# Patient Record
Sex: FEMALE | Race: WHITE | NOT HISPANIC OR LATINO | Employment: OTHER | URBAN - NONMETROPOLITAN AREA
[De-identification: names, ages, dates, MRNs, and addresses within clinical notes are randomized per-mention and may not be internally consistent; named-entity substitution may affect disease eponyms.]

---

## 2017-12-19 ENCOUNTER — OFFICE VISIT (OUTPATIENT)
Dept: ENDOCRINOLOGY | Facility: CLINIC | Age: 62
End: 2017-12-19

## 2017-12-19 ENCOUNTER — APPOINTMENT (OUTPATIENT)
Dept: LAB | Facility: HOSPITAL | Age: 62
End: 2017-12-19

## 2017-12-19 VITALS
SYSTOLIC BLOOD PRESSURE: 128 MMHG | HEIGHT: 69 IN | HEART RATE: 63 BPM | BODY MASS INDEX: 31.83 KG/M2 | WEIGHT: 214.9 LBS | DIASTOLIC BLOOD PRESSURE: 80 MMHG

## 2017-12-19 DIAGNOSIS — M80.00XS AGE-RELATED OSTEOPOROSIS WITH CURRENT PATHOLOGICAL FRACTURE, SEQUELA: ICD-10-CM

## 2017-12-19 DIAGNOSIS — E04.1 LEFT THYROID NODULE: ICD-10-CM

## 2017-12-19 DIAGNOSIS — E11.9 CONTROLLED TYPE 2 DIABETES MELLITUS WITHOUT COMPLICATION, WITHOUT LONG-TERM CURRENT USE OF INSULIN (HCC): ICD-10-CM

## 2017-12-19 DIAGNOSIS — E21.0 PRIMARY HYPERPARATHYROIDISM (HCC): Primary | ICD-10-CM

## 2017-12-19 PROBLEM — Q78.2 SEVERE OSTEOPETROSIS: Status: ACTIVE | Noted: 2017-12-19

## 2017-12-19 LAB
25(OH)D3 SERPL-MCNC: 49 NG/ML (ref 30–100)
ALBUMIN SERPL-MCNC: 4.2 G/DL (ref 3.4–4.8)
ALBUMIN/GLOB SERPL: 1.2 G/DL (ref 1.1–1.8)
ALP SERPL-CCNC: 65 U/L (ref 38–126)
ALT SERPL W P-5'-P-CCNC: 49 U/L (ref 9–52)
ANION GAP SERPL CALCULATED.3IONS-SCNC: 14 MMOL/L (ref 5–15)
ARTICHOKE IGE QN: 91 MG/DL (ref 1–129)
AST SERPL-CCNC: 36 U/L (ref 14–36)
BASOPHILS # BLD AUTO: 0.02 10*3/MM3 (ref 0–0.2)
BASOPHILS NFR BLD AUTO: 0.3 % (ref 0–2)
BILIRUB SERPL-MCNC: 0.4 MG/DL (ref 0.2–1.3)
BUN BLD-MCNC: 14 MG/DL (ref 7–21)
BUN/CREAT SERPL: 19.2 (ref 7–25)
CALCIUM SPEC-SCNC: 11.1 MG/DL (ref 8.4–10.2)
CHLORIDE SERPL-SCNC: 104 MMOL/L (ref 95–110)
CHOLEST SERPL-MCNC: 158 MG/DL (ref 0–199)
CO2 SERPL-SCNC: 22 MMOL/L (ref 22–31)
CREAT BLD-MCNC: 0.73 MG/DL (ref 0.5–1)
DEPRECATED RDW RBC AUTO: 43.4 FL (ref 36.4–46.3)
EOSINOPHIL # BLD AUTO: 0.26 10*3/MM3 (ref 0–0.7)
EOSINOPHIL NFR BLD AUTO: 4.5 % (ref 0–7)
ERYTHROCYTE [DISTWIDTH] IN BLOOD BY AUTOMATED COUNT: 12.5 % (ref 11.5–14.5)
GFR SERPL CREATININE-BSD FRML MDRD: 80 ML/MIN/1.73 (ref 45–104)
GLOBULIN UR ELPH-MCNC: 3.6 GM/DL (ref 2.3–3.5)
GLUCOSE BLD-MCNC: 154 MG/DL (ref 60–100)
HBA1C MFR BLD: 6.9 % (ref 4–5.6)
HCT VFR BLD AUTO: 37.7 % (ref 35–45)
HDLC SERPL-MCNC: 36 MG/DL (ref 60–200)
HGB BLD-MCNC: 12.4 G/DL (ref 12–15.5)
IMM GRANULOCYTES # BLD: 0.01 10*3/MM3 (ref 0–0.02)
IMM GRANULOCYTES NFR BLD: 0.2 % (ref 0–0.5)
LDLC/HDLC SERPL: 1.8 {RATIO} (ref 0–3.22)
LYMPHOCYTES # BLD AUTO: 1.65 10*3/MM3 (ref 0.6–4.2)
LYMPHOCYTES NFR BLD AUTO: 28.4 % (ref 10–50)
MCH RBC QN AUTO: 31.2 PG (ref 26.5–34)
MCHC RBC AUTO-ENTMCNC: 32.9 G/DL (ref 31.4–36)
MCV RBC AUTO: 95 FL (ref 80–98)
MONOCYTES # BLD AUTO: 0.31 10*3/MM3 (ref 0–0.9)
MONOCYTES NFR BLD AUTO: 5.3 % (ref 0–12)
NEUTROPHILS # BLD AUTO: 3.56 10*3/MM3 (ref 2–8.6)
NEUTROPHILS NFR BLD AUTO: 61.3 % (ref 37–80)
PHOSPHATE SERPL-MCNC: 3.3 MG/DL (ref 2.4–4.4)
PLATELET # BLD AUTO: 221 10*3/MM3 (ref 150–450)
PMV BLD AUTO: 11.6 FL (ref 8–12)
POTASSIUM BLD-SCNC: 4.1 MMOL/L (ref 3.5–5.1)
PROT SERPL-MCNC: 7.8 G/DL (ref 6.3–8.6)
RBC # BLD AUTO: 3.97 10*6/MM3 (ref 3.77–5.16)
SODIUM BLD-SCNC: 140 MMOL/L (ref 137–145)
TRIGL SERPL-MCNC: 286 MG/DL (ref 20–199)
TSH SERPL DL<=0.05 MIU/L-ACNC: 1.22 MIU/ML (ref 0.46–4.68)
WBC NRBC COR # BLD: 5.81 10*3/MM3 (ref 3.2–9.8)

## 2017-12-19 PROCEDURE — 99205 OFFICE O/P NEW HI 60 MIN: CPT | Performed by: INTERNAL MEDICINE

## 2017-12-19 PROCEDURE — 36415 COLL VENOUS BLD VENIPUNCTURE: CPT | Performed by: INTERNAL MEDICINE

## 2017-12-19 PROCEDURE — 84443 ASSAY THYROID STIM HORMONE: CPT | Performed by: INTERNAL MEDICINE

## 2017-12-19 PROCEDURE — 83970 ASSAY OF PARATHORMONE: CPT | Performed by: INTERNAL MEDICINE

## 2017-12-19 PROCEDURE — 82306 VITAMIN D 25 HYDROXY: CPT | Performed by: INTERNAL MEDICINE

## 2017-12-19 PROCEDURE — 84100 ASSAY OF PHOSPHORUS: CPT | Performed by: INTERNAL MEDICINE

## 2017-12-19 PROCEDURE — 80061 LIPID PANEL: CPT | Performed by: INTERNAL MEDICINE

## 2017-12-19 PROCEDURE — 85025 COMPLETE CBC W/AUTO DIFF WBC: CPT | Performed by: INTERNAL MEDICINE

## 2017-12-19 PROCEDURE — 80053 COMPREHEN METABOLIC PANEL: CPT | Performed by: INTERNAL MEDICINE

## 2017-12-19 PROCEDURE — 83036 HEMOGLOBIN GLYCOSYLATED A1C: CPT | Performed by: INTERNAL MEDICINE

## 2017-12-19 PROCEDURE — 82652 VIT D 1 25-DIHYDROXY: CPT | Performed by: INTERNAL MEDICINE

## 2017-12-19 RX ORDER — MELATONIN
1000 DAILY
COMMUNITY
End: 2018-03-30

## 2017-12-19 RX ORDER — OLOPATADINE HYDROCHLORIDE 2 MG/ML
SOLUTION/ DROPS OPHTHALMIC DAILY PRN
COMMUNITY

## 2017-12-19 RX ORDER — DICYCLOMINE HYDROCHLORIDE 10 MG/1
10 CAPSULE ORAL 3 TIMES DAILY PRN
COMMUNITY

## 2017-12-19 RX ORDER — CARVEDILOL 3.12 MG/1
3.12 TABLET ORAL 2 TIMES DAILY WITH MEALS
COMMUNITY

## 2017-12-19 RX ORDER — CETIRIZINE HYDROCHLORIDE 10 MG/1
10 TABLET ORAL NIGHTLY
COMMUNITY
End: 2020-12-11

## 2017-12-19 RX ORDER — IBANDRONATE SODIUM 150 MG/1
150 TABLET, FILM COATED ORAL
COMMUNITY
End: 2018-03-30

## 2017-12-19 RX ORDER — DOXYCYCLINE HYCLATE 50 MG/1
50 CAPSULE ORAL 2 TIMES DAILY
COMMUNITY
End: 2018-03-30

## 2017-12-19 RX ORDER — LOSARTAN POTASSIUM 50 MG/1
50 TABLET ORAL DAILY
COMMUNITY

## 2017-12-19 RX ORDER — OMEPRAZOLE 20 MG/1
20 CAPSULE, DELAYED RELEASE ORAL NIGHTLY
COMMUNITY

## 2017-12-19 RX ORDER — MONTELUKAST SODIUM 10 MG/1
10 TABLET ORAL NIGHTLY
COMMUNITY

## 2017-12-19 RX ORDER — BUDESONIDE AND FORMOTEROL FUMARATE DIHYDRATE 160; 4.5 UG/1; UG/1
2 AEROSOL RESPIRATORY (INHALATION)
COMMUNITY

## 2017-12-19 RX ORDER — METRONIDAZOLE 7.5 MG/G
GEL VAGINAL 2 TIMES DAILY
COMMUNITY
End: 2018-03-30

## 2017-12-19 RX ORDER — SODIUM PHOSPHATE,MONO-DIBASIC 19G-7G/118
ENEMA (ML) RECTAL 2 TIMES DAILY
COMMUNITY

## 2017-12-19 NOTE — PROGRESS NOTES
Rekha Dorado is a 62 y.o. female who presents for  evaluation of   Chief Complaint   Patient presents with   • Abnormal Lab       Referring provider    Mariam Morgan MD  [address] [place] [address]    Primary Care Provider    Mariam Morgan MD    Very pleasant 62-year-old female is referred for hypercalcemia.    Duration, she is aware of hypercalcemia since 2014    Timing, this has been constant.    Severity moderate to severe given associated complications.    Complications, she has severe osteoporosis complicated by  wrist fracture.    She also has history of nephrolithiasis.    Quantity, calcium has been as high as 11.9 in February 2017.    Lab Results   Component Value Date    PTH 47.3 12/19/2017    CALCIUM 11.1 (H) 12/19/2017    CALCIUM 11.5 (H) 12/19/2017    PHOS 3.3 12/19/2017         Alleviating factors she is taking Boniva for osteoporosis as well as vitamin D 2000 units daily.    ----------    She also has type 2 diabetes presently on metformin monotherapy with elevated fasting glucose    Past Medical History:   Diagnosis Date   • Controlled type 2 diabetes mellitus without complication, without long-term current use of insulin 12/19/2017   • Left thyroid nodule 12/19/2017   • Primary hyperparathyroidism 12/19/2017   • Severe osteopetrosis 12/19/2017     Family History   Problem Relation Age of Onset   • Diabetes Mother      Social History   Substance Use Topics   • Smoking status: Never Smoker   • Smokeless tobacco: Never Used   • Alcohol use None         Current Outpatient Prescriptions:   •  b complex-C-folic acid 1 MG capsule, Take 1 capsule by mouth Daily., Disp: , Rfl:   •  budesonide-formoterol (SYMBICORT) 160-4.5 MCG/ACT inhaler, Inhale 2 puffs 2 (Two) Times a Day., Disp: , Rfl:   •  carvedilol (COREG) 3.125 MG tablet, Take 3.125 mg by mouth 2 (Two) Times a Day With Meals., Disp: , Rfl:   •  cetirizine (zyrTEC) 10 MG tablet, Take 10 mg by mouth Daily., Disp: , Rfl:   •   cholecalciferol (VITAMIN D3) 1000 units tablet, Take 1,000 Units by mouth Daily., Disp: , Rfl:   •  choline fenofibrate (TRILIPIX) 135 MG capsule, Take 135 mg by mouth Daily., Disp: , Rfl:   •  dicyclomine (BENTYL) 10 MG capsule, Take 10 mg by mouth., Disp: , Rfl:   •  doxycycline (VIBRAMYCIN) 50 MG capsule, Take 50 mg by mouth 2 (Two) Times a Day., Disp: , Rfl:   •  glucosamine sulfate 500 MG capsule capsule, Take  by mouth 3 (Three) Times a Day With Meals., Disp: , Rfl:   •  ibandronate (BONIVA) 150 MG tablet, Take 150 mg by mouth Every 30 (Thirty) Days., Disp: , Rfl:   •  losartan (COZAAR) 100 MG tablet, Take 100 mg by mouth Daily., Disp: , Rfl:   •  metroNIDAZOLE (METROGEL) 0.75 % vaginal gel, Insert  into the vagina 2 (Two) Times a Day., Disp: , Rfl:   •  montelukast (SINGULAIR) 10 MG tablet, Take 10 mg by mouth Every Night., Disp: , Rfl:   •  olopatadine (PATADAY) 0.2 % solution ophthalmic solution, Daily., Disp: , Rfl:   •  omeprazole (priLOSEC) 20 MG capsule, Take 20 mg by mouth Daily., Disp: , Rfl:   •  SITagliptin-MetFORMIN HCl -1000 MG tablet sustained-release 24 hour, One tab po daily with bkfast, Disp: 30 tablet, Rfl: 11    Review of Systems    Review of Systems   Constitutional: Negative for activity change, appetite change, chills, diaphoresis, fatigue, fever and unexpected weight change.   HENT: Negative for congestion, dental problem, drooling, ear discharge, ear pain, facial swelling, mouth sores, postnasal drip, rhinorrhea, sinus pressure, sore throat, tinnitus, trouble swallowing and voice change.    Eyes: Negative for photophobia, pain, discharge, redness, itching and visual disturbance.   Respiratory: Negative for apnea, cough, choking, chest tightness, shortness of breath, wheezing and stridor.    Cardiovascular: Negative for chest pain, palpitations and leg swelling.   Gastrointestinal: Negative for abdominal distention, abdominal pain, constipation, diarrhea, nausea and vomiting.  "  Endocrine: Negative for cold intolerance, heat intolerance, polydipsia, polyphagia and polyuria.   Genitourinary: Negative for decreased urine volume, difficulty urinating, dysuria, flank pain, frequency, hematuria and urgency.   Musculoskeletal: Negative for arthralgias, back pain, gait problem, joint swelling, myalgias, neck pain and neck stiffness.   Skin: Negative for color change, pallor, rash and wound.   Allergic/Immunologic: Negative for immunocompromised state.   Neurological: Negative for dizziness, tremors, seizures, syncope, facial asymmetry, speech difficulty, weakness, light-headedness, numbness and headaches.   Hematological: Negative for adenopathy.   Psychiatric/Behavioral: Negative for agitation, behavioral problems, confusion, decreased concentration, dysphoric mood, hallucinations, self-injury, sleep disturbance and suicidal ideas. The patient is not nervous/anxious and is not hyperactive.         Objective:   /80 (BP Location: Left arm, Patient Position: Sitting, Cuff Size: Large Adult)  Pulse 63  Ht 175.3 cm (69\")  Wt 97.5 kg (214 lb 14.4 oz)  BMI 31.74 kg/m2    Physical Exam   Constitutional: She is oriented to person, place, and time. She appears well-developed.   HENT:   Head: Normocephalic.   Right Ear: External ear normal.   Left Ear: External ear normal.   Nose: Nose normal.   Eyes: Conjunctivae and EOM are normal. No scleral icterus.   Neck: Normal range of motion. Neck supple. No tracheal deviation present. Thyromegaly present.   Palpable left thyroid nodule   Cardiovascular: Normal rate, regular rhythm, normal heart sounds and intact distal pulses.  Exam reveals no gallop and no friction rub.    No murmur heard.  Pulmonary/Chest: Effort normal and breath sounds normal. No stridor. No respiratory distress. She has no wheezes. She has no rales. She exhibits no tenderness.   Abdominal: Soft. Bowel sounds are normal. She exhibits no distension and no mass. There is no " tenderness. There is no rebound and no guarding.   Musculoskeletal: Normal range of motion. She exhibits no tenderness or deformity.   Lymphadenopathy:     She has no cervical adenopathy.   Neurological: She is alert and oriented to person, place, and time. She displays normal reflexes. She exhibits normal muscle tone. Coordination normal.   Skin: No rash noted. No erythema. No pallor.   Psychiatric: She has a normal mood and affect. Her behavior is normal. Judgment and thought content normal.       Lab Review    No results found for this or any previous visit.        Assessment/Plan       ICD-10-CM ICD-9-CM   1. Primary hyperparathyroidism E21.0 252.01   2. Controlled type 2 diabetes mellitus without complication, without long-term current use of insulin E11.9 250.00   3. Left thyroid nodule E04.1 241.0   4. Age-related osteoporosis with current pathological fracture, sequela M80.00XS 905.5     733.01     In regards to diabetes she is presently taking metformin 500 mg with breakfast and 1000 mg with supper.    Change to Janumet 100/1000 mg extended release one tablet with breakfast.    ===========    Primary hyperparathyroidism.    Diagnosis has been proven, PTH is high normal, calcium is 11.2, urine calcium exceeds 500 mg.    Parathyroid scan and neck ultrasound did not reveal parathyroid adenoma.    I will still referred her for surgery given that she has many indications: Nephrolithiasis, osteoporosis, calcium level that exceeds 11, urine calcium that exceeds 300.      Continue taking 2000 units of vitamin D and despite having high calcium she should be taking 400-600 mg daily to prevent worsening of osteoporosis and in the event that parathyroidectomy is done to prevent hungry bone syndrome.    Stop Boniva and start Prolia          I reviewed and summarized records from Mariam Morgan MD from 2017 and I reviewed / ordered labs.     Orders Placed This Encounter   Procedures   • NM Parathyroid Scan w SPECT      Order Specific Question:   Reason for Exam:     Answer:   primary hyperparathyroidism   • US Thyroid     Order Specific Question:   Reason for Exam:     Answer:   left thryoid nodule   • DEXA Bone Density Appendicular     Order Specific Question:   Reason for Exam:     Answer:   primary hyperparathyroidism   • DEXA Bone Density Axial     Order Specific Question:   Reason for Exam:     Answer:   osteoporosis   • CBC Auto Differential   • Comprehensive Metabolic Panel   • Hemoglobin A1c   • TSH   • Lipid Panel   • Calcium, Urine, 24 Hour - Urine, Clean Catch   • Creatinine, Urine, 24 Hour - Urine, Clean Catch   • Vitamin D 25 Hydroxy   • Vitamin D 1,25 Dihydroxy   • Phosphorus   • PTH, Intact & Calcium         A copy of my note was sent to Mariam Morgan MD    Please see my above opinion and suggestions.

## 2017-12-20 LAB
CALCIUM SPEC-SCNC: 11.5 MG/DL (ref 8.4–10.2)
PTH-INTACT SERPL-MCNC: 47.3 PG/ML (ref 10–65)

## 2017-12-21 LAB — 1,25(OH)2D3 SERPL-MCNC: 77.5 PG/ML (ref 19.9–79.3)

## 2017-12-21 PROCEDURE — 82340 ASSAY OF CALCIUM IN URINE: CPT | Performed by: INTERNAL MEDICINE

## 2017-12-21 PROCEDURE — 82570 ASSAY OF URINE CREATININE: CPT | Performed by: INTERNAL MEDICINE

## 2017-12-21 PROCEDURE — 81050 URINALYSIS VOLUME MEASURE: CPT | Performed by: INTERNAL MEDICINE

## 2017-12-22 LAB
CALCIUM 24H UR-MCNC: 21.4 MG/DL
CALCIUM 24H UR-MRATE: 535 MG/24 HR (ref 100–300)
COLLECT DURATION TIME UR: 24 HRS
COLLECT DURATION TIME UR: 24 HRS
CREAT UR-MCNC: 54.9 MG/DL
CREATINE 24H UR-MRATE: 1.37 G/24 HR (ref 0.8–2.8)
SPECIMEN VOL 24H UR: 2500 ML
SPECIMEN VOL 24H UR: 2500 ML

## 2018-01-02 ENCOUNTER — HOSPITAL ENCOUNTER (OUTPATIENT)
Dept: ULTRASOUND IMAGING | Facility: HOSPITAL | Age: 63
Discharge: HOME | End: 2018-01-02
Admitting: INTERNAL MEDICINE

## 2018-01-02 ENCOUNTER — HOSPITAL ENCOUNTER (OUTPATIENT)
Dept: NUCLEAR MEDICINE | Facility: HOSPITAL | Age: 63
Discharge: HOME | End: 2018-01-02

## 2018-01-02 PROCEDURE — 76536 US EXAM OF HEAD AND NECK: CPT

## 2018-01-02 PROCEDURE — 78071 PARATHYRD PLANAR W/WO SUBTRJ: CPT

## 2018-01-02 PROCEDURE — A9500 TC99M SESTAMIBI: HCPCS | Performed by: INTERNAL MEDICINE

## 2018-01-02 PROCEDURE — 0 TECHNETIUM SESTAMIBI: Performed by: INTERNAL MEDICINE

## 2018-01-02 RX ADMIN — TECHNETIUM TC-99M SESTAMIBI 1 DOSE: 1 INJECTION INTRAVENOUS at 13:11

## 2018-01-08 DIAGNOSIS — E21.0 PRIMARY HYPERPARATHYROIDISM (HCC): Primary | ICD-10-CM

## 2018-01-23 LAB
ALBUMIN SERPL-MCNC: 4.2 G/DL (ref 3.4–4.8)
ANION GAP SERPL CALCULATED.3IONS-SCNC: 11 MMOL/L (ref 5–15)
BUN BLD-MCNC: 18 MG/DL (ref 7–21)
BUN/CREAT SERPL: 21.2 (ref 7–25)
CALCIUM SPEC-SCNC: 12.8 MG/DL (ref 8.4–10.2)
CHLORIDE SERPL-SCNC: 101 MMOL/L (ref 95–110)
CO2 SERPL-SCNC: 27 MMOL/L (ref 22–31)
CREAT BLD-MCNC: 0.85 MG/DL (ref 0.5–1)
GFR SERPL CREATININE-BSD FRML MDRD: 67 ML/MIN/1.73 (ref 45–104)
GLUCOSE BLD-MCNC: 98 MG/DL (ref 60–100)
PHOSPHATE SERPL-MCNC: 2.5 MG/DL (ref 2.4–4.4)
POTASSIUM BLD-SCNC: 3.6 MMOL/L (ref 3.5–5.1)
SODIUM BLD-SCNC: 139 MMOL/L (ref 137–145)

## 2018-01-23 PROCEDURE — 36415 COLL VENOUS BLD VENIPUNCTURE: CPT | Performed by: FAMILY MEDICINE

## 2018-01-23 PROCEDURE — 82652 VIT D 1 25-DIHYDROXY: CPT | Performed by: INTERNAL MEDICINE

## 2018-01-23 PROCEDURE — 80069 RENAL FUNCTION PANEL: CPT | Performed by: INTERNAL MEDICINE

## 2018-01-23 PROCEDURE — 82310 ASSAY OF CALCIUM: CPT | Performed by: INTERNAL MEDICINE

## 2018-01-23 PROCEDURE — 83970 ASSAY OF PARATHORMONE: CPT | Performed by: INTERNAL MEDICINE

## 2018-01-24 LAB
CALCIUM SPEC-SCNC: 12.9 MG/DL (ref 8.4–10.2)
PTH-INTACT SERPL-MCNC: 74.8 PG/ML (ref 10–65)

## 2018-01-25 LAB — 1,25(OH)2D3 SERPL-MCNC: 86.5 PG/ML (ref 19.9–79.3)

## 2018-01-28 DIAGNOSIS — E21.0 PRIMARY HYPERPARATHYROIDISM (HCC): Primary | ICD-10-CM

## 2018-02-14 ENCOUNTER — CONSULT (OUTPATIENT)
Dept: SURGERY | Facility: CLINIC | Age: 63
End: 2018-02-14

## 2018-02-14 VITALS
WEIGHT: 206.8 LBS | HEIGHT: 69 IN | SYSTOLIC BLOOD PRESSURE: 126 MMHG | BODY MASS INDEX: 30.63 KG/M2 | DIASTOLIC BLOOD PRESSURE: 70 MMHG

## 2018-02-14 DIAGNOSIS — E21.0 PRIMARY HYPERPARATHYROIDISM (HCC): Primary | ICD-10-CM

## 2018-02-14 PROCEDURE — 99203 OFFICE O/P NEW LOW 30 MIN: CPT | Performed by: SURGERY

## 2018-02-14 NOTE — PROGRESS NOTES
Chief Complaint   Patient presents with   • Difficulty Swallowing   • Parathyroid        HPI  Primary hyperparathyroidism.     Diagnosis has been proven, PTH is high normal, calcium is 11.2, urine calcium exceeds 500 mg.     Parathyroid scan and neck ultrasound did not reveal parathyroid adenoma.     Referred her for surgery given that she has many indications: Nephrolithiasis, osteoporosis, calcium level that exceeds 11, urine calcium that exceeds 300.        She continues taking 2000 units of vitamin D and despite having high calcium she should be taking 400-600 mg daily to prevent worsening of osteoporosis and in the event that parathyroidectomy is done to prevent hungry bone syndrome.     She stopped Boniva and started Prolia     3wk ago  (1/23/18) 3wk ago  (1/23/18) 1mo ago  (12/19/17) 1mo ago  (12/19/17)       PTH, Intact 10.0 - 65.0 pg/mL 74.8 (H)  47.3     Calcium 8.4 - 10.2 mg/dL 12.9 (H) 12.8 (H) 11.5 (H) 11.1 (H)   [facility]      Specimen Collected: 01/23/18  8:58 AM Last Resulted: 01/24/18  7:49 AM          Past Medical History:   Diagnosis Date   • Controlled type 2 diabetes mellitus without complication, without long-term current use of insulin 12/19/2017   • Left thyroid nodule 12/19/2017   • Primary hyperparathyroidism 12/19/2017   • Severe osteopetrosis 12/19/2017       No past surgical history on file.      Current Outpatient Prescriptions:   •  budesonide-formoterol (SYMBICORT) 160-4.5 MCG/ACT inhaler, Inhale 2 puffs 2 (Two) Times a Day., Disp: , Rfl:   •  carvedilol (COREG) 3.125 MG tablet, Take 3.125 mg by mouth 2 (Two) Times a Day With Meals., Disp: , Rfl:   •  cetirizine (zyrTEC) 10 MG tablet, Take 10 mg by mouth Daily., Disp: , Rfl:   •  cholecalciferol (VITAMIN D3) 1000 units tablet, Take 1,000 Units by mouth Daily., Disp: , Rfl:   •  choline fenofibrate (TRILIPIX) 135 MG capsule, Take 135 mg by mouth Daily., Disp: , Rfl:   •  CRANBERRY  EXTRACT PO, Take  by mouth Daily., Disp: , Rfl:   •  doxycycline (VIBRAMYCIN) 50 MG capsule, Take 50 mg by mouth 2 (Two) Times a Day., Disp: , Rfl:   •  glucosamine sulfate 500 MG capsule capsule, Take  by mouth 3 (Three) Times a Day With Meals., Disp: , Rfl:   •  ibandronate (BONIVA) 150 MG tablet, Take 150 mg by mouth Every 30 (Thirty) Days., Disp: , Rfl:   •  KRILL OIL PO, Take  by mouth Daily., Disp: , Rfl:   •  losartan (COZAAR) 100 MG tablet, Take 100 mg by mouth Daily., Disp: , Rfl:   •  metroNIDAZOLE (METROGEL) 0.75 % vaginal gel, Insert  into the vagina 2 (Two) Times a Day., Disp: , Rfl:   •  montelukast (SINGULAIR) 10 MG tablet, Take 10 mg by mouth Every Night., Disp: , Rfl:   •  omeprazole (priLOSEC) 20 MG capsule, Take 20 mg by mouth Daily., Disp: , Rfl:   •  SITagliptin 100 MG tablet 1 tablet, metFORMIN  MG tablet sustained-release 24 hour 2 tablet, Take 1 dose by mouth Daily With Breakfast., Disp: , Rfl:   •  Sulfacetamide Sodium-Sulfur (PLEXION CLEANSER EX), Apply  topically., Disp: , Rfl:   •  b complex-C-folic acid 1 MG capsule, Take 1 capsule by mouth Daily., Disp: , Rfl:   •  dicyclomine (BENTYL) 10 MG capsule, Take 10 mg by mouth., Disp: , Rfl:   •  olopatadine (PATADAY) 0.2 % solution ophthalmic solution, Daily., Disp: , Rfl:   •  SITagliptin-MetFORMIN HCl -1000 MG tablet sustained-release 24 hour, One tab po daily with bkfast, Disp: 30 tablet, Rfl: 11    No Known Allergies    Family History   Problem Relation Age of Onset   • Diabetes Mother        Social History     Social History   • Marital status:      Spouse name: N/A   • Number of children: N/A   • Years of education: N/A     Occupational History   • Not on file.     Social History Main Topics   • Smoking status: Never Smoker   • Smokeless tobacco: Never Used   • Alcohol use No   • Drug use: Not on file   • Sexual activity: Not on file     Other Topics Concern   • Not on file     Social History Narrative       Review of  Systems   Constitutional: Negative for appetite change, chills, fever and unexpected weight change.   HENT: Negative for hearing loss, nosebleeds and trouble swallowing.    Eyes: Negative for visual disturbance.   Respiratory: Negative for apnea, cough, choking, chest tightness, shortness of breath, wheezing and stridor.    Cardiovascular: Negative for chest pain, palpitations and leg swelling.   Gastrointestinal: Negative for abdominal distention, abdominal pain, blood in stool, constipation, diarrhea, nausea and vomiting.   Endocrine: Negative for cold intolerance, heat intolerance, polydipsia, polyphagia and polyuria.   Genitourinary: Negative for difficulty urinating, dysuria, frequency, hematuria and urgency.   Musculoskeletal: Negative for arthralgias, back pain, myalgias and neck pain.   Skin: Negative for color change, pallor and rash.   Allergic/Immunologic: Negative for immunocompromised state.   Neurological: Negative for dizziness, seizures, syncope, light-headedness, numbness and headaches.   Hematological: Negative for adenopathy.   Psychiatric/Behavioral: Negative for suicidal ideas. The patient is not nervous/anxious.        Physical Exam   Constitutional: She is oriented to person, place, and time. She appears well-developed and well-nourished.   HENT:   Head: Normocephalic and atraumatic.   Eyes: EOM are normal. Pupils are equal, round, and reactive to light.   Neck: Normal range of motion. Neck supple. No JVD present. No tracheal deviation present. No thyromegaly present.   Cardiovascular: Normal rate.    Pulmonary/Chest: Effort normal and breath sounds normal. No stridor. She has no wheezes. She has no rales. She exhibits no tenderness.   Musculoskeletal: Normal range of motion. She exhibits no edema, tenderness or deformity.   Lymphadenopathy:     She has no cervical adenopathy.   Neurological: She is alert and oriented to person, place, and time.   Skin: Skin is warm and dry. No rash noted. No  erythema. No pallor.   Psychiatric: She has a normal mood and affect. Her behavior is normal. Judgment and thought content normal.   Vitals reviewed.        ASSESSMENT    Rekha was seen today for difficulty swallowing and parathyroid.    Diagnoses and all orders for this visit:    Primary hyperparathyroidism      PLAN    1. Recheck in 1 month- desires surgery in April          This document has been electronically signed by Stanley Rodriguez MD on February 18, 2018 11:50 PM

## 2018-03-30 ENCOUNTER — OFFICE VISIT (OUTPATIENT)
Dept: SURGERY | Facility: CLINIC | Age: 63
End: 2018-03-30

## 2018-03-30 ENCOUNTER — ANESTHESIA EVENT (OUTPATIENT)
Dept: PERIOP | Facility: HOSPITAL | Age: 63
End: 2018-03-30

## 2018-03-30 ENCOUNTER — APPOINTMENT (OUTPATIENT)
Dept: PREADMISSION TESTING | Facility: HOSPITAL | Age: 63
End: 2018-03-30

## 2018-03-30 VITALS
DIASTOLIC BLOOD PRESSURE: 70 MMHG | BODY MASS INDEX: 31.37 KG/M2 | SYSTOLIC BLOOD PRESSURE: 140 MMHG | HEIGHT: 69 IN | WEIGHT: 211.8 LBS

## 2018-03-30 VITALS
BODY MASS INDEX: 30.96 KG/M2 | DIASTOLIC BLOOD PRESSURE: 68 MMHG | HEART RATE: 68 BPM | HEIGHT: 69 IN | RESPIRATION RATE: 18 BRPM | SYSTOLIC BLOOD PRESSURE: 110 MMHG | OXYGEN SATURATION: 97 % | WEIGHT: 209 LBS

## 2018-03-30 DIAGNOSIS — E21.3 HYPERPARATHYROIDISM (HCC): Primary | ICD-10-CM

## 2018-03-30 LAB
ANION GAP SERPL CALCULATED.3IONS-SCNC: 15 MMOL/L (ref 5–15)
BUN BLD-MCNC: 19 MG/DL (ref 7–21)
BUN/CREAT SERPL: 25.3 (ref 7–25)
CALCIUM SPEC-SCNC: 11.5 MG/DL (ref 8.4–10.2)
CHLORIDE SERPL-SCNC: 103 MMOL/L (ref 95–110)
CO2 SERPL-SCNC: 24 MMOL/L (ref 22–31)
CREAT BLD-MCNC: 0.75 MG/DL (ref 0.5–1)
GFR SERPL CREATININE-BSD FRML MDRD: 78 ML/MIN/1.73 (ref 45–104)
GLUCOSE BLD-MCNC: 135 MG/DL (ref 60–100)
POTASSIUM BLD-SCNC: 4.2 MMOL/L (ref 3.5–5.1)
SODIUM BLD-SCNC: 142 MMOL/L (ref 137–145)

## 2018-03-30 PROCEDURE — 93005 ELECTROCARDIOGRAM TRACING: CPT

## 2018-03-30 PROCEDURE — 36415 COLL VENOUS BLD VENIPUNCTURE: CPT

## 2018-03-30 PROCEDURE — 99213 OFFICE O/P EST LOW 20 MIN: CPT | Performed by: SURGERY

## 2018-03-30 PROCEDURE — 93010 ELECTROCARDIOGRAM REPORT: CPT | Performed by: INTERNAL MEDICINE

## 2018-03-30 PROCEDURE — 80048 BASIC METABOLIC PNL TOTAL CA: CPT | Performed by: ANESTHESIOLOGY

## 2018-03-30 RX ORDER — SODIUM CHLORIDE 9 MG/ML
1000 INJECTION, SOLUTION INTRAVENOUS CONTINUOUS PRN
Status: CANCELLED | OUTPATIENT
Start: 2018-04-10

## 2018-03-30 RX ORDER — CHOLECALCIFEROL (VITAMIN D3) 50 MCG
2000 TABLET ORAL DAILY
COMMUNITY

## 2018-03-30 RX ORDER — ASPIRIN 81 MG/1
81 TABLET ORAL NIGHTLY
COMMUNITY

## 2018-04-03 ENCOUNTER — TREATMENT (OUTPATIENT)
Dept: ENDOCRINOLOGY | Facility: CLINIC | Age: 63
End: 2018-04-03

## 2018-04-03 DIAGNOSIS — M81.6 LOCALIZED OSTEOPOROSIS WITHOUT CURRENT PATHOLOGICAL FRACTURE: Primary | ICD-10-CM

## 2018-04-04 DIAGNOSIS — E21.0 PRIMARY HYPERPARATHYROIDISM (HCC): Primary | ICD-10-CM

## 2018-04-10 ENCOUNTER — HOSPITAL ENCOUNTER (OUTPATIENT)
Facility: HOSPITAL | Age: 63
Discharge: HOME | End: 2018-04-11
Attending: SURGERY | Admitting: SURGERY

## 2018-04-10 ENCOUNTER — ANESTHESIA (OUTPATIENT)
Dept: PERIOP | Facility: HOSPITAL | Age: 63
End: 2018-04-10

## 2018-04-10 DIAGNOSIS — E21.3 HYPERPARATHYROIDISM (HCC): ICD-10-CM

## 2018-04-10 LAB
GLUCOSE BLDC GLUCOMTR-MCNC: 123 MG/DL (ref 70–130)
GLUCOSE BLDC GLUCOMTR-MCNC: 131 MG/DL (ref 70–130)
GLUCOSE BLDC GLUCOMTR-MCNC: 185 MG/DL (ref 70–130)
PTH-INTACT SERPL-SCNC: 67.3 PG/ML (ref 10–65)
PTH-INTACT SERPL-SCNC: 9.4 PG/ML (ref 10–65)

## 2018-04-10 PROCEDURE — 25810000003 POTASSIUM CHLORIDE PER 2 MEQ: Performed by: SURGERY

## 2018-04-10 PROCEDURE — 25010000002 SUCCINYLCHOLINE PER 20 MG: Performed by: NURSE ANESTHETIST, CERTIFIED REGISTERED

## 2018-04-10 PROCEDURE — 25010000002 HYDROMORPHONE PER 4 MG: Performed by: SURGERY

## 2018-04-10 PROCEDURE — 88331 PATH CONSLTJ SURG 1 BLK 1SPC: CPT | Performed by: PATHOLOGY

## 2018-04-10 PROCEDURE — 25010000002 PROPOFOL 10 MG/ML EMULSION: Performed by: NURSE ANESTHETIST, CERTIFIED REGISTERED

## 2018-04-10 PROCEDURE — 25010000002 ONDANSETRON PER 1 MG: Performed by: NURSE ANESTHETIST, CERTIFIED REGISTERED

## 2018-04-10 PROCEDURE — 88305 TISSUE EXAM BY PATHOLOGIST: CPT | Performed by: PATHOLOGY

## 2018-04-10 PROCEDURE — 25010000002 FENTANYL CITRATE (PF) 100 MCG/2ML SOLUTION: Performed by: NURSE ANESTHETIST, CERTIFIED REGISTERED

## 2018-04-10 PROCEDURE — 82962 GLUCOSE BLOOD TEST: CPT

## 2018-04-10 PROCEDURE — 25010000003 CEFAZOLIN PER 500 MG: Performed by: NURSE ANESTHETIST, CERTIFIED REGISTERED

## 2018-04-10 PROCEDURE — 25010000002 NEOSTIGMINE PER 0.5 MG: Performed by: NURSE ANESTHETIST, CERTIFIED REGISTERED

## 2018-04-10 PROCEDURE — 25010000002 DEXAMETHASONE PER 1 MG: Performed by: NURSE ANESTHETIST, CERTIFIED REGISTERED

## 2018-04-10 PROCEDURE — 25010000002 MIDAZOLAM PER 1 MG: Performed by: NURSE ANESTHETIST, CERTIFIED REGISTERED

## 2018-04-10 PROCEDURE — 25010000002 HYDRALAZINE PER 20 MG: Performed by: NURSE ANESTHETIST, CERTIFIED REGISTERED

## 2018-04-10 PROCEDURE — 88305 TISSUE EXAM BY PATHOLOGIST: CPT | Performed by: SURGERY

## 2018-04-10 PROCEDURE — 83970 ASSAY OF PARATHORMONE: CPT | Performed by: SURGERY

## 2018-04-10 PROCEDURE — 60500 EXPLORE PARATHYROID GLANDS: CPT | Performed by: SURGERY

## 2018-04-10 PROCEDURE — 88331 PATH CONSLTJ SURG 1 BLK 1SPC: CPT | Performed by: SURGERY

## 2018-04-10 PROCEDURE — 25010000002 HYDROMORPHONE PER 4 MG: Performed by: NURSE ANESTHETIST, CERTIFIED REGISTERED

## 2018-04-10 RX ORDER — LOSARTAN POTASSIUM 50 MG/1
100 TABLET ORAL DAILY
Status: DISCONTINUED | OUTPATIENT
Start: 2018-04-10 | End: 2018-04-11 | Stop reason: HOSPADM

## 2018-04-10 RX ORDER — PANTOPRAZOLE SODIUM 40 MG/1
40 TABLET, DELAYED RELEASE ORAL EVERY MORNING
Status: DISCONTINUED | OUTPATIENT
Start: 2018-04-11 | End: 2018-04-11 | Stop reason: HOSPADM

## 2018-04-10 RX ORDER — PROPOFOL 10 MG/ML
VIAL (ML) INTRAVENOUS AS NEEDED
Status: DISCONTINUED | OUTPATIENT
Start: 2018-04-10 | End: 2018-04-10 | Stop reason: SURG

## 2018-04-10 RX ORDER — ONDANSETRON 4 MG/1
4 TABLET, FILM COATED ORAL EVERY 6 HOURS PRN
Status: DISCONTINUED | OUTPATIENT
Start: 2018-04-10 | End: 2018-04-11 | Stop reason: HOSPADM

## 2018-04-10 RX ORDER — CARVEDILOL 3.12 MG/1
3.12 TABLET ORAL 2 TIMES DAILY WITH MEALS
Status: DISCONTINUED | OUTPATIENT
Start: 2018-04-10 | End: 2018-04-11 | Stop reason: HOSPADM

## 2018-04-10 RX ORDER — EPHEDRINE SULFATE 50 MG/ML
5 INJECTION, SOLUTION INTRAVENOUS ONCE AS NEEDED
Status: DISCONTINUED | OUTPATIENT
Start: 2018-04-10 | End: 2018-04-10 | Stop reason: HOSPADM

## 2018-04-10 RX ORDER — NALOXONE HCL 0.4 MG/ML
0.2 VIAL (ML) INJECTION AS NEEDED
Status: DISCONTINUED | OUTPATIENT
Start: 2018-04-10 | End: 2018-04-10 | Stop reason: HOSPADM

## 2018-04-10 RX ORDER — ROCURONIUM BROMIDE 10 MG/ML
INJECTION, SOLUTION INTRAVENOUS AS NEEDED
Status: DISCONTINUED | OUTPATIENT
Start: 2018-04-10 | End: 2018-04-10 | Stop reason: SURG

## 2018-04-10 RX ORDER — MONTELUKAST SODIUM 10 MG/1
10 TABLET ORAL NIGHTLY
Status: DISCONTINUED | OUTPATIENT
Start: 2018-04-10 | End: 2018-04-11 | Stop reason: HOSPADM

## 2018-04-10 RX ORDER — SODIUM CHLORIDE AND POTASSIUM CHLORIDE 150; 450 MG/100ML; MG/100ML
75 INJECTION, SOLUTION INTRAVENOUS CONTINUOUS
Status: DISCONTINUED | OUTPATIENT
Start: 2018-04-10 | End: 2018-04-11 | Stop reason: HOSPADM

## 2018-04-10 RX ORDER — LIDOCAINE HYDROCHLORIDE 20 MG/ML
INJECTION, SOLUTION INFILTRATION; PERINEURAL AS NEEDED
Status: DISCONTINUED | OUTPATIENT
Start: 2018-04-10 | End: 2018-04-10 | Stop reason: SURG

## 2018-04-10 RX ORDER — FENTANYL CITRATE 50 UG/ML
INJECTION, SOLUTION INTRAMUSCULAR; INTRAVENOUS AS NEEDED
Status: DISCONTINUED | OUTPATIENT
Start: 2018-04-10 | End: 2018-04-10 | Stop reason: SURG

## 2018-04-10 RX ORDER — FLUMAZENIL 0.1 MG/ML
0.2 INJECTION INTRAVENOUS AS NEEDED
Status: DISCONTINUED | OUTPATIENT
Start: 2018-04-10 | End: 2018-04-10 | Stop reason: HOSPADM

## 2018-04-10 RX ORDER — CEFAZOLIN SODIUM 1 G/3ML
INJECTION, POWDER, FOR SOLUTION INTRAMUSCULAR; INTRAVENOUS AS NEEDED
Status: DISCONTINUED | OUTPATIENT
Start: 2018-04-10 | End: 2018-04-10 | Stop reason: SURG

## 2018-04-10 RX ORDER — HYDROCODONE BITARTRATE AND ACETAMINOPHEN 7.5; 325 MG/1; MG/1
1 TABLET ORAL EVERY 4 HOURS PRN
Status: DISCONTINUED | OUTPATIENT
Start: 2018-04-10 | End: 2018-04-11 | Stop reason: HOSPADM

## 2018-04-10 RX ORDER — MIDAZOLAM HYDROCHLORIDE 1 MG/ML
INJECTION INTRAMUSCULAR; INTRAVENOUS AS NEEDED
Status: DISCONTINUED | OUTPATIENT
Start: 2018-04-10 | End: 2018-04-10 | Stop reason: SURG

## 2018-04-10 RX ORDER — ONDANSETRON 2 MG/ML
4 INJECTION INTRAMUSCULAR; INTRAVENOUS EVERY 6 HOURS PRN
Status: DISCONTINUED | OUTPATIENT
Start: 2018-04-10 | End: 2018-04-11 | Stop reason: HOSPADM

## 2018-04-10 RX ORDER — ACETAMINOPHEN 650 MG/1
650 SUPPOSITORY RECTAL ONCE AS NEEDED
Status: DISCONTINUED | OUTPATIENT
Start: 2018-04-10 | End: 2018-04-10 | Stop reason: HOSPADM

## 2018-04-10 RX ORDER — LABETALOL HYDROCHLORIDE 5 MG/ML
INJECTION, SOLUTION INTRAVENOUS AS NEEDED
Status: DISCONTINUED | OUTPATIENT
Start: 2018-04-10 | End: 2018-04-10 | Stop reason: SURG

## 2018-04-10 RX ORDER — VECURONIUM BROMIDE 20 MG/20ML
INJECTION, POWDER, LYOPHILIZED, FOR SOLUTION INTRAVENOUS AS NEEDED
Status: DISCONTINUED | OUTPATIENT
Start: 2018-04-10 | End: 2018-04-10 | Stop reason: SURG

## 2018-04-10 RX ORDER — CETIRIZINE HYDROCHLORIDE 10 MG/1
10 TABLET ORAL NIGHTLY
Status: DISCONTINUED | OUTPATIENT
Start: 2018-04-10 | End: 2018-04-11 | Stop reason: HOSPADM

## 2018-04-10 RX ORDER — SUCCINYLCHOLINE CHLORIDE 20 MG/ML
INJECTION INTRAMUSCULAR; INTRAVENOUS AS NEEDED
Status: DISCONTINUED | OUTPATIENT
Start: 2018-04-10 | End: 2018-04-10 | Stop reason: SURG

## 2018-04-10 RX ORDER — DIPHENHYDRAMINE HYDROCHLORIDE 50 MG/ML
12.5 INJECTION INTRAMUSCULAR; INTRAVENOUS
Status: DISCONTINUED | OUTPATIENT
Start: 2018-04-10 | End: 2018-04-10 | Stop reason: HOSPADM

## 2018-04-10 RX ORDER — HYDRALAZINE HYDROCHLORIDE 20 MG/ML
INJECTION INTRAMUSCULAR; INTRAVENOUS AS NEEDED
Status: DISCONTINUED | OUTPATIENT
Start: 2018-04-10 | End: 2018-04-10 | Stop reason: SURG

## 2018-04-10 RX ORDER — SODIUM CHLORIDE 9 MG/ML
1000 INJECTION, SOLUTION INTRAVENOUS CONTINUOUS PRN
Status: DISCONTINUED | OUTPATIENT
Start: 2018-04-10 | End: 2018-04-10 | Stop reason: HOSPADM

## 2018-04-10 RX ORDER — SODIUM CHLORIDE, SODIUM GLUCONATE, SODIUM ACETATE, POTASSIUM CHLORIDE, AND MAGNESIUM CHLORIDE 526; 502; 368; 37; 30 MG/100ML; MG/100ML; MG/100ML; MG/100ML; MG/100ML
INJECTION, SOLUTION INTRAVENOUS CONTINUOUS PRN
Status: DISCONTINUED | OUTPATIENT
Start: 2018-04-10 | End: 2018-04-10 | Stop reason: SURG

## 2018-04-10 RX ORDER — DEXAMETHASONE SODIUM PHOSPHATE 4 MG/ML
INJECTION, SOLUTION INTRA-ARTICULAR; INTRALESIONAL; INTRAMUSCULAR; INTRAVENOUS; SOFT TISSUE AS NEEDED
Status: DISCONTINUED | OUTPATIENT
Start: 2018-04-10 | End: 2018-04-10 | Stop reason: SURG

## 2018-04-10 RX ORDER — ACETAMINOPHEN 325 MG/1
650 TABLET ORAL ONCE AS NEEDED
Status: DISCONTINUED | OUTPATIENT
Start: 2018-04-10 | End: 2018-04-10 | Stop reason: HOSPADM

## 2018-04-10 RX ORDER — MEPERIDINE HYDROCHLORIDE 50 MG/ML
12.5 INJECTION INTRAMUSCULAR; INTRAVENOUS; SUBCUTANEOUS
Status: DISCONTINUED | OUTPATIENT
Start: 2018-04-10 | End: 2018-04-10 | Stop reason: HOSPADM

## 2018-04-10 RX ORDER — NALOXONE HCL 0.4 MG/ML
0.1 VIAL (ML) INJECTION
Status: DISCONTINUED | OUTPATIENT
Start: 2018-04-10 | End: 2018-04-11 | Stop reason: HOSPADM

## 2018-04-10 RX ORDER — ONDANSETRON 2 MG/ML
4 INJECTION INTRAMUSCULAR; INTRAVENOUS ONCE AS NEEDED
Status: DISCONTINUED | OUTPATIENT
Start: 2018-04-10 | End: 2018-04-10 | Stop reason: HOSPADM

## 2018-04-10 RX ORDER — ONDANSETRON 4 MG/1
4 TABLET, ORALLY DISINTEGRATING ORAL EVERY 6 HOURS PRN
Status: DISCONTINUED | OUTPATIENT
Start: 2018-04-10 | End: 2018-04-11 | Stop reason: HOSPADM

## 2018-04-10 RX ORDER — ONDANSETRON 2 MG/ML
INJECTION INTRAMUSCULAR; INTRAVENOUS AS NEEDED
Status: DISCONTINUED | OUTPATIENT
Start: 2018-04-10 | End: 2018-04-10 | Stop reason: SURG

## 2018-04-10 RX ORDER — LABETALOL HYDROCHLORIDE 5 MG/ML
5 INJECTION, SOLUTION INTRAVENOUS
Status: DISCONTINUED | OUTPATIENT
Start: 2018-04-10 | End: 2018-04-10 | Stop reason: HOSPADM

## 2018-04-10 RX ORDER — GLYCOPYRROLATE 0.2 MG/ML
INJECTION INTRAMUSCULAR; INTRAVENOUS AS NEEDED
Status: DISCONTINUED | OUTPATIENT
Start: 2018-04-10 | End: 2018-04-10 | Stop reason: SURG

## 2018-04-10 RX ADMIN — GLYCOPYRROLATE 0.6 MG: 0.2 INJECTION, SOLUTION INTRAMUSCULAR; INTRAVENOUS at 15:19

## 2018-04-10 RX ADMIN — ROCURONIUM BROMIDE 45 MG: 10 INJECTION INTRAVENOUS at 13:00

## 2018-04-10 RX ADMIN — SODIUM CHLORIDE: 900 INJECTION, SOLUTION INTRAVENOUS at 12:20

## 2018-04-10 RX ADMIN — VECURONIUM BROMIDE 4 MG: 1 INJECTION, POWDER, LYOPHILIZED, FOR SOLUTION INTRAVENOUS at 13:22

## 2018-04-10 RX ADMIN — HYDROCODONE BITARTRATE AND ACETAMINOPHEN 1 TABLET: 7.5; 325 TABLET ORAL at 18:10

## 2018-04-10 RX ADMIN — FENTANYL CITRATE 50 MCG: 50 INJECTION, SOLUTION INTRAMUSCULAR; INTRAVENOUS at 13:05

## 2018-04-10 RX ADMIN — MIDAZOLAM 2 MG: 1 INJECTION INTRAMUSCULAR; INTRAVENOUS at 12:27

## 2018-04-10 RX ADMIN — CARVEDILOL 3.12 MG: 3.12 TABLET, FILM COATED ORAL at 18:10

## 2018-04-10 RX ADMIN — VECURONIUM BROMIDE 2 MG: 1 INJECTION, POWDER, LYOPHILIZED, FOR SOLUTION INTRAVENOUS at 14:41

## 2018-04-10 RX ADMIN — SODIUM CHLORIDE 1000 ML: 900 INJECTION, SOLUTION INTRAVENOUS at 09:43

## 2018-04-10 RX ADMIN — FENTANYL CITRATE 100 MCG: 50 INJECTION, SOLUTION INTRAMUSCULAR; INTRAVENOUS at 14:04

## 2018-04-10 RX ADMIN — MONTELUKAST SODIUM 10 MG: 10 TABLET, FILM COATED ORAL at 21:19

## 2018-04-10 RX ADMIN — SODIUM CHLORIDE, SODIUM GLUCONATE, SODIUM ACETATE, POTASSIUM CHLORIDE, AND MAGNESIUM CHLORIDE: 526; 502; 368; 37; 30 INJECTION, SOLUTION INTRAVENOUS at 13:08

## 2018-04-10 RX ADMIN — HYDROMORPHONE HYDROCHLORIDE 1 MG: 1 INJECTION, SOLUTION INTRAMUSCULAR; INTRAVENOUS; SUBCUTANEOUS at 19:00

## 2018-04-10 RX ADMIN — LABETALOL HYDROCHLORIDE 5 MG: 5 INJECTION, SOLUTION INTRAVENOUS at 14:38

## 2018-04-10 RX ADMIN — HYDROMORPHONE HYDROCHLORIDE 1 MG: 1 INJECTION, SOLUTION INTRAMUSCULAR; INTRAVENOUS; SUBCUTANEOUS at 22:47

## 2018-04-10 RX ADMIN — FENTANYL CITRATE 100 MCG: 50 INJECTION, SOLUTION INTRAMUSCULAR; INTRAVENOUS at 13:00

## 2018-04-10 RX ADMIN — FENTANYL CITRATE 50 MCG: 50 INJECTION, SOLUTION INTRAMUSCULAR; INTRAVENOUS at 13:40

## 2018-04-10 RX ADMIN — FENTANYL CITRATE 50 MCG: 50 INJECTION, SOLUTION INTRAMUSCULAR; INTRAVENOUS at 13:48

## 2018-04-10 RX ADMIN — Medication 3 MG: at 15:19

## 2018-04-10 RX ADMIN — SUCCINYLCHOLINE CHLORIDE 170 MG: 20 INJECTION, SOLUTION INTRAMUSCULAR; INTRAVENOUS at 12:36

## 2018-04-10 RX ADMIN — HYDRALAZINE HYDROCHLORIDE 2.5 MG: 20 INJECTION INTRAMUSCULAR; INTRAVENOUS at 14:07

## 2018-04-10 RX ADMIN — HYDROCODONE BITARTRATE AND ACETAMINOPHEN 1 TABLET: 7.5; 325 TABLET ORAL at 23:07

## 2018-04-10 RX ADMIN — CEFAZOLIN SODIUM 2 G: 1 INJECTION, POWDER, FOR SOLUTION INTRAMUSCULAR; INTRAVENOUS at 13:16

## 2018-04-10 RX ADMIN — ONDANSETRON 4 MG: 2 INJECTION INTRAMUSCULAR; INTRAVENOUS at 15:06

## 2018-04-10 RX ADMIN — PROPOFOL 160 MG: 10 INJECTION, EMULSION INTRAVENOUS at 12:36

## 2018-04-10 RX ADMIN — HYDROMORPHONE HYDROCHLORIDE 0.5 MG: 1 INJECTION, SOLUTION INTRAMUSCULAR; INTRAVENOUS; SUBCUTANEOUS at 16:27

## 2018-04-10 RX ADMIN — LABETALOL HYDROCHLORIDE 5 MG: 5 INJECTION, SOLUTION INTRAVENOUS at 13:56

## 2018-04-10 RX ADMIN — POTASSIUM CHLORIDE AND SODIUM CHLORIDE 75 ML/HR: 450; 150 INJECTION, SOLUTION INTRAVENOUS at 18:10

## 2018-04-10 RX ADMIN — DEXAMETHASONE SODIUM PHOSPHATE 4 MG: 4 INJECTION, SOLUTION INTRAMUSCULAR; INTRAVENOUS at 15:13

## 2018-04-10 RX ADMIN — LIDOCAINE HYDROCHLORIDE 100 MG: 20 INJECTION, SOLUTION INFILTRATION; PERINEURAL at 12:36

## 2018-04-10 RX ADMIN — ROCURONIUM BROMIDE 5 MG: 10 INJECTION INTRAVENOUS at 12:36

## 2018-04-10 RX ADMIN — FENTANYL CITRATE 50 MCG: 50 INJECTION, SOLUTION INTRAMUSCULAR; INTRAVENOUS at 13:15

## 2018-04-10 RX ADMIN — LOSARTAN POTASSIUM 100 MG: 50 TABLET, FILM COATED ORAL at 18:10

## 2018-04-10 RX ADMIN — FENTANYL CITRATE 50 MCG: 50 INJECTION, SOLUTION INTRAMUSCULAR; INTRAVENOUS at 12:36

## 2018-04-10 RX ADMIN — CETIRIZINE HYDROCHLORIDE 10 MG: 10 TABLET, FILM COATED ORAL at 21:19

## 2018-04-10 RX ADMIN — HYDROMORPHONE HYDROCHLORIDE 1 MG: 1 INJECTION, SOLUTION INTRAMUSCULAR; INTRAVENOUS; SUBCUTANEOUS at 20:47

## 2018-04-10 NOTE — ANESTHESIA PROCEDURE NOTES
Arterial Line    Patient location during procedure: OR   Line placed for hemodynamic monitoring.  Performed By   CRNA: ANN-MARIE FERRELL  Arterial Line Prep   Sterile Tech: gloves  Prep: ChloraPrep  Patient monitoring: blood pressure monitoring, continuous pulse oximetry and EKG  Arterial Line Procedure   Laterality:right  Location:  radial artery   Guidance: palpation technique  Number of attempts: 2  Successful placement: yes          Post Assessment   Dressing Type: secured with tape and occlusive dressing applied.   Complications no  Circ/Move/Sens Assessment: normal.   Patient Tolerance: patient tolerated the procedure well with no apparent complications  Additional Notes  Attempted A-line placement on left radial unsuccessful. A-line placed on right radial.

## 2018-04-10 NOTE — BRIEF OP NOTE
PARATHYROIDECTOMY  Progress Note    Rekha Dorado  4/10/2018    Pre-op Diagnosis:   Hyperparathyroidism [E21.3]       Post-Op Diagnosis Codes:     * Hyperparathyroidism [E21.3]    Procedure(s):  PARATHYROIDECTOMY    Surgeon(s):  Stanley Rodriguez MD    Anesthesia: General    Staff:   Circulator: Avelina Brower RN; Jacqui Porter RN  Scrub Person: Brittany Beatty; Venita Warren V  Assistant: Riya Carreon CSA    Estimated Blood Loss: none    Urine Voided: 250 mL    Specimens:                ID Type Source Tests Collected by Time   A : left lower pole. is this parathyroid? Tissue Parathyroid Gland TISSUE PATHOLOGY EXAM Stanley Rodriguez MD 4/10/2018 1423   B : left upper pole. is this parathyoid? Tissue Parathyroid Gland TISSUE PATHOLOGY EXAM Stanley Rodriguez MD 4/10/2018 1429   C : further upper left pole. is this parathyroid? Tissue Parathyroid Gland TISSUE PATHOLOGY EXAM Stanley Rodriguez MD 4/10/2018 1435   D : right upper pole. parathyroid? Tissue Parathyroid Gland TISSUE PATHOLOGY EXAM Stanley Rodriguez MD 4/10/2018 1441         Drains:  None    Findings: RUP adenoma, normal LUP and LLP parathyroid glands    Complications: None      Stanley Rodriguez MD     Date: 4/10/2018  Time: 3:18 PM

## 2018-04-10 NOTE — ANESTHESIA PROCEDURE NOTES
Airway  Urgency: elective    Airway not difficult    General Information and Staff    Patient location during procedure: OB    Indications and Patient Condition  Indications for airway management: airway protection    Preoxygenated: yes  MILS maintained throughout  Mask difficulty assessment: 2 - vent by mask + OA or adjuvant +/- NMBA    Final Airway Details  Final airway type: endotracheal airway      Successful airway: ETT  Cuffed: yes   Successful intubation technique: direct laryngoscopy  Endotracheal tube insertion site: oral  Blade: Alexis  Blade size: #3  ETT size: 7.0 mm  Cormack-Lehane Classification: grade IIa - partial view of glottis  Placement verified by: chest auscultation and capnometry   Measured from: teeth  ETT to teeth (cm): 21  Number of attempts at approach: 1

## 2018-04-10 NOTE — ANESTHESIA POSTPROCEDURE EVALUATION
Patient: Rekha Dorado    Procedure Summary     Date:  04/10/18 Room / Location:  [facility] OR 03 / [facility] OR    Anesthesia Start:  1232 Anesthesia Stop:  1541    Procedure:  PARATHYROIDECTOMY (N/A Neck) Diagnosis:       Hyperparathyroidism      (Hyperparathyroidism [E21.3])    Surgeon:  Stanley Rodriguez MD Provider:  Francisco Castañeda MD    Anesthesia Type:  general ASA Status:  3          Anesthesia Type: general  Last vitals  BP   170/72 (04/10/18 0922)   Temp   98.2 °F (36.8 °C) (04/10/18 0922)   Pulse   72 (04/10/18 0922)   Resp   18 (04/10/18 0922)     SpO2   94 % (04/10/18 0922)     Post Anesthesia Care and Evaluation    Patient location during evaluation: PACU  Patient participation: complete - patient participated  Level of consciousness: sleepy but conscious  Pain management: adequate  Airway patency: patent  Anesthetic complications: No anesthetic complications  PONV Status: none  Cardiovascular status: acceptable  Respiratory status: acceptable  Hydration status: acceptable

## 2018-04-10 NOTE — H&P (VIEW-ONLY)
Chief Complaint   Patient presents with   • Follow-up     Recheck hyperparathyroidism        HPI  Primary hyperparathyroidism.     Diagnosis has been proven, PTH is high normal, calcium is 11.2, urine calcium exceeds 500 mg.     Parathyroid scan and neck ultrasound did not reveal parathyroid adenoma.     Referred her for surgery given that she has many indications: Nephrolithiasis, osteoporosis, calcium level that exceeds 11, urine calcium that exceeds 300.        She continues taking 2000 units of vitamin D and despite having high calcium she should be taking 400-600 mg daily to prevent worsening of osteoporosis and in the event that parathyroidectomy is done to prevent hungry bone syndrome.     She stopped Boniva and started Prolia     3wk ago  (1/23/18) 3wk ago  (1/23/18) 1mo ago  (12/19/17) 1mo ago  (12/19/17)          PTH, Intact 10.0 - 65.0 pg/mL 74.8 (H)   47.3      Calcium 8.4 - 10.2 mg/dL 12.9 (H) 12.8 (H) 11.5 (H) 11.1 (H)   Resulting Agency   [facility]       Specimen Collected: 01/23/18  8:58 AM Last Resulted: 01/24/18  7:49 AM        Previously seen in February and desires operation in April       Past Medical History:   Diagnosis Date   • Controlled type 2 diabetes mellitus without complication, without long-term current use of insulin 12/19/2017   • Left thyroid nodule 12/19/2017   • Primary hyperparathyroidism 12/19/2017   • Severe osteopetrosis 12/19/2017       No past surgical history on file.      Current Outpatient Prescriptions:   •  b complex-C-folic acid 1 MG capsule, Take 1 capsule by mouth Daily., Disp: , Rfl:   •  budesonide-formoterol (SYMBICORT) 160-4.5 MCG/ACT inhaler, Inhale 2 puffs 2 (Two) Times a Day., Disp: , Rfl:   •  carvedilol (COREG) 3.125 MG tablet, Take 3.125 mg by mouth 2 (Two) Times a Day With Meals., Disp: , Rfl:   •  cetirizine (zyrTEC) 10 MG tablet, Take 10 mg by mouth Daily., Disp: , Rfl:   •  cholecalciferol (VITAMIN D3) 1000 units  tablet, Take 1,000 Units by mouth Daily., Disp: , Rfl:   •  choline fenofibrate (TRILIPIX) 135 MG capsule, Take 135 mg by mouth Daily., Disp: , Rfl:   •  CRANBERRY EXTRACT PO, Take  by mouth Daily., Disp: , Rfl:   •  dicyclomine (BENTYL) 10 MG capsule, Take 10 mg by mouth., Disp: , Rfl:   •  doxycycline (VIBRAMYCIN) 50 MG capsule, Take 50 mg by mouth 2 (Two) Times a Day., Disp: , Rfl:   •  glucosamine sulfate 500 MG capsule capsule, Take  by mouth 3 (Three) Times a Day With Meals., Disp: , Rfl:   •  ibandronate (BONIVA) 150 MG tablet, Take 150 mg by mouth Every 30 (Thirty) Days., Disp: , Rfl:   •  KRILL OIL PO, Take  by mouth Daily., Disp: , Rfl:   •  losartan (COZAAR) 100 MG tablet, Take 100 mg by mouth Daily., Disp: , Rfl:   •  metroNIDAZOLE (METROGEL) 0.75 % vaginal gel, Insert  into the vagina 2 (Two) Times a Day., Disp: , Rfl:   •  montelukast (SINGULAIR) 10 MG tablet, Take 10 mg by mouth Every Night., Disp: , Rfl:   •  olopatadine (PATADAY) 0.2 % solution ophthalmic solution, Daily., Disp: , Rfl:   •  omeprazole (priLOSEC) 20 MG capsule, Take 20 mg by mouth Daily., Disp: , Rfl:   •  SITagliptin 100 MG tablet 1 tablet, metFORMIN  MG tablet sustained-release 24 hour 2 tablet, Take 1 dose by mouth Daily With Breakfast., Disp: , Rfl:   •  SITagliptin-MetFORMIN HCl -1000 MG tablet sustained-release 24 hour, One tab po daily with bkfast, Disp: 30 tablet, Rfl: 11  •  Sulfacetamide Sodium-Sulfur (PLEXION CLEANSER EX), Apply  topically., Disp: , Rfl:     No Known Allergies    Family History   Problem Relation Age of Onset   • Diabetes Mother        Social History     Social History   • Marital status:      Spouse name: N/A   • Number of children: N/A   • Years of education: N/A     Occupational History   • Not on file.     Social History Main Topics   • Smoking status: Never Smoker   • Smokeless tobacco: Never Used   • Alcohol use No   • Drug use: Unknown   • Sexual activity: Not on file     Other  Topics Concern   • Not on file     Social History Narrative   • No narrative on file       Review of Systems   Constitutional: Negative for appetite change, chills, fever and unexpected weight change.   HENT: Negative for hearing loss, nosebleeds and trouble swallowing.    Eyes: Negative for visual disturbance.   Respiratory: Negative for apnea, cough, choking, chest tightness, shortness of breath, wheezing and stridor.    Cardiovascular: Negative for chest pain, palpitations and leg swelling.   Gastrointestinal: Negative for abdominal distention, abdominal pain, blood in stool, constipation, diarrhea, nausea and vomiting.   Endocrine: Negative for cold intolerance, heat intolerance, polydipsia, polyphagia and polyuria.   Genitourinary: Negative for difficulty urinating, dysuria, frequency, hematuria and urgency.   Musculoskeletal: Negative for arthralgias, back pain, myalgias and neck pain.   Skin: Negative for color change, pallor and rash.   Allergic/Immunologic: Negative for immunocompromised state.   Neurological: Negative for dizziness, seizures, syncope, light-headedness, numbness and headaches.   Hematological: Negative for adenopathy.   Psychiatric/Behavioral: Negative for suicidal ideas. The patient is not nervous/anxious.        Physical Exam   Constitutional: She appears well-developed and well-nourished.   HENT:   Head: Normocephalic and atraumatic.   Eyes: EOM are normal. Pupils are equal, round, and reactive to light. No scleral icterus.   Neck: Normal range of motion. Neck supple. No JVD present. No tracheal deviation present. No thyromegaly present.   Cardiovascular: Normal rate and regular rhythm.    Pulmonary/Chest: Effort normal and breath sounds normal. No stridor.   Lymphadenopathy:     She has no cervical adenopathy.   Psychiatric: She has a normal mood and affect. Her behavior is normal. Judgment and thought content normal.   Vitals reviewed.        ASSESSMENT    Rekha was seen today for  follow-up.    Diagnoses and all orders for this visit:    Hyperparathyroidism  -     Case Request; Standing  -     Case Request    Other orders  -     Follow Anesthesia Guidelines / Standing Orders; Future  -     Follow Anesthesia Guidelines / Standing Orders; Standing  -     Provide instructions to patient on NPO status  -     Obtain informed consent; Standing  -     Place sequential compression device- to be placed on patient in Pre-op; Standing        PLAN    1. Parathyroidectomy    What are the indications that have led your doctor to the opinion that an operation is necessary?      It is explained that there are four parathyroid glands in the neck located behind the thyroid gland. They are very small (each the size of a small pea) and they control how much calcium is in the body. Most of the time, the parathyroid glands work perfectly. In this case, one or more of the parathyroid glands have become overactive. This causes the body to have too much calcium in the bloodstream, a condition called primary hyperparathyroidism. This has been detected by a blood test to determine the level of your body’s calcium and parathyroid hormone (also referred to as PTH). While calcium is important to one's health, too much calcium can produce several unwanted symptoms. These can include muscle and bone pain, osteoporosis, fatigue, weakness, kidney stones, and depression.   Usually, the cause of primary hyperparathyroidism is a benign (non-cancerous) tumor in one of the parathyroid glands. This tumor is called a parathyroid adenoma. There are other less common causes of excessive calcium as well. Sometimes, all four glands become overactive, a condition called parathyroid hyperplasia.     What, if any, alternative treatments are available for your condition?    While there is medical treatment available for hyperparathyroidism, is is usually reserved for patients with renal failure or those who either cannot undergo or fail  surgical management.    What will be the likely result if you don't have the operation?    Symptoms, including muscle and bone pain, osteoporosis, fatigue, weakness, kidney stones, and depression may occur. While may patients remain asymptomatic, the risk of developing symptoms is unpredictable and, therefore, surgery is suggested.     What are the basic procedures involved in the operation?    Surgery is done through an incision in the neck. After removing the abnormal parathyroid gland(s), your calcium and PTH levels will return to normal and symptoms will soon resolve. Blood tests in surgery are used to confirm successful treatment of the disease.    What are the risks?    Risks of surgery include but are not limited to:    BLEEDING: Minor bleeding from the incision is typically not a problem; however, heavy bleeding deeper in the neck can be very serious and can potentially cause difficulty with breathing. If not addressed in a timely fashion it can possibly cause suffocation.    HYPO?PARATHYROIDISM OR LOW BLOOD CALCIUM: This is a problem that can occur if the entire thyroid gland was removed or if you have had surgery for hyperparathyroidism. You may or may not have been prescribed calcium and vitamin D after surgery. If you develop tingling of your lips, fingers or toes or if your muscles feel spastic or cramping, notify us immediately so this can be also managed in a timely fashion. If this occurs, it is usually temporary, but in rare instances, low blood calcium may be permanent.     HOARSENESS: Parathyroid surgery involves dissection around the nerves that control the vocal cords. The vocal cord muscles rotate out to an open position while taking a breath of air and are rotated in and squeezed together in the midline in order to create voice. Each vocal cord has two nerves that control its movement. The larger nerve travels up from below the vocal cords and is named the recurrent laryngeal nerve. It is  responsible for the majority of the movement of the vocal cord. A smaller nerve travels down from above the voice box to control a muscle on the outside of the voice box (larynx). It is named the superior laryngeal nerve, and it helps in fine-tuning the voice allowing for control of your pitch and your ability to project the voice. Surgery to the parathyroid glands places all these nerves at risk for injury. One-sided surgery places the upper and lower nerves on that side at risk for injury. Surgery to both sides places all 4 nerves at risk for injury. Permanent damage to these nerves is rare. Temporary injury to these nerves occurs on occasion. The symptoms of laryngeal nerve injury depend on which nerve(s) is affected and can be as minor as mild hoarseness or can be more serious as severe hoarseness. Extremely rarely, if both recurrent laryngeal nerves were to be injured, this could lead to difficulty in breathing in air, necessitating emergency airway control by tracheotomy    INFECTION: Infection after parathyroidectomy is very rare.     POSSIBLE NEED FOR FURTHER TREATMENT AND/OR SURGERY: Depending on the problem for which the surgery was performed, as well as the result of the specimens that were sent to the laboratory for pathologic study, sometimes further surgical therapy or further medical therapy is needed.     SCARRING: While we strive and normally achieve excellent scar camouflage, occasionally there is a poor cosmetic result.. The scar is typically placed in a previous existing neck crease. Scars are typically raised, somewhat red and bumpy for three months, but over the next year or so, the scar softens, matures and becomes much less visible. Very rarely poor scarring can occur. In these rare instances, scar revision can be performed.     NON?RESOLUTION OF THE PROBLEM: Depending on the condition that necessitated the surgery, the outcomes may vary. With a single tumor, the cure rate approaches 100%. On  occasion, a second tumor might become evident over time, and additional surgeries might be needed. With enlargement of all of the glands, there is up to a 15% chance that the first surgery is not curative, and further surgeries might be necessary.     As with any type of surgery, the risks of anesthesia such as drug reaction, breathing difficulties and even death are possible.       How is the operation expected to improve your health or quality of life?    Is hospitalization necessary and, if so, how long can you expect to be hospitalized?    Patients who undergo parathyroid surgery are often discharged home from the hospital the same day. Narcotic and non-narcotic pain medicine is usually prescribed to be taken on an as needed basis.     What can you expect during your recovery period?    In most cases, primary hyperparathyroidism is cured with surgery. This decreases the risks associated with calcium levels that have become too high.    When can you expect to resume normal activities?    Normal activity can be resumed in 2-3 weeks.    Are there likely to be residual effects from the operation?    There are usually no long term residual effects of parathyroid surgery.    Parathyroidectomy is explained with the risks and benefits of surgery.      All questions are answered. She understands and agrees to operation.          This document has been electronically signed by Stanley Rodriguez MD on March 30, 2018 11:08 AM

## 2018-04-11 VITALS
HEIGHT: 69 IN | OXYGEN SATURATION: 94 % | TEMPERATURE: 96.4 F | RESPIRATION RATE: 18 BRPM | SYSTOLIC BLOOD PRESSURE: 131 MMHG | HEART RATE: 66 BPM | WEIGHT: 213.85 LBS | BODY MASS INDEX: 31.67 KG/M2 | DIASTOLIC BLOOD PRESSURE: 63 MMHG

## 2018-04-11 PROBLEM — E21.3 HYPERPARATHYROIDISM (HCC): Status: RESOLVED | Noted: 2018-03-30 | Resolved: 2018-04-11

## 2018-04-11 LAB
CALCIUM SPEC-SCNC: 10.6 MG/DL (ref 8.4–10.2)
PHOSPHATE SERPL-MCNC: 2.6 MG/DL (ref 2.4–4.4)

## 2018-04-11 PROCEDURE — 25010000002 HYDROMORPHONE PER 4 MG: Performed by: SURGERY

## 2018-04-11 PROCEDURE — 84100 ASSAY OF PHOSPHORUS: CPT | Performed by: SURGERY

## 2018-04-11 PROCEDURE — 82310 ASSAY OF CALCIUM: CPT | Performed by: SURGERY

## 2018-04-11 PROCEDURE — 25810000003 POTASSIUM CHLORIDE PER 2 MEQ: Performed by: SURGERY

## 2018-04-11 RX ADMIN — PANTOPRAZOLE SODIUM 40 MG: 40 TABLET, DELAYED RELEASE ORAL at 05:25

## 2018-04-11 RX ADMIN — POTASSIUM CHLORIDE AND SODIUM CHLORIDE 75 ML/HR: 450; 150 INJECTION, SOLUTION INTRAVENOUS at 08:27

## 2018-04-11 RX ADMIN — CARVEDILOL 3.12 MG: 3.12 TABLET, FILM COATED ORAL at 08:25

## 2018-04-11 RX ADMIN — LOSARTAN POTASSIUM 100 MG: 50 TABLET, FILM COATED ORAL at 08:25

## 2018-04-11 RX ADMIN — HYDROMORPHONE HYDROCHLORIDE 1 MG: 1 INJECTION, SOLUTION INTRAMUSCULAR; INTRAVENOUS; SUBCUTANEOUS at 01:30

## 2018-04-11 RX ADMIN — HYDROCODONE BITARTRATE AND ACETAMINOPHEN 1 TABLET: 7.5; 325 TABLET ORAL at 05:24

## 2018-04-11 NOTE — NURSING NOTE
Also instructed patient that if pain changed or increased to call her physician or Dr. Lucia office.

## 2018-04-11 NOTE — NURSING NOTE
"Spoke with patient about no PO pain medicine ordered on discharge and if she wanted me to contact Dr. Rodriguez about this. She said \"Thats not necessary, I'll just take extra strength tylenol.\" I told her it was not a problem to get something ordered if she needed it but she said that was fine.   Went over AVS with patient.   "

## 2018-04-11 NOTE — DISCHARGE SUMMARY
Discharge Summary  Date: 04/11/18  Service: General Surgery  Attending: Stanley Rodriguez MD    Procedures: Resection of parathyroid adenoma  Consults: None  Discharge Diagnoses: Hyperparathyroidism   Secondary Diagnosis:Non-Hospital Problem List        Noted       Controlled type 2 diabetes mellitus without complication, without long-term current use of insulin 12/19/2017   Goiter 12/19/2017   [place] bone 12/19/2017   Localized osteoporosis without current pathological fracture        Reason for hospitalization: Postop for wound care and pain control   Significant Findings: Right upper pole parathyroid adenoma   Patient Condition on Discharge: Improved   Hospital Course: No postoperative problems.  Parathormone level was 9 at the time of discharge.  Calcium was 10.6.  To be rechecked in the office soon.  The following discharge instructions were provided to the patient:    ACTIVITIES:  1. Keep your head elevated at 30 degrees or more as much as possible for the first 24 hours after surgery.  This will help reduce your postoperative swelling and thus decrease your pain.  2. Expect to rest most of the first two days after surgery, but do get up several times daily to reduce the risk of getting a clot in your legs.  3. No strenuous activity or lifting over 10 lbs. for one week.  Add 5 lbs. a week to that restriction until 6 weeks out from surgery.  Try to avoid squatting and deep bends for about a week.  4. Do not drive while on pain medicine.  You must be off pain meds 24 hours before driving.  5. You can climb stairs, but minimize this and initially do one step at a time (both feet on one step rather than going up with each step.)  SYMPTOMS:  1. Some minor discomfort with swallowing can be expected.  Report any extensive difficulty to the office immediately.  Most symptoms resolve within a few days.   2. You may have numbness around the mouth or cramping after surgery. This may be a sign of a low calcium level or a rapid  drop in calcium level. You may take calcitriol if prescribed as directed for your symptoms to help with calcium absorption.  3. Some neck swelling is expected and will resolve over the next few weeks.  Rapid neck swelling should prompt a call to the office or 911.  4. Constipation is common when taking pain medication for surgery.  Over the counter laxatives, such as Miralax, can be used temporarily.   5. Fatigue and decreased stamina is not unusual for about a week or so after surgery due to anesthesia.  Try to take walks and some mild activity between resting.  WOUND SITE:  1. Dressings for this surgery are minimal and consist of a steristrip over the incision with sutures internally.  This will begin to peel off after the first couple of weeks.  2. Showering may occur while the “film” is in place the day after surgery.   MEALS:  1. A soft diet is recommended the first 1-2 days after surgery and this can be expanded to a regular diet as tolerated.  2. Do NOT take pain pills on an empty stomach.  WORK:  1. In general if you have a sedentary job, you can return to work in 7-10 days.  If lifting or exertion is required it may be 2-3 weeks.    2. Use discretion and remember that your stamina will be decreased post operatively.  3. Return to work notes can be provided at the time of your post-operative appointment.  FOLLOW UP:  1. If no appointment is given, please call and make a post-operative appointment for approximately 7-10 days after the procedure.        Discharge Medications:     Your medication list      CONTINUE taking these medications      Instructions Last Dose Given Next Dose Due   aspirin 81 MG EC tablet      Take 81 mg by mouth Every Night.       budesonide-formoterol 160-4.5 MCG/ACT inhaler  Commonly known as:  SYMBICORT      Inhale 2 puffs 2 (Two) Times a Day.       carvedilol 3.125 MG tablet  Commonly known as:  COREG      Take 3.125 mg by mouth 2 (Two) Times a Day With Meals.       cetirizine 10 MG  tablet  Commonly known as:  zyrTEC      Take 10 mg by mouth Every Night.       CRANBERRY EXTRACT PO      Take  by mouth 2 (Two) Times a Day. 2 tabs       dicyclomine 10 MG capsule  Commonly known as:  BENTYL      Take 10 mg by mouth 3 (Three) Times a Day As Needed.       glucosamine sulfate 500 MG capsule capsule      Take  by mouth 2 (Two) Times a Day. 1 tab in the morning and 2 tabs at night       KRILL OIL PO      Take  by mouth Every Night.       losartan 100 MG tablet  Commonly known as:  COZAAR      Take 100 mg by mouth Daily.       montelukast 10 MG tablet  Commonly known as:  SINGULAIR      Take 10 mg by mouth Every Night.       omeprazole 20 MG capsule  Commonly known as:  priLOSEC      Take 20 mg by mouth Every Night.       PATADAY 0.2 % solution ophthalmic solution  Generic drug:  olopatadine      Daily As Needed.       SITagliptin-MetFORMIN HCl -1000 MG tablet  Commonly known as:  JANUMET XR      Take 1 tablet by mouth Daily.       TRILIPIX 135 MG capsule  Generic drug:  choline fenofibrate      Take 135 mg by mouth Every Night.       Vitamin D 2000 units tablet      Take 2,000 Units by mouth Daily.              Your Scheduled Appointments    Apr 19, 2018  9:30 AM CDT  INJECTION with SHAWNA CONTRERAS OP INFU PROCEDURE CHAIR  [facility] OP INFUSION ([address] [address] [phone]                This document has been electronically signed by Stanley Rodriguez MD on April 11, 2018 9:13 AM

## 2018-04-11 NOTE — DISCHARGE INSTRUCTIONS
PARATHYROID ADENOMA EXCISION  POST OP INSTRUCTIONS  Dr. Stanley Rodriguez  [address] [place] [address]  Phone: [phone] (office)  [phone] (hospital)    ACTIVITIES:  1. Keep your head elevated at 30 degrees or more as much as possible for the first 24 hours after surgery.  This will help reduce your postoperative swelling and thus decrease your pain.  2. Expect to rest most of the first two days after surgery, but do get up several times daily to reduce the risk of getting a clot in your legs.  3. No strenuous activity or lifting over 10 lbs. for one week.  Add 5 lbs. a week to that restriction until 6 weeks out from surgery.  Try to avoid squatting and deep bends for about a week.  4. Do not drive while on pain medicine.  You must be off pain meds 24 hours before driving.  5. You can climb stairs, but minimize this and initially do one step at a time (both feet on one step rather than going up with each step.)  SYMPTOMS:  1. Some minor discomfort with swallowing can be expected.  Report any extensive difficulty to the office immediately.  Most symptoms resolve within a few days.   2. You may have numbness around the mouth or cramping after surgery. This may be a sign of a low calcium level or a rapid drop in calcium level. You may take calcitriol if prescribed as directed for your symptoms to help with calcium absorption.  3. Some neck swelling is expected and will resolve over the next few weeks.  Rapid neck swelling should prompt a call to the office or 911.  4. Constipation is common when taking pain medication for surgery.  Over the counter laxatives, such as Miralax, can be used temporarily.   5. Fatigue and decreased stamina is not unusual for about a week or so after surgery due to anesthesia.  Try to take walks and some mild activity between resting.  WOUND SITE:  1. Dressings for this surgery are minimal and consist of a steristrip over the incision with sutures internally.  This will  begin to peel off after the first couple of weeks.  2. Showering may occur while the “film” is in place the day after surgery.   MEALS:  1. A soft diet is recommended the first 1-2 days after surgery and this can be expanded to a regular diet as tolerated.  2. Do NOT take pain pills on an empty stomach.  WORK:  1. In general if you have a sedentary job, you can return to work in 7-10 days.  If lifting or exertion is required it may be 2-3 weeks.    2. Use discretion and remember that your stamina will be decreased post operatively.  3. Return to work notes can be provided at the time of your post-operative appointment.  FOLLOW UP:  1. If no appointment is given, please call and make a post-operative appointment for approximately 7-10 days after the procedure.

## 2018-04-12 NOTE — OP NOTE
PARATHYROIDECTOMY  Procedure Note    Rekha Dorado  4/10/2018    Pre-op Diagnosis:   Hyperparathyroidism [E21.3]    Post-op Diagnosis:     Post-Op Diagnosis Codes:     * Hyperparathyroidism [E21.3]    Procedure:  PARATHYROIDECTOMY    Surgeon(s):  Stanley Rodriguez MD    Anesthesia: General    Staff:   Circulator: Avelina Brower RN; Jacqui Porter RN  Scrub Person: Brittany Beatty; Venita Warren V  Assistant: Riya Carreon CSA    Estimated Blood Loss: minimal    Specimens:                ID Type Source Tests Collected by Time   A : left lower pole. is this parathyroid? Tissue Parathyroid Gland TISSUE PATHOLOGY EXAM Stanley Rodriguez MD 4/10/2018 1423   B : left upper pole. is this parathyoid? Tissue Parathyroid Gland TISSUE PATHOLOGY EXAM Stanley Rodriguez MD 4/10/2018 1429   C : further upper left pole. is this parathyroid? Tissue Parathyroid Gland TISSUE PATHOLOGY EXAM Stanley Rodriguez MD 4/10/2018 1435   D : right upper pole. parathyroid? Tissue Parathyroid Gland TISSUE PATHOLOGY EXAM Stanley Rodriguez MD 4/10/2018 1441         Drains:      Findings: RUP parathyroid adenoma. Normal LUP and LLP glands. PTH drop from 68 to 9    Complications: None    Indications: This patient is a 62 -year-old woman with chemical evidence of hyperparathyroidism.  Sestamibi scanning and ultrasound were negative.  She was taken to the operating room today for parathyroidectomy.    Description of procedure: The patient was brought to the operating room and placed supine on the operating table.  After adequate general endotracheal anesthesia, the neck was prepped and draped in a sterile manner.  Briefing and timeout were performed and all parties were in agreement.    A transverse low collar skin incision was made and carried through the platysma.  Subplatysmal flaps were created superiorly and inferiorly.  Strap muscles were  and the anterior surface of the thyroid came into view.      The LEFT thyroid was rotated out of its bed by  dissecting away the loose areolar tissue between the thyroid and the underside of the straps.  The recurrent nerve was clearly visualized.  The middle thyroid vessels were encircled with a small blue loop and protected.    On the superior and inferior surfaces of the thyroid gland, 2 small parathyroid glands were discovered and confirmed by frozen section biopsy.      The RIGHT thyroid was rotated out of its bed by dissecting away the loose areolar tissue between the thyroid and the underside of the straps.  The recurrent nerve was clearly visualized.  The abnormal parathyroid gland was located on the superior aspect of the gland and carefully dissected free of its surrounding structures, isolating the blood vessels to that parathyroid.  The vessels were doubly clamped at their base and tied with 3-0 silk ligature.  Parathyroid was then removed from the operative field.  Frozen section of the parathyroid gland demonstrated probable adenoma.    Parathormone levels were drawn pre-postoperatively, and 15 minutes following removal of the abnormal gland.  They demonstrated a drop from 68 to 9.    Hemostasis was assured patient was given several Valsalva breaths.  No bleeding was noted.  Straps were re-approximated with interrupted 3-0 Vicryl suture; the platysma was re-approximated with interrupted 3-0 Vicryl suture.  In was brought together with continuous 4-0 subcuticular Vicryl suture.    The procedure was terminated.  She tolerated it well sponge and needle counts were correct.  She was returned to recovery in satisfactory condition.                This document has been electronically signed by Stanley Rodriguez MD on April 11, 2018 11:50 PM      Date: 4/11/2018  Time: 11:50 PM

## 2018-04-13 LAB
LAB AP CASE REPORT: NORMAL
Lab: NORMAL
Lab: NORMAL
PATH REPORT.FINAL DX SPEC: NORMAL
PATH REPORT.GROSS SPEC: NORMAL

## 2018-04-18 ENCOUNTER — OFFICE VISIT (OUTPATIENT)
Dept: SURGERY | Facility: CLINIC | Age: 63
End: 2018-04-18

## 2018-04-18 VITALS
HEIGHT: 69 IN | SYSTOLIC BLOOD PRESSURE: 120 MMHG | WEIGHT: 209.8 LBS | DIASTOLIC BLOOD PRESSURE: 60 MMHG | BODY MASS INDEX: 31.07 KG/M2

## 2018-04-18 DIAGNOSIS — E21.3 HYPERPARATHYROIDISM (HCC): Primary | ICD-10-CM

## 2018-04-18 PROCEDURE — 99024 POSTOP FOLLOW-UP VISIT: CPT | Performed by: SURGERY

## 2018-04-18 NOTE — PATIENT INSTRUCTIONS

## 2018-04-19 ENCOUNTER — INFUSION (OUTPATIENT)
Dept: ONCOLOGY | Facility: HOSPITAL | Age: 63
End: 2018-04-19

## 2018-04-19 DIAGNOSIS — E21.0 PRIMARY HYPERPARATHYROIDISM (HCC): ICD-10-CM

## 2018-04-19 DIAGNOSIS — M81.6 LOCALIZED OSTEOPOROSIS WITHOUT CURRENT PATHOLOGICAL FRACTURE: Primary | ICD-10-CM

## 2018-04-19 PROCEDURE — 25010000002 DENOSUMAB 60 MG/ML SOLUTION: Performed by: INTERNAL MEDICINE

## 2018-04-19 PROCEDURE — 96372 THER/PROPH/DIAG INJ SC/IM: CPT | Performed by: NURSE PRACTITIONER

## 2018-04-19 RX ADMIN — DENOSUMAB 60 MG: 60 INJECTION SUBCUTANEOUS at 09:31

## 2018-04-27 ENCOUNTER — TELEPHONE (OUTPATIENT)
Dept: FAMILY MEDICINE CLINIC | Facility: CLINIC | Age: 63
End: 2018-04-27

## 2018-04-27 ENCOUNTER — TELEPHONE (OUTPATIENT)
Dept: ENDOCRINOLOGY | Facility: CLINIC | Age: 63
End: 2018-04-27

## 2018-04-27 DIAGNOSIS — E11.9 CONTROLLED TYPE 2 DIABETES MELLITUS WITHOUT COMPLICATION, WITHOUT LONG-TERM CURRENT USE OF INSULIN (HCC): Primary | ICD-10-CM

## 2018-04-27 NOTE — TELEPHONE ENCOUNTER
Status   Sent to [facility]   DrugJanumet -1000MG er tablets   FormOptumRx Electronic Prior Authorization Form   Original Claim [id number]

## 2018-04-29 NOTE — PROGRESS NOTES
Chief Complaint   Patient presents with   • Post-op Follow-up     Post operative parathyroidectomy 4-10-18.        [facility]  Tissue Pathology Exam   Order: [id number]   Status:  Final result   Visible to patient:  Yes (MyChart)   Dx:  Hyperparathyroidism    2wk ago   Final Diagnosis   Sp #1 Tissue sample, Left lower pole:     Normal parathyroid     Sp # 2 and 3, Left upper pole:     Normal parathyroid     Sp # 4 Right Upper Pole, entire gland:     Parathyroid Adenoma   Electronically signed by Alverto Escobedo MD on 4/13/2018 at 1601   Intraoperative Consultation See result below    FROZEN SECTION DIAGNOSIS:   1.  Left lower pole, normal parathyroid tissue.      2.  Left upper pole, normal parathyroid tissue.      3.  Further left upper pole,  parathyroid tissue with fat and slightly enlarged (0.3 grams).      4.  Right upper pole, parathyroid adenoma.    Gross Description See result below    1.  The first specimen consists of a pinkish tan tissue from the left lower pole measuring 0.3 cm in greatest dimension.  All embedded as 1A.  Specimen subjected to frozen section.        2.  The second specimen consists of tissue sample from the left upper pole measuring 0.3 cm. All embedded as 2. Material subjected to frozen section.     3.  The third specimen consists of a pinkish tan tissue from the left upper pole measuring 0.8 cm in greatest dimension and it weighs 0.3 grams.  All embedded as 3A.   Specimen subjected to frozen section.      4.  The fourth specimen consists of a lobulated pinkish tan gland from the right upper pole measuring 4.0 x 1.0 cm and it weighs 1.5 grams.  All embedded as 4A. Specimen subjected to frozen section.      Resulting Agency [facility]      Specimen Collected: 04/10/18 14:23   Last Resulted: 04/13/18 16:01                 Scans on Order [id number]     Document on 4/13/2018  5:02 PM by Alverto Escobedo MD            PTH Intraoperative   Order: [id number]   Status:  Final result   Visible to  patient:  Yes (Radhat)    Ref Range & Units 2wk ago   PTH, Intraoperative 10.0 - 65.0 pg/mL 9.4                 Past Medical History:   Diagnosis Date   • Acid reflux    • Arthritis    • Asthma    • Controlled type 2 diabetes mellitus without complication, without long-term current use of insulin 12/19/2017   • High blood triglycerides    • Hypertension    • Left thyroid nodule 12/19/2017   • Primary hyperparathyroidism 12/19/2017   • Severe osteopetrosis 12/19/2017   • Skin cancer    • Sleep apnea        Past Surgical History:   Procedure Laterality Date   • CARPAL TUNNEL RELEASE Right    • CATARACT EXTRACTION Left    • CATARACT EXTRACTION Right    • CHOLECYSTECTOMY      lap   • DILATION AND CURETTAGE, DIAGNOSTIC / THERAPEUTIC     • HYSTERECTOMY     • KNEE ARTHROSCOPY Right    • BELLE'S NEUROMA EXCISION Right     foot   • ORIF WRIST FRACTURE Right    • PARATHYROIDECTOMY N/A 4/10/2018    Procedure: PARATHYROIDECTOMY;  Surgeon: Stanley Rodriguez MD;  Location: [facility];  Service: General   • ROTATOR CUFF REPAIR Right          Current Outpatient Prescriptions:   •  aspirin 81 MG EC tablet, Take 81 mg by mouth Every Night., Disp: , Rfl:   •  budesonide-formoterol (SYMBICORT) 160-4.5 MCG/ACT inhaler, Inhale 2 puffs 2 (Two) Times a Day., Disp: , Rfl:   •  carvedilol (COREG) 3.125 MG tablet, Take 3.125 mg by mouth 2 (Two) Times a Day With Meals., Disp: , Rfl:   •  cetirizine (zyrTEC) 10 MG tablet, Take 10 mg by mouth Every Night., Disp: , Rfl:   •  Cholecalciferol (VITAMIN D) 2000 units tablet, Take 2,000 Units by mouth Daily., Disp: , Rfl:   •  choline fenofibrate (TRILIPIX) 135 MG capsule, Take 135 mg by mouth Every Night., Disp: , Rfl:   •  CRANBERRY EXTRACT PO, Take  by mouth 2 (Two) Times a Day. 2 tabs, Disp: , Rfl:   •  dicyclomine (BENTYL) 10 MG capsule, Take 10 mg by mouth 3 (Three) Times a Day As Needed., Disp: , Rfl:   •  glucosamine sulfate 500 MG capsule capsule, Take  by mouth 2 (Two) Times a Day. 1 tab in the  morning and 2 tabs at night, Disp: , Rfl:   •  KRILL OIL PO, Take  by mouth Every Night., Disp: , Rfl:   •  losartan (COZAAR) 100 MG tablet, Take 100 mg by mouth Daily., Disp: , Rfl:   •  montelukast (SINGULAIR) 10 MG tablet, Take 10 mg by mouth Every Night., Disp: , Rfl:   •  olopatadine (PATADAY) 0.2 % solution ophthalmic solution, Daily As Needed., Disp: , Rfl:   •  omeprazole (priLOSEC) 20 MG capsule, Take 20 mg by mouth Every Night., Disp: , Rfl:   •  SITagliptin-MetFORMIN HCl ER (JANUMET XR) 100-1000 MG tablet, Take 1 tablet by mouth Daily., Disp: 30 tablet, Rfl: 11    No Known Allergies    Family History   Problem Relation Age of Onset   • Diabetes Mother        Social History     Social History   • Marital status:      Spouse name: N/A   • Number of children: N/A   • Years of education: N/A     Occupational History   • Not on file.     Social History Main Topics   • Smoking status: Never Smoker   • Smokeless tobacco: Never Used   • Alcohol use No   • Drug use: No   • Sexual activity: Defer     Other Topics Concern   • Not on file     Social History Narrative   • No narrative on file       Review of Systems  Nothing to add  Physical Exam   Neck: Normal range of motion. Neck supple. No JVD present. No tracheal deviation present. No thyromegaly present.   Healing incisions- no cellulitis- minor edema   Pulmonary/Chest: No stridor.   Lymphadenopathy:     She has no cervical adenopathy.         ASSESSMENT    Rekha was seen today for post-op follow-up.    Diagnoses and all orders for this visit:    Hyperparathyroidism        PLAN    1. Recheck in 1 month          This document has been electronically signed by Stanley Rodriguez MD on April 29, 2018 1:24 PM

## 2018-05-03 ENCOUNTER — TELEPHONE (OUTPATIENT)
Dept: FAMILY MEDICINE CLINIC | Facility: CLINIC | Age: 63
End: 2018-05-03

## 2018-05-15 ENCOUNTER — TELEPHONE (OUTPATIENT)
Dept: ENDOCRINOLOGY | Facility: CLINIC | Age: 63
End: 2018-05-15

## 2018-05-15 RX ORDER — METFORMIN HYDROCHLORIDE 500 MG/1
TABLET, EXTENDED RELEASE ORAL
Qty: 60 TABLET | Refills: 11 | Status: SHIPPED | OUTPATIENT
Start: 2018-05-15 | End: 2018-05-15 | Stop reason: CLARIF

## 2018-05-15 NOTE — TELEPHONE ENCOUNTER
Cancelled Trajenta and Metformin per Dr. Cook  ----- Message from Maxi Kraus MD sent at 5/15/2018  1:26 PM CDT -----  Yes , there was a paper from the ones I took that janumet wasn't paid for and I needed to find something else.     Please cancel the tradjenta and metformin and keep the janumet  ----- Message -----  From: Tracy Armstrong MA  Sent: 5/15/2018  11:30 AM  To: Maxi Kraus MD    The Trajenta you just sent is $201.00 copay. We have a $10 copay card but I don't know if it works with Medicare part D.??  Help,   Manuel was asking because she picked up the janumet 30 day RX like a week ago.  I am confused. Did I confuse you lol  Thanks, Tracy

## 2018-05-31 DIAGNOSIS — E21.0 PRIMARY HYPERPARATHYROIDISM (HCC): Primary | ICD-10-CM

## 2018-05-31 DIAGNOSIS — E04.1 LEFT THYROID NODULE: ICD-10-CM

## 2018-06-04 ENCOUNTER — LAB (OUTPATIENT)
Dept: LAB | Facility: CLINIC | Age: 63
End: 2018-06-04

## 2018-06-04 DIAGNOSIS — E21.0 PRIMARY HYPERPARATHYROIDISM (HCC): ICD-10-CM

## 2018-06-04 LAB
25(OH)D3 SERPL-MCNC: 62.2 NG/ML (ref 30–100)
ALBUMIN SERPL-MCNC: 4 G/DL (ref 3.4–4.8)
ALBUMIN/GLOB SERPL: 1.5 G/DL (ref 1.1–1.8)
ALP SERPL-CCNC: 96 U/L (ref 38–126)
ALT SERPL W P-5'-P-CCNC: 38 U/L (ref 9–52)
ANION GAP SERPL CALCULATED.3IONS-SCNC: 12 MMOL/L (ref 5–15)
AST SERPL-CCNC: 22 U/L (ref 14–36)
BASOPHILS # BLD AUTO: 0.02 10*3/MM3 (ref 0–0.2)
BASOPHILS NFR BLD AUTO: 0.3 % (ref 0–2)
BILIRUB SERPL-MCNC: 0.2 MG/DL (ref 0.2–1.3)
BUN BLD-MCNC: 19 MG/DL (ref 7–21)
BUN/CREAT SERPL: 22.1 (ref 7–25)
CALCIUM SPEC-SCNC: 10.2 MG/DL (ref 8.4–10.2)
CHLORIDE SERPL-SCNC: 103 MMOL/L (ref 95–110)
CO2 SERPL-SCNC: 23 MMOL/L (ref 22–31)
CREAT BLD-MCNC: 0.86 MG/DL (ref 0.5–1)
DEPRECATED RDW RBC AUTO: 43.5 FL (ref 36.4–46.3)
EOSINOPHIL # BLD AUTO: 0.27 10*3/MM3 (ref 0–0.7)
EOSINOPHIL NFR BLD AUTO: 4.4 % (ref 0–7)
ERYTHROCYTE [DISTWIDTH] IN BLOOD BY AUTOMATED COUNT: 12.7 % (ref 11.5–14.5)
GFR SERPL CREATININE-BSD FRML MDRD: 66 ML/MIN/1.73 (ref 45–104)
GLOBULIN UR ELPH-MCNC: 2.7 GM/DL (ref 2.3–3.5)
GLUCOSE BLD-MCNC: 149 MG/DL (ref 60–100)
HCT VFR BLD AUTO: 37.7 % (ref 35–45)
HGB BLD-MCNC: 12.3 G/DL (ref 12–15.5)
IMM GRANULOCYTES # BLD: 0.02 10*3/MM3 (ref 0–0.02)
IMM GRANULOCYTES NFR BLD: 0.3 % (ref 0–0.5)
LYMPHOCYTES # BLD AUTO: 1.76 10*3/MM3 (ref 0.6–4.2)
LYMPHOCYTES NFR BLD AUTO: 28.8 % (ref 10–50)
MCH RBC QN AUTO: 30.8 PG (ref 26.5–34)
MCHC RBC AUTO-ENTMCNC: 32.6 G/DL (ref 31.4–36)
MCV RBC AUTO: 94.5 FL (ref 80–98)
MONOCYTES # BLD AUTO: 0.37 10*3/MM3 (ref 0–0.9)
MONOCYTES NFR BLD AUTO: 6.1 % (ref 0–12)
NEUTROPHILS # BLD AUTO: 3.67 10*3/MM3 (ref 2–8.6)
NEUTROPHILS NFR BLD AUTO: 60.1 % (ref 37–80)
PHOSPHATE SERPL-MCNC: 3.9 MG/DL (ref 2.4–4.4)
PLATELET # BLD AUTO: 271 10*3/MM3 (ref 150–450)
PMV BLD AUTO: 11.2 FL (ref 8–12)
POTASSIUM BLD-SCNC: 4.1 MMOL/L (ref 3.5–5.1)
PROT SERPL-MCNC: 6.7 G/DL (ref 6.3–8.6)
RBC # BLD AUTO: 3.99 10*6/MM3 (ref 3.77–5.16)
SODIUM BLD-SCNC: 138 MMOL/L (ref 137–145)
TSH SERPL DL<=0.05 MIU/L-ACNC: 2.04 MIU/ML (ref 0.46–4.68)
WBC NRBC COR # BLD: 6.11 10*3/MM3 (ref 3.2–9.8)

## 2018-06-04 PROCEDURE — 80053 COMPREHEN METABOLIC PANEL: CPT | Performed by: INTERNAL MEDICINE

## 2018-06-04 PROCEDURE — 83970 ASSAY OF PARATHORMONE: CPT | Performed by: INTERNAL MEDICINE

## 2018-06-04 PROCEDURE — 84100 ASSAY OF PHOSPHORUS: CPT | Performed by: INTERNAL MEDICINE

## 2018-06-04 PROCEDURE — 85025 COMPLETE CBC W/AUTO DIFF WBC: CPT | Performed by: INTERNAL MEDICINE

## 2018-06-04 PROCEDURE — 84443 ASSAY THYROID STIM HORMONE: CPT | Performed by: INTERNAL MEDICINE

## 2018-06-04 PROCEDURE — 82306 VITAMIN D 25 HYDROXY: CPT | Performed by: INTERNAL MEDICINE

## 2018-06-04 PROCEDURE — 36415 COLL VENOUS BLD VENIPUNCTURE: CPT | Performed by: FAMILY MEDICINE

## 2018-06-06 LAB
CALCIUM SPEC-SCNC: 10.2 MG/DL (ref 8.4–10.2)
PTH-INTACT SERPL-MCNC: 8.3 PG/ML (ref 10–65)

## 2018-06-08 ENCOUNTER — OFFICE VISIT (OUTPATIENT)
Dept: ENDOCRINOLOGY | Facility: CLINIC | Age: 63
End: 2018-06-08

## 2018-06-08 VITALS
HEART RATE: 91 BPM | OXYGEN SATURATION: 97 % | SYSTOLIC BLOOD PRESSURE: 104 MMHG | HEIGHT: 69 IN | DIASTOLIC BLOOD PRESSURE: 72 MMHG | WEIGHT: 208.2 LBS | BODY MASS INDEX: 30.84 KG/M2

## 2018-06-08 DIAGNOSIS — E55.9 VITAMIN D DEFICIENCY: ICD-10-CM

## 2018-06-08 DIAGNOSIS — E04.0 SIMPLE GOITER: ICD-10-CM

## 2018-06-08 DIAGNOSIS — M80.00XS AGE-RELATED OSTEOPOROSIS WITH CURRENT PATHOLOGICAL FRACTURE, SEQUELA: ICD-10-CM

## 2018-06-08 DIAGNOSIS — E11.9 CONTROLLED TYPE 2 DIABETES MELLITUS WITHOUT COMPLICATION, WITHOUT LONG-TERM CURRENT USE OF INSULIN (HCC): Primary | ICD-10-CM

## 2018-06-08 PROCEDURE — 99214 OFFICE O/P EST MOD 30 MIN: CPT | Performed by: INTERNAL MEDICINE

## 2018-06-08 NOTE — PROGRESS NOTES
Rekha Dorado is a 63 y.o. female who presents for  evaluation of   Chief Complaint   Patient presents with   • Follow-up     post surgery        Referring provider    No referring provider defined for this encounter.    Primary Care Provider    Mariam Morgan MD    Very pleasant 63-year-old female sp right superior parathyroidectomy in April 2018 for primary hyperparathyroidism    Complications, she has severe osteoporosis complicated by  wrist fracture.    She also has history of nephrolithiasis.    Quantity, calcium has been as high as 12.8, now nl     Lab Results   Component Value Date    PTH 8.3 (L) 06/04/2018    CALCIUM 10.2 06/04/2018    CALCIUM 10.2 06/04/2018    PHOS 3.9 06/04/2018     Lab Results   Component Value Date    CALCIUM 10.2 06/04/2018    CALCIUM 10.2 06/04/2018    CALCIUM 10.6 (H) 04/11/2018    CALCIUM 11.5 (H) 03/30/2018    CALCIUM 12.8 (H) 01/23/2018    CALCIUM 12.9 (H) 01/23/2018    CALCIUM 11.1 (H) 12/19/2017    CALCIUM 11.5 (H) 12/19/2017         Alleviating factors she is taking Boniva for osteoporosis as well as vitamin D 2000 units daily.    ----------    She also has type 2 diabetes presently on metformin monotherapy with elevated fasting glucose    Past Medical History:   Diagnosis Date   • Acid reflux    • Arthritis    • Asthma    • Controlled type 2 diabetes mellitus without complication, without long-term current use of insulin 12/19/2017   • High blood triglycerides    • Hypertension    • Left thyroid nodule 12/19/2017   • Primary hyperparathyroidism 12/19/2017   • Severe osteopetrosis 12/19/2017   • Skin cancer    • Sleep apnea      Family History   Problem Relation Age of Onset   • Diabetes Mother      Social History   Substance Use Topics   • Smoking status: Never Smoker   • Smokeless tobacco: Never Used   • Alcohol use No         Current Outpatient Prescriptions:   •  aspirin 81 MG EC tablet, Take 81 mg by mouth Every Night., Disp: , Rfl:   •   budesonide-formoterol (SYMBICORT) 160-4.5 MCG/ACT inhaler, Inhale 2 puffs 2 (Two) Times a Day., Disp: , Rfl:   •  carvedilol (COREG) 3.125 MG tablet, Take 3.125 mg by mouth 2 (Two) Times a Day With Meals., Disp: , Rfl:   •  cetirizine (zyrTEC) 10 MG tablet, Take 10 mg by mouth Every Night., Disp: , Rfl:   •  Cholecalciferol (VITAMIN D) 2000 units tablet, Take 2,000 Units by mouth Daily., Disp: , Rfl:   •  choline fenofibrate (TRILIPIX) 135 MG capsule, Take 135 mg by mouth Every Night., Disp: , Rfl:   •  CRANBERRY EXTRACT PO, Take  by mouth 2 (Two) Times a Day. 2 tabs, Disp: , Rfl:   •  dicyclomine (BENTYL) 10 MG capsule, Take 10 mg by mouth 3 (Three) Times a Day As Needed., Disp: , Rfl:   •  glucosamine sulfate 500 MG capsule capsule, Take  by mouth 2 (Two) Times a Day. 1 tab in the morning and 2 tabs at night, Disp: , Rfl:   •  KRILL OIL PO, Take  by mouth Every Night., Disp: , Rfl:   •  losartan (COZAAR) 100 MG tablet, Take 100 mg by mouth Daily., Disp: , Rfl:   •  montelukast (SINGULAIR) 10 MG tablet, Take 10 mg by mouth Every Night., Disp: , Rfl:   •  olopatadine (PATADAY) 0.2 % solution ophthalmic solution, Daily As Needed., Disp: , Rfl:   •  omeprazole (priLOSEC) 20 MG capsule, Take 20 mg by mouth Every Night., Disp: , Rfl:   •  SITagliptin-MetFORMIN HCl ER (JANUMET XR) 100-1000 MG tablet, Take 1 tablet by mouth Daily., Disp: 30 tablet, Rfl: 11    Review of Systems    Review of Systems   Constitutional: Negative for activity change, appetite change, chills, diaphoresis, fatigue, fever and unexpected weight change.   HENT: Negative for congestion, dental problem, drooling, ear discharge, ear pain, facial swelling, mouth sores, postnasal drip, rhinorrhea, sinus pressure, sore throat, tinnitus, trouble swallowing and voice change.    Eyes: Negative for photophobia, pain, discharge, redness, itching and visual disturbance.   Respiratory: Negative for apnea, cough, choking, chest tightness, shortness of breath,  "wheezing and stridor.    Cardiovascular: Negative for chest pain, palpitations and leg swelling.   Gastrointestinal: Negative for abdominal distention, abdominal pain, constipation, diarrhea, nausea and vomiting.   Endocrine: Negative for cold intolerance, heat intolerance, polydipsia, polyphagia and polyuria.   Genitourinary: Negative for decreased urine volume, difficulty urinating, dysuria, flank pain, frequency, hematuria and urgency.   Musculoskeletal: Negative for arthralgias, back pain, gait problem, joint swelling, myalgias, neck pain and neck stiffness.   Skin: Negative for color change, pallor, rash and wound.   Allergic/Immunologic: Negative for immunocompromised state.   Neurological: Negative for dizziness, tremors, seizures, syncope, facial asymmetry, speech difficulty, weakness, light-headedness, numbness and headaches.   Hematological: Negative for adenopathy.   Psychiatric/Behavioral: Negative for agitation, behavioral problems, confusion, decreased concentration, dysphoric mood, hallucinations, self-injury, sleep disturbance and suicidal ideas. The patient is not nervous/anxious and is not hyperactive.         Objective:   /72 (BP Location: Left arm, Patient Position: Sitting, Cuff Size: Large Adult)   Pulse 91   Ht 175.3 cm (69\")   Wt 94.4 kg (208 lb 3.2 oz)   SpO2 97%   BMI 30.75 kg/m²     Physical Exam   Constitutional: She is oriented to person, place, and time. She appears well-developed.   HENT:   Head: Normocephalic.   Right Ear: External ear normal.   Left Ear: External ear normal.   Nose: Nose normal.   Eyes: Conjunctivae and EOM are normal. No scleral icterus.   Neck: Normal range of motion. Neck supple. No tracheal deviation present. Thyromegaly present.   Cardiovascular: Normal rate, regular rhythm, normal heart sounds and intact distal pulses.  Exam reveals no gallop and no friction rub.    No murmur heard.  Pulmonary/Chest: Effort normal and breath sounds normal. No " stridor. No respiratory distress. She has no wheezes. She has no rales. She exhibits no tenderness.   Abdominal: Soft. Bowel sounds are normal. She exhibits no distension and no mass. There is no tenderness. There is no rebound and no guarding.   Musculoskeletal: Normal range of motion. She exhibits no tenderness or deformity.   Lymphadenopathy:     She has no cervical adenopathy.   Neurological: She is alert and oriented to person, place, and time. She displays normal reflexes. She exhibits normal muscle tone. Coordination normal.   Skin: No rash noted. No erythema. No pallor.   Psychiatric: She has a normal mood and affect. Her behavior is normal. Judgment and thought content normal.       Lab Review    Results for orders placed or performed in visit on 06/04/18   Vitamin D 25 Hydroxy   Result Value Ref Range    25 Hydroxy, Vitamin D 62.2 30.0 - 100.0 ng/ml   Phosphorus   Result Value Ref Range    Phosphorus 3.9 2.4 - 4.4 mg/dL           Assessment/Plan       ICD-10-CM ICD-9-CM   1. Primary hyperparathyroidism E21.0 252.01   2. Controlled type 2 diabetes mellitus without complication, without long-term current use of insulin E11.9 250.00   3. Localized osteoporosis without current pathological fracture M81.6 733.09   4. Age-related osteoporosis with current pathological fracture, sequela M80.00XS 905.5     733.01   5. Left thyroid nodule E04.1 241.0         Diabetes, on Janumet 100/1000 mg extended release one tablet with breakfast.    ===========    Primary hyperparathyroidism - cured by right sup parathyroidectomy in Feb 2018    2000 units of vitamin D and calcium 400 to 600 mg bid, change to qd if eating dietary calcium     Now on prolia     --    Diffuse goiter, nl TSH    Measure tpo ab and TSI next time    No need for repeat US , just follow TSH     A copy of my note was sent to Mariam Morgan MD    Please see my above opinion and suggestions.

## 2018-10-23 ENCOUNTER — INFUSION (OUTPATIENT)
Dept: ONCOLOGY | Facility: HOSPITAL | Age: 63
End: 2018-10-23

## 2018-10-23 DIAGNOSIS — M81.6 LOCALIZED OSTEOPOROSIS WITHOUT CURRENT PATHOLOGICAL FRACTURE: Primary | ICD-10-CM

## 2018-10-23 DIAGNOSIS — E21.0 PRIMARY HYPERPARATHYROIDISM (HCC): ICD-10-CM

## 2018-10-23 LAB
ALBUMIN SERPL-MCNC: 4.1 G/DL (ref 3.4–4.8)
ALBUMIN/GLOB SERPL: 1.3 G/DL (ref 1.1–1.8)
ALP SERPL-CCNC: 75 U/L (ref 38–126)
ALT SERPL W P-5'-P-CCNC: 38 U/L (ref 9–52)
ANION GAP SERPL CALCULATED.3IONS-SCNC: 11 MMOL/L (ref 5–15)
AST SERPL-CCNC: 31 U/L (ref 14–36)
BILIRUB SERPL-MCNC: 0.3 MG/DL (ref 0.2–1.3)
BUN BLD-MCNC: 18 MG/DL (ref 7–21)
BUN/CREAT SERPL: 20.7 (ref 7–25)
CALCIUM SPEC-SCNC: 10 MG/DL (ref 8.4–10.2)
CHLORIDE SERPL-SCNC: 100 MMOL/L (ref 95–110)
CO2 SERPL-SCNC: 25 MMOL/L (ref 22–31)
CREAT BLD-MCNC: 0.87 MG/DL (ref 0.5–1)
GFR SERPL CREATININE-BSD FRML MDRD: 65 ML/MIN/1.73 (ref 45–104)
GLOBULIN UR ELPH-MCNC: 3.2 GM/DL (ref 2.3–3.5)
GLUCOSE BLD-MCNC: 134 MG/DL (ref 60–100)
MAGNESIUM SERPL-MCNC: 1.6 MG/DL (ref 1.6–2.3)
PHOSPHATE SERPL-MCNC: 3.4 MG/DL (ref 2.4–4.4)
POTASSIUM BLD-SCNC: 3.9 MMOL/L (ref 3.5–5.1)
PROT SERPL-MCNC: 7.3 G/DL (ref 6.3–8.6)
SODIUM BLD-SCNC: 136 MMOL/L (ref 137–145)

## 2018-10-23 PROCEDURE — 80053 COMPREHEN METABOLIC PANEL: CPT | Performed by: NURSE PRACTITIONER

## 2018-10-23 PROCEDURE — 83735 ASSAY OF MAGNESIUM: CPT | Performed by: NURSE PRACTITIONER

## 2018-10-23 PROCEDURE — 25010000002 DENOSUMAB 60 MG/ML SOLUTION: Performed by: INTERNAL MEDICINE

## 2018-10-23 PROCEDURE — 84100 ASSAY OF PHOSPHORUS: CPT | Performed by: NURSE PRACTITIONER

## 2018-10-23 PROCEDURE — 96372 THER/PROPH/DIAG INJ SC/IM: CPT | Performed by: NURSE PRACTITIONER

## 2018-10-23 RX ADMIN — DENOSUMAB 60 MG: 60 INJECTION SUBCUTANEOUS at 15:53

## 2018-12-04 ENCOUNTER — LAB (OUTPATIENT)
Dept: LAB | Facility: HOSPITAL | Age: 63
End: 2018-12-04

## 2018-12-04 DIAGNOSIS — E11.9 CONTROLLED TYPE 2 DIABETES MELLITUS WITHOUT COMPLICATION, WITHOUT LONG-TERM CURRENT USE OF INSULIN (HCC): ICD-10-CM

## 2018-12-04 DIAGNOSIS — E55.9 VITAMIN D DEFICIENCY: ICD-10-CM

## 2018-12-04 DIAGNOSIS — E04.0 SIMPLE GOITER: ICD-10-CM

## 2018-12-04 DIAGNOSIS — M80.00XS AGE-RELATED OSTEOPOROSIS WITH CURRENT PATHOLOGICAL FRACTURE, SEQUELA: ICD-10-CM

## 2018-12-04 LAB
25(OH)D3 SERPL-MCNC: 74.4 NG/ML (ref 30–100)
ALBUMIN SERPL-MCNC: 3.8 G/DL (ref 3.4–4.8)
ANION GAP SERPL CALCULATED.3IONS-SCNC: 10 MMOL/L (ref 5–15)
BASOPHILS # BLD AUTO: 0.03 10*3/MM3 (ref 0–0.2)
BASOPHILS NFR BLD AUTO: 0.5 % (ref 0–2)
BUN BLD-MCNC: 22 MG/DL (ref 7–21)
BUN/CREAT SERPL: 22.2 (ref 7–25)
CALCIUM SPEC-SCNC: 9.6 MG/DL (ref 8.4–10.2)
CHLORIDE SERPL-SCNC: 102 MMOL/L (ref 95–110)
CO2 SERPL-SCNC: 25 MMOL/L (ref 22–31)
CREAT BLD-MCNC: 0.99 MG/DL (ref 0.5–1)
DEPRECATED RDW RBC AUTO: 43.2 FL (ref 36.4–46.3)
EOSINOPHIL # BLD AUTO: 0.13 10*3/MM3 (ref 0–0.7)
EOSINOPHIL NFR BLD AUTO: 2.2 % (ref 0–7)
ERYTHROCYTE [DISTWIDTH] IN BLOOD BY AUTOMATED COUNT: 12.7 % (ref 11.5–14.5)
GFR SERPL CREATININE-BSD FRML MDRD: 56 ML/MIN/1.73 (ref 45–104)
GLUCOSE BLD-MCNC: 120 MG/DL (ref 60–100)
HBA1C MFR BLD: 7.2 % (ref 4–5.6)
HCT VFR BLD AUTO: 35.9 % (ref 35–45)
HGB BLD-MCNC: 11.9 G/DL (ref 12–15.5)
IMM GRANULOCYTES # BLD: 0.01 10*3/MM3 (ref 0–0.02)
IMM GRANULOCYTES NFR BLD: 0.2 % (ref 0–0.5)
LYMPHOCYTES # BLD AUTO: 1.6 10*3/MM3 (ref 0.6–4.2)
LYMPHOCYTES NFR BLD AUTO: 26.8 % (ref 10–50)
MCH RBC QN AUTO: 31.2 PG (ref 26.5–34)
MCHC RBC AUTO-ENTMCNC: 33.1 G/DL (ref 31.4–36)
MCV RBC AUTO: 94 FL (ref 80–98)
MONOCYTES # BLD AUTO: 0.4 10*3/MM3 (ref 0–0.9)
MONOCYTES NFR BLD AUTO: 6.7 % (ref 0–12)
NEUTROPHILS # BLD AUTO: 3.8 10*3/MM3 (ref 2–8.6)
NEUTROPHILS NFR BLD AUTO: 63.6 % (ref 37–80)
PHOSPHATE SERPL-MCNC: 4 MG/DL (ref 2.4–4.4)
PLATELET # BLD AUTO: 295 10*3/MM3 (ref 150–450)
PMV BLD AUTO: 11.8 FL (ref 8–12)
POTASSIUM BLD-SCNC: 4.1 MMOL/L (ref 3.5–5.1)
RBC # BLD AUTO: 3.82 10*6/MM3 (ref 3.77–5.16)
SODIUM BLD-SCNC: 137 MMOL/L (ref 137–145)
TSH SERPL DL<=0.05 MIU/L-ACNC: 2.51 MIU/ML (ref 0.46–4.68)
WBC NRBC COR # BLD: 5.97 10*3/MM3 (ref 3.2–9.8)

## 2018-12-04 PROCEDURE — 82306 VITAMIN D 25 HYDROXY: CPT

## 2018-12-04 PROCEDURE — 84443 ASSAY THYROID STIM HORMONE: CPT

## 2018-12-04 PROCEDURE — 83970 ASSAY OF PARATHORMONE: CPT

## 2018-12-04 PROCEDURE — 82310 ASSAY OF CALCIUM: CPT

## 2018-12-04 PROCEDURE — 86376 MICROSOMAL ANTIBODY EACH: CPT

## 2018-12-04 PROCEDURE — 85025 COMPLETE CBC W/AUTO DIFF WBC: CPT

## 2018-12-04 PROCEDURE — 80069 RENAL FUNCTION PANEL: CPT

## 2018-12-04 PROCEDURE — 83036 HEMOGLOBIN GLYCOSYLATED A1C: CPT

## 2018-12-05 LAB
CALCIUM SPEC-SCNC: 9.5 MG/DL (ref 8.4–10.2)
PTH-INTACT SERPL-MCNC: 15.2 PG/ML (ref 10–65)
THYROPEROXIDASE AB SERPL-ACNC: 7 IU/ML (ref 0–34)

## 2018-12-07 ENCOUNTER — OFFICE VISIT (OUTPATIENT)
Dept: ENDOCRINOLOGY | Facility: CLINIC | Age: 63
End: 2018-12-07

## 2018-12-07 VITALS
SYSTOLIC BLOOD PRESSURE: 128 MMHG | HEART RATE: 92 BPM | DIASTOLIC BLOOD PRESSURE: 76 MMHG | OXYGEN SATURATION: 96 % | HEIGHT: 69 IN | WEIGHT: 214.5 LBS | BODY MASS INDEX: 31.77 KG/M2

## 2018-12-07 DIAGNOSIS — E11.9 TYPE 2 DIABETES MELLITUS WITHOUT COMPLICATION, WITHOUT LONG-TERM CURRENT USE OF INSULIN (HCC): Primary | ICD-10-CM

## 2018-12-07 DIAGNOSIS — E55.9 VITAMIN D DEFICIENCY: ICD-10-CM

## 2018-12-07 DIAGNOSIS — M81.6 LOCALIZED OSTEOPOROSIS WITHOUT CURRENT PATHOLOGICAL FRACTURE: ICD-10-CM

## 2018-12-07 DIAGNOSIS — E04.9 GOITER: ICD-10-CM

## 2018-12-07 PROCEDURE — 99214 OFFICE O/P EST MOD 30 MIN: CPT | Performed by: INTERNAL MEDICINE

## 2018-12-07 NOTE — PROGRESS NOTES
Rekha Dorado is a 63 y.o. female who presents for  evaluation of   Chief Complaint   Patient presents with   • Diabetes       Referring provider    No referring provider defined for this encounter.    Primary Care Provider    Mariam Morgan MD    Very pleasant 63-year-old female sp right superior parathyroidectomy in April 2018 for primary hyperparathyroidism    Complications, she has severe osteoporosis complicated by  wrist fracture.    She also has history of nephrolithiasis.    Quantity, calcium has been as high as 12.8, now nl     Lab Results   Component Value Date    PTH 15.2 12/04/2018    CALCIUM 9.6 12/04/2018    CALCIUM 9.5 12/04/2018    PHOS 4.0 12/04/2018     Lab Results   Component Value Date    CALCIUM 9.6 12/04/2018    CALCIUM 9.5 12/04/2018    CALCIUM 10.0 10/23/2018    CALCIUM 10.2 06/04/2018    CALCIUM 10.2 06/04/2018    CALCIUM 10.6 (H) 04/11/2018    CALCIUM 11.5 (H) 03/30/2018    CALCIUM 12.8 (H) 01/23/2018    CALCIUM 12.9 (H) 01/23/2018    CALCIUM 11.1 (H) 12/19/2017    CALCIUM 11.5 (H) 12/19/2017         Alleviating factors she is taking Boniva for osteoporosis as well as vitamin D 2000 units daily.    ----------    She also has type 2 diabetes presently on metformin monotherapy with elevated fasting glucose    Past Medical History:   Diagnosis Date   • Acid reflux    • Arthritis    • Asthma    • Controlled type 2 diabetes mellitus without complication, without long-term current use of insulin (CMS/[facility]) 12/19/2017   • High blood triglycerides    • Hypertension    • Left thyroid nodule 12/19/2017   • Primary hyperparathyroidism (CMS/[facility]) 12/19/2017   • Severe osteopetrosis 12/19/2017   • Skin cancer    • Sleep apnea      Family History   Problem Relation Age of Onset   • Diabetes Mother      Social History     Tobacco Use   • Smoking status: Never Smoker   • Smokeless tobacco: Never Used   Substance Use Topics   • Alcohol use: No   • Drug use: No         Current Outpatient  Medications:   •  aspirin 81 MG EC tablet, Take 81 mg by mouth Every Night., Disp: , Rfl:   •  budesonide-formoterol (SYMBICORT) 160-4.5 MCG/ACT inhaler, Inhale 2 puffs 2 (Two) Times a Day., Disp: , Rfl:   •  carvedilol (COREG) 3.125 MG tablet, Take 3.125 mg by mouth 2 (Two) Times a Day With Meals., Disp: , Rfl:   •  cetirizine (zyrTEC) 10 MG tablet, Take 10 mg by mouth Every Night., Disp: , Rfl:   •  Cholecalciferol (VITAMIN D) 2000 units tablet, Take 2,000 Units by mouth Daily., Disp: , Rfl:   •  choline fenofibrate (TRILIPIX) 135 MG capsule, Take 135 mg by mouth Every Night., Disp: , Rfl:   •  CRANBERRY EXTRACT PO, Take  by mouth 2 (Two) Times a Day. 2 tabs, Disp: , Rfl:   •  dicyclomine (BENTYL) 10 MG capsule, Take 10 mg by mouth 3 (Three) Times a Day As Needed., Disp: , Rfl:   •  glucosamine sulfate 500 MG capsule capsule, Take  by mouth 2 (Two) Times a Day. 1 tab in the morning and 2 tabs at night, Disp: , Rfl:   •  KRILL OIL PO, Take  by mouth Every Night., Disp: , Rfl:   •  losartan (COZAAR) 100 MG tablet, Take 100 mg by mouth Daily., Disp: , Rfl:   •  montelukast (SINGULAIR) 10 MG tablet, Take 10 mg by mouth Every Night., Disp: , Rfl:   •  olopatadine (PATADAY) 0.2 % solution ophthalmic solution, Daily As Needed., Disp: , Rfl:   •  omeprazole (priLOSEC) 20 MG capsule, Take 20 mg by mouth Every Night., Disp: , Rfl:   •  SITagliptin-MetFORMIN HCl ER (JANUMET XR) 100-1000 MG tablet, Take 1 tablet by mouth Daily., Disp: 30 tablet, Rfl: 11    Review of Systems    Review of Systems   Constitutional: Negative for activity change, appetite change, chills, diaphoresis, fatigue, fever and unexpected weight change.   HENT: Negative for congestion, dental problem, drooling, ear discharge, ear pain, facial swelling, mouth sores, postnasal drip, rhinorrhea, sinus pressure, sore throat, tinnitus, trouble swallowing and voice change.    Eyes: Negative for photophobia, pain, discharge, redness, itching and visual  "disturbance.   Respiratory: Negative for apnea, cough, choking, chest tightness, shortness of breath, wheezing and stridor.    Cardiovascular: Negative for chest pain, palpitations and leg swelling.   Gastrointestinal: Negative for abdominal distention, abdominal pain, constipation, diarrhea, nausea and vomiting.   Endocrine: Negative for cold intolerance, heat intolerance, polydipsia, polyphagia and polyuria.   Genitourinary: Negative for decreased urine volume, difficulty urinating, dysuria, flank pain, frequency, hematuria and urgency.   Musculoskeletal: Negative for arthralgias, back pain, gait problem, joint swelling, myalgias, neck pain and neck stiffness.   Skin: Negative for color change, pallor, rash and wound.   Allergic/Immunologic: Negative for immunocompromised state.   Neurological: Negative for dizziness, tremors, seizures, syncope, facial asymmetry, speech difficulty, weakness, light-headedness, numbness and headaches.   Hematological: Negative for adenopathy.   Psychiatric/Behavioral: Negative for agitation, behavioral problems, confusion, decreased concentration, dysphoric mood, hallucinations, self-injury, sleep disturbance and suicidal ideas. The patient is not nervous/anxious and is not hyperactive.         Objective:   /76   Pulse 92   Ht 175.3 cm (69\")   Wt 97.3 kg (214 lb 8 oz)   SpO2 96%   BMI 31.68 kg/m²     Physical Exam   Constitutional: She is oriented to person, place, and time. She appears well-developed.   HENT:   Head: Normocephalic.   Right Ear: External ear normal.   Left Ear: External ear normal.   Nose: Nose normal.   Eyes: Conjunctivae and EOM are normal. No scleral icterus.   Neck: Normal range of motion. Neck supple. No tracheal deviation present. Thyromegaly present.   Cardiovascular: Normal rate, regular rhythm, normal heart sounds and intact distal pulses. Exam reveals no gallop and no friction rub.   No murmur heard.  Pulmonary/Chest: Effort normal and breath " sounds normal. No stridor. No respiratory distress. She has no wheezes. She has no rales. She exhibits no tenderness.   Abdominal: Soft. Bowel sounds are normal. She exhibits no distension and no mass. There is no tenderness. There is no rebound and no guarding.   Musculoskeletal: Normal range of motion. She exhibits no tenderness or deformity.   Lymphadenopathy:     She has no cervical adenopathy.   Neurological: She is alert and oriented to person, place, and time. She displays normal reflexes. She exhibits normal muscle tone. Coordination normal.   Skin: No rash noted. No erythema. No pallor.   Psychiatric: She has a normal mood and affect. Her behavior is normal. Judgment and thought content normal.       Lab Review    Results for orders placed or performed in visit on 12/04/18   Renal Function Panel   Result Value Ref Range    Glucose 120 (H) 60 - 100 mg/dL    BUN 22 (H) 7 - 21 mg/dL    Creatinine 0.99 0.50 - 1.00 mg/dL    Sodium 137 137 - 145 mmol/L    Potassium 4.1 3.5 - 5.1 mmol/L    Chloride 102 95 - 110 mmol/L    CO2 25.0 22.0 - 31.0 mmol/L    Calcium 9.6 8.4 - 10.2 mg/dL    Albumin 3.80 3.40 - 4.80 g/dL    Phosphorus 4.0 2.4 - 4.4 mg/dL    Anion Gap 10.0 5.0 - 15.0 mmol/L    BUN/Creatinine Ratio 22.2 7.0 - 25.0    eGFR Non  Amer 56 45 - 104 mL/min/1.73   Vitamin D 25 Hydroxy   Result Value Ref Range    25 Hydroxy, Vitamin D 74.4 30.0 - 100.0 ng/ml   TSH   Result Value Ref Range    TSH 2.510 0.460 - 4.680 mIU/mL   Thyroid Peroxidase Antibody   Result Value Ref Range    Thyroid Peroxidase Antibody 7 0 - 34 IU/mL   Hemoglobin A1c   Result Value Ref Range    Hemoglobin A1C 7.2 (H) 4 - 5.6 %   PTH, Intact & Calcium   Result Value Ref Range    PTH, Intact 15.2 10.0 - 65.0 pg/mL    Calcium 9.5 8.4 - 10.2 mg/dL   CBC Auto Differential   Result Value Ref Range    WBC 5.97 3.20 - 9.80 10*3/mm3    RBC 3.82 3.77 - 5.16 10*6/mm3    Hemoglobin 11.9 (L) 12.0 - 15.5 g/dL    Hematocrit 35.9 35.0 - 45.0 %    MCV  94.0 80.0 - 98.0 fL    MCH 31.2 26.5 - 34.0 pg    MCHC 33.1 31.4 - 36.0 g/dL    RDW 12.7 11.5 - 14.5 %    RDW-SD 43.2 36.4 - 46.3 fl    MPV 11.8 8.0 - 12.0 fL    Platelets 295 150 - 450 10*3/mm3    Neutrophil % 63.6 37.0 - 80.0 %    Lymphocyte % 26.8 10.0 - 50.0 %    Monocyte % 6.7 0.0 - 12.0 %    Eosinophil % 2.2 0.0 - 7.0 %    Basophil % 0.5 0.0 - 2.0 %    Immature Grans % 0.2 0.0 - 0.5 %    Neutrophils, Absolute 3.80 2.00 - 8.60 10*3/mm3    Lymphocytes, Absolute 1.60 0.60 - 4.20 10*3/mm3    Monocytes, Absolute 0.40 0.00 - 0.90 10*3/mm3    Eosinophils, Absolute 0.13 0.00 - 0.70 10*3/mm3    Basophils, Absolute 0.03 0.00 - 0.20 10*3/mm3    Immature Grans, Absolute 0.01 0.00 - 0.02 10*3/mm3           Assessment/Plan       ICD-10-CM ICD-9-CM   1. Controlled type 2 diabetes mellitus without complication, without long-term current use of insulin (CMS/[facility]) E11.9 250.00   2. Age-related osteoporosis with current pathological fracture, sequela M80.00XS 905.5     733.01   3. Vitamin D deficiency E55.9 268.9   4. Primary hyperparathyroidism (CMS/[facility]) E21.0 252.01   5. Left thyroid nodule E04.1 241.0   6. Localized osteoporosis without current pathological fracture M81.6 733.09         Diabetes, on Janumet 100/1000 mg extended release one tablet with breakfast.    Lab Results   Component Value Date    HGBA1C 7.2 (H) 12/04/2018       ===========    Primary hyperparathyroidism - cured by right sup parathyroidectomy in Feb 2018    2000 units of vitamin D and calcium 400 to 600 mg bid, change to qd if eating dietary calcium     Now on prolia     Lab Results   Component Value Date    CALCIUM 9.6 12/04/2018    CALCIUM 9.5 12/04/2018    CALCIUM 10.0 10/23/2018     Lab Results   Component Value Date    PTH 15.2 12/04/2018    CALCIUM 9.6 12/04/2018    CALCIUM 9.5 12/04/2018    PHOS 4.0 12/04/2018         --    Diffuse goiter, nl TSH    No need for repeat US , just follow TSH     Lab Results   Component Value Date    TSH 2.510  12/04/2018    TSH 2.040 06/04/2018    TSH 1.220 12/19/2017       Thyroid Antibodies  TPO AB  Lab Results   Component Value Date    THYROIDAB 7 12/04/2018          A copy of my note was sent to Mariam Morgan MD    Please see my above opinion and suggestions.

## 2019-01-08 DIAGNOSIS — E11.9 CONTROLLED TYPE 2 DIABETES MELLITUS WITHOUT COMPLICATION, WITHOUT LONG-TERM CURRENT USE OF INSULIN (HCC): ICD-10-CM

## 2019-04-08 ENCOUNTER — TREATMENT (OUTPATIENT)
Dept: ENDOCRINOLOGY | Facility: CLINIC | Age: 64
End: 2019-04-08

## 2019-04-08 DIAGNOSIS — M80.00XS AGE-RELATED OSTEOPOROSIS WITH CURRENT PATHOLOGICAL FRACTURE, SEQUELA: Primary | ICD-10-CM

## 2019-04-23 ENCOUNTER — APPOINTMENT (OUTPATIENT)
Dept: ONCOLOGY | Facility: HOSPITAL | Age: 64
End: 2019-04-23

## 2019-04-25 ENCOUNTER — APPOINTMENT (OUTPATIENT)
Dept: LAB | Facility: HOSPITAL | Age: 64
End: 2019-04-25

## 2019-04-25 LAB
BASOPHILS # BLD AUTO: 0.01 10*3/MM3 (ref 0–0.2)
BASOPHILS NFR BLD AUTO: 0.3 % (ref 0–1.5)
DEPRECATED RDW RBC AUTO: 48 FL (ref 37–54)
EOSINOPHIL # BLD AUTO: 0.27 10*3/MM3 (ref 0–0.4)
EOSINOPHIL NFR BLD AUTO: 8.5 % (ref 0.3–6.2)
ERYTHROCYTE [DISTWIDTH] IN BLOOD BY AUTOMATED COUNT: 13.3 % (ref 12.3–15.4)
HBA1C MFR BLD: 6.1 % (ref 4.8–5.6)
HCT VFR BLD AUTO: 35 % (ref 34–46.6)
HGB BLD-MCNC: 11 G/DL (ref 12–15.9)
IMM GRANULOCYTES # BLD AUTO: 0 10*3/MM3 (ref 0–0.05)
IMM GRANULOCYTES NFR BLD AUTO: 0 % (ref 0–0.5)
LYMPHOCYTES # BLD AUTO: 0.78 10*3/MM3 (ref 0.7–3.1)
LYMPHOCYTES NFR BLD AUTO: 24.5 % (ref 19.6–45.3)
MCH RBC QN AUTO: 30.6 PG (ref 26.6–33)
MCHC RBC AUTO-ENTMCNC: 31.4 G/DL (ref 31.5–35.7)
MCV RBC AUTO: 97.5 FL (ref 79–97)
MONOCYTES # BLD AUTO: 0.29 10*3/MM3 (ref 0.1–0.9)
MONOCYTES NFR BLD AUTO: 9.1 % (ref 5–12)
NEUTROPHILS # BLD AUTO: 1.84 10*3/MM3 (ref 1.7–7)
NEUTROPHILS NFR BLD AUTO: 57.6 % (ref 42.7–76)
NRBC BLD AUTO-RTO: 0 /100 WBC (ref 0–0.2)
PLATELET # BLD AUTO: 213 10*3/MM3 (ref 140–450)
PMV BLD AUTO: 12 FL (ref 6–12)
RBC # BLD AUTO: 3.59 10*6/MM3 (ref 3.77–5.28)
WBC NRBC COR # BLD: 3.19 10*3/MM3 (ref 3.4–10.8)

## 2019-04-25 PROCEDURE — 82306 VITAMIN D 25 HYDROXY: CPT | Performed by: INTERNAL MEDICINE

## 2019-04-25 PROCEDURE — 80061 LIPID PANEL: CPT | Performed by: INTERNAL MEDICINE

## 2019-04-25 PROCEDURE — 82607 VITAMIN B-12: CPT | Performed by: INTERNAL MEDICINE

## 2019-04-25 PROCEDURE — 84443 ASSAY THYROID STIM HORMONE: CPT | Performed by: INTERNAL MEDICINE

## 2019-04-25 PROCEDURE — 80053 COMPREHEN METABOLIC PANEL: CPT | Performed by: INTERNAL MEDICINE

## 2019-04-25 PROCEDURE — 85025 COMPLETE CBC W/AUTO DIFF WBC: CPT | Performed by: INTERNAL MEDICINE

## 2019-04-25 PROCEDURE — 83036 HEMOGLOBIN GLYCOSYLATED A1C: CPT | Performed by: INTERNAL MEDICINE

## 2019-04-26 ENCOUNTER — INFUSION (OUTPATIENT)
Dept: ONCOLOGY | Facility: HOSPITAL | Age: 64
End: 2019-04-26

## 2019-04-26 DIAGNOSIS — M81.6 LOCALIZED OSTEOPOROSIS WITHOUT CURRENT PATHOLOGICAL FRACTURE: ICD-10-CM

## 2019-04-26 DIAGNOSIS — E21.0 PRIMARY HYPERPARATHYROIDISM (HCC): ICD-10-CM

## 2019-04-26 DIAGNOSIS — Q78.2 SEVERE OSTEOPETROSIS: Primary | ICD-10-CM

## 2019-04-26 LAB
CALCIUM SPEC-SCNC: 9.7 MG/DL (ref 8.6–10.5)
MAGNESIUM SERPL-MCNC: 1.5 MG/DL (ref 1.6–2.4)
PHOSPHATE SERPL-MCNC: 3.5 MG/DL (ref 2.5–4.5)

## 2019-04-26 PROCEDURE — 84100 ASSAY OF PHOSPHORUS: CPT | Performed by: NURSE PRACTITIONER

## 2019-04-26 PROCEDURE — 82310 ASSAY OF CALCIUM: CPT | Performed by: NURSE PRACTITIONER

## 2019-04-26 PROCEDURE — 96372 THER/PROPH/DIAG INJ SC/IM: CPT | Performed by: NURSE PRACTITIONER

## 2019-04-26 PROCEDURE — 25010000002 DENOSUMAB 60 MG/ML SOLUTION: Performed by: INTERNAL MEDICINE

## 2019-04-26 PROCEDURE — 83735 ASSAY OF MAGNESIUM: CPT | Performed by: NURSE PRACTITIONER

## 2019-04-26 RX ADMIN — DENOSUMAB 60 MG: 60 INJECTION SUBCUTANEOUS at 09:57

## 2019-04-27 LAB
25(OH)D3 SERPL-MCNC: 48.2 NG/ML (ref 30–100)
ALBUMIN SERPL-MCNC: 3.8 G/DL (ref 3.5–5.2)
ALBUMIN/GLOB SERPL: 1.4 G/DL
ALP SERPL-CCNC: 43 U/L (ref 39–117)
ALT SERPL W P-5'-P-CCNC: 25 U/L (ref 1–33)
ANION GAP SERPL CALCULATED.3IONS-SCNC: 11.2 MMOL/L
AST SERPL-CCNC: 26 U/L (ref 1–32)
BILIRUB SERPL-MCNC: 0.3 MG/DL (ref 0.2–1.2)
BUN BLD-MCNC: 18 MG/DL (ref 8–23)
BUN/CREAT SERPL: 16.7 (ref 7–25)
CALCIUM SPEC-SCNC: 9.6 MG/DL (ref 8.6–10.5)
CHLORIDE SERPL-SCNC: 103 MMOL/L (ref 98–107)
CHOLEST SERPL-MCNC: 122 MG/DL (ref 0–200)
CO2 SERPL-SCNC: 23.8 MMOL/L (ref 22–29)
CREAT BLD-MCNC: 1.08 MG/DL (ref 0.57–1)
GFR SERPL CREATININE-BSD FRML MDRD: 51 ML/MIN/1.73
GLOBULIN UR ELPH-MCNC: 2.8 GM/DL
GLUCOSE BLD-MCNC: 123 MG/DL (ref 65–99)
HDLC SERPL-MCNC: 28 MG/DL (ref 40–60)
LDLC SERPL CALC-MCNC: 55 MG/DL (ref 0–100)
LDLC/HDLC SERPL: 1.97 {RATIO}
POTASSIUM BLD-SCNC: 3.9 MMOL/L (ref 3.5–5.2)
PROT SERPL-MCNC: 6.6 G/DL (ref 6–8.5)
SODIUM BLD-SCNC: 138 MMOL/L (ref 136–145)
TRIGL SERPL-MCNC: 194 MG/DL (ref 0–150)
TSH SERPL DL<=0.05 MIU/L-ACNC: 1.71 MIU/ML (ref 0.27–4.2)
VIT B12 BLD-MCNC: 182 PG/ML (ref 211–946)
VLDLC SERPL-MCNC: 38.8 MG/DL (ref 5–40)

## 2019-06-12 ENCOUNTER — OFFICE VISIT (OUTPATIENT)
Dept: ENDOCRINOLOGY | Facility: CLINIC | Age: 64
End: 2019-06-12

## 2019-06-12 VITALS
HEART RATE: 90 BPM | WEIGHT: 200.5 LBS | BODY MASS INDEX: 29.7 KG/M2 | DIASTOLIC BLOOD PRESSURE: 74 MMHG | OXYGEN SATURATION: 98 % | HEIGHT: 69 IN | SYSTOLIC BLOOD PRESSURE: 124 MMHG

## 2019-06-12 DIAGNOSIS — E55.9 VITAMIN D DEFICIENCY: ICD-10-CM

## 2019-06-12 DIAGNOSIS — E04.1 LEFT THYROID NODULE: ICD-10-CM

## 2019-06-12 DIAGNOSIS — E21.0 PRIMARY HYPERPARATHYROIDISM (HCC): ICD-10-CM

## 2019-06-12 DIAGNOSIS — E11.9 CONTROLLED TYPE 2 DIABETES MELLITUS WITHOUT COMPLICATION, WITHOUT LONG-TERM CURRENT USE OF INSULIN (HCC): Primary | ICD-10-CM

## 2019-06-12 LAB
GLUCOSE BLDC GLUCOMTR-MCNC: 98 MG/DL (ref 70–130)
HBA1C MFR BLD: 5.9 %

## 2019-06-12 PROCEDURE — 99214 OFFICE O/P EST MOD 30 MIN: CPT | Performed by: INTERNAL MEDICINE

## 2019-06-12 PROCEDURE — 82962 GLUCOSE BLOOD TEST: CPT | Performed by: INTERNAL MEDICINE

## 2019-06-12 RX ORDER — CYANOCOBALAMIN 1000 UG/ML
1000 INJECTION, SOLUTION INTRAMUSCULAR; SUBCUTANEOUS
Qty: 1 ML | Refills: 11 | Status: SHIPPED | OUTPATIENT
Start: 2019-06-12 | End: 2019-06-17

## 2019-06-12 RX ORDER — CIPROFLOXACIN 250 MG/1
250 TABLET, FILM COATED ORAL 2 TIMES DAILY
COMMUNITY
End: 2020-12-11

## 2019-06-12 RX ORDER — ADHESIVE TAPE 3"X 2.3 YD
TAPE, NON-MEDICATED TOPICAL
Qty: 30 TABLET | Refills: 11 | Status: SHIPPED | OUTPATIENT
Start: 2019-06-12 | End: 2019-06-17 | Stop reason: SDUPTHER

## 2019-06-12 RX ORDER — GREEN TEA/HOODIA GORDONII 315-12.5MG
CAPSULE ORAL
Qty: 15 TABLET | Refills: 11
Start: 2019-06-12 | End: 2019-06-17

## 2019-06-12 NOTE — PROGRESS NOTES
Rekha Dorado is a 64 y.o. female who presents for  evaluation of   Chief Complaint   Patient presents with   • Follow-up       Referring provider    No referring provider defined for this encounter.    Primary Care Provider    Mariam Morgan MD    Very pleasant 64-year-old female sp right superior parathyroidectomy in April 2018 for primary hyperparathyroidism    Complications, she has severe osteoporosis complicated by  wrist fracture.    She also has history of nephrolithiasis. In April 2019 had lithotripsy but no new stones     Quantity, calcium has been as high as 12.8, now nl     Lab Results   Component Value Date    PTH 15.2 12/04/2018    CALCIUM 9.7 04/26/2019    PHOS 3.5 04/26/2019     Lab Results   Component Value Date    CALCIUM 9.7 04/26/2019    CALCIUM 9.6 04/25/2019    CALCIUM 9.6 12/04/2018    CALCIUM 9.5 12/04/2018    CALCIUM 10.0 10/23/2018    CALCIUM 10.2 06/04/2018    CALCIUM 10.2 06/04/2018    CALCIUM 10.6 (H) 04/11/2018    CALCIUM 11.5 (H) 03/30/2018    CALCIUM 12.8 (H) 01/23/2018    CALCIUM 12.9 (H) 01/23/2018         Alleviating factors she is taking Boniva for osteoporosis as well as vitamin D 2000 units daily.    ----------    She also has type 2 diabetes presently on metformin monotherapy with elevated fasting glucose    Past Medical History:   Diagnosis Date   • Acid reflux    • Arthritis    • Asthma    • Controlled type 2 diabetes mellitus without complication, without long-term current use of insulin (CMS/[facility]) 12/19/2017   • High blood triglycerides    • Hypertension    • Left thyroid nodule 12/19/2017   • Primary hyperparathyroidism (CMS/[facility]) 12/19/2017   • Severe osteopetrosis 12/19/2017   • Skin cancer    • Sleep apnea      Family History   Problem Relation Age of Onset   • Diabetes Mother      Social History     Tobacco Use   • Smoking status: Never Smoker   • Smokeless tobacco: Never Used   Substance Use Topics   • Alcohol use: No   • Drug use: No         Current  Outpatient Medications:   •  aspirin 81 MG EC tablet, Take 81 mg by mouth Every Night., Disp: , Rfl:   •  budesonide-formoterol (SYMBICORT) 160-4.5 MCG/ACT inhaler, Inhale 2 puffs 2 (Two) Times a Day., Disp: , Rfl:   •  carvedilol (COREG) 3.125 MG tablet, Take 3.125 mg by mouth 2 (Two) Times a Day With Meals., Disp: , Rfl:   •  cetirizine (zyrTEC) 10 MG tablet, Take 10 mg by mouth Every Night., Disp: , Rfl:   •  Cholecalciferol (VITAMIN D) 2000 units tablet, Take 2,000 Units by mouth Daily., Disp: , Rfl:   •  choline fenofibrate (TRILIPIX) 135 MG capsule, Take 135 mg by mouth Every Night., Disp: , Rfl:   •  ciprofloxacin (CIPRO) 250 MG tablet, Take 250 mg by mouth 2 (Two) Times a Day., Disp: , Rfl:   •  CRANBERRY EXTRACT PO, Take  by mouth 2 (Two) Times a Day. 2 tabs, Disp: , Rfl:   •  dicyclomine (BENTYL) 10 MG capsule, Take 10 mg by mouth 3 (Three) Times a Day As Needed., Disp: , Rfl:   •  glucosamine sulfate 500 MG capsule capsule, Take  by mouth 2 (Two) Times a Day. 1 tab in the morning and 2 tabs at night, Disp: , Rfl:   •  KRILL OIL PO, Take  by mouth Every Night., Disp: , Rfl:   •  losartan (COZAAR) 100 MG tablet, Take 100 mg by mouth Daily., Disp: , Rfl:   •  metFORMIN (GLUCOPHAGE) 500 MG tablet, Take 500 mg by mouth Every Night., Disp: , Rfl:   •  montelukast (SINGULAIR) 10 MG tablet, Take 10 mg by mouth Every Night., Disp: , Rfl:   •  olopatadine (PATADAY) 0.2 % solution ophthalmic solution, Daily As Needed., Disp: , Rfl:   •  omeprazole (priLOSEC) 20 MG capsule, Take 20 mg by mouth Every Night., Disp: , Rfl:   •  SITagliptin-MetFORMIN HCl ER (JANUMET XR) 100-1000 MG tablet, Take 1 tablet by mouth Daily., Disp: 90 tablet, Rfl: 3    Review of Systems    Review of Systems   Constitutional: Negative for activity change, appetite change, chills, diaphoresis, fatigue, fever and unexpected weight change.   HENT: Negative for congestion, dental problem, drooling, ear discharge, ear pain, facial swelling, mouth  "sores, postnasal drip, rhinorrhea, sinus pressure, sore throat, tinnitus, trouble swallowing and voice change.    Eyes: Negative for photophobia, pain, discharge, redness, itching and visual disturbance.   Respiratory: Negative for apnea, cough, choking, chest tightness, shortness of breath, wheezing and stridor.    Cardiovascular: Negative for chest pain, palpitations and leg swelling.   Gastrointestinal: Negative for abdominal distention, abdominal pain, constipation, diarrhea, nausea and vomiting.   Endocrine: Negative for cold intolerance, heat intolerance, polydipsia, polyphagia and polyuria.   Genitourinary: Negative for decreased urine volume, difficulty urinating, dysuria, flank pain, frequency, hematuria and urgency.   Musculoskeletal: Negative for arthralgias, back pain, gait problem, joint swelling, myalgias, neck pain and neck stiffness.   Skin: Negative for color change, pallor, rash and wound.   Allergic/Immunologic: Negative for immunocompromised state.   Neurological: Negative for dizziness, tremors, seizures, syncope, facial asymmetry, speech difficulty, weakness, light-headedness, numbness and headaches.   Hematological: Negative for adenopathy.   Psychiatric/Behavioral: Negative for agitation, behavioral problems, confusion, decreased concentration, dysphoric mood, hallucinations, self-injury, sleep disturbance and suicidal ideas. The patient is not nervous/anxious and is not hyperactive.         Objective:   /74   Pulse 90   Ht 175.3 cm (69\")   Wt 90.9 kg (200 lb 8 oz)   SpO2 98%   BMI 29.61 kg/m²     Physical Exam   Constitutional: She is oriented to person, place, and time. She appears well-developed.   HENT:   Head: Normocephalic.   Right Ear: External ear normal.   Left Ear: External ear normal.   Nose: Nose normal.   Eyes: Conjunctivae and EOM are normal. No scleral icterus.   Neck: Normal range of motion. Neck supple. No tracheal deviation present. Thyromegaly present. "   Cardiovascular: Normal rate, regular rhythm, normal heart sounds and intact distal pulses. Exam reveals no gallop and no friction rub.   No murmur heard.  Pulmonary/Chest: Effort normal and breath sounds normal. No stridor. No respiratory distress. She has no wheezes. She has no rales. She exhibits no tenderness.   Abdominal: Soft. Bowel sounds are normal. She exhibits no distension and no mass. There is no tenderness. There is no rebound and no guarding.   Musculoskeletal: Normal range of motion. She exhibits no tenderness or deformity.   Lymphadenopathy:     She has no cervical adenopathy.   Neurological: She is alert and oriented to person, place, and time. She displays normal reflexes. She exhibits normal muscle tone. Coordination normal.   Skin: No rash noted. No erythema. No pallor.   Psychiatric: She has a normal mood and affect. Her behavior is normal. Judgment and thought content normal.       Lab Review    Results for orders placed or performed in visit on 06/12/19   POC Glucose   Result Value Ref Range    Glucose 98 70 - 130 mg/dL   POC Glycosylated Hemoglobin (Hb A1C)   Result Value Ref Range    Hemoglobin A1C 5.9 %           Assessment/Plan       ICD-10-CM ICD-9-CM   1. Controlled type 2 diabetes mellitus without complication, without long-term current use of insulin (CMS/[facility]) E11.9 250.00   2. Vitamin D deficiency E55.9 268.9   3. Primary hyperparathyroidism (CMS/[facility]) E21.0 252.01   4. Left thyroid nodule E04.1 241.0         Diabetes, on Janumet 100/1000 mg extended release one tablet with breakfast  Plus 500 mg extra of metformin     Keep Janumet only     Lab Results   Component Value Date    HGBA1C 5.9 06/12/2019       ===========    Primary hyperparathyroidism - cured by right sup parathyroidectomy in Feb 2018    2000 units of vitamin D and calcium 400 to 600 mg bid, change to qd if eating dietary calcium     Now on prolia     Lab Results   Component Value Date    CALCIUM 9.7 04/26/2019    CALCIUM  9.6 04/25/2019    CALCIUM 9.6 12/04/2018    CALCIUM 9.5 12/04/2018     Lab Results   Component Value Date    PTH 15.2 12/04/2018    CALCIUM 9.7 04/26/2019    PHOS 3.5 04/26/2019     12-18 last DXA with increased bone density     --    Diffuse goiter, nl TSH    No need for repeat US , just follow TSH     Lab Results   Component Value Date    TSH 1.710 04/25/2019    TSH 2.510 12/04/2018    TSH 2.040 06/04/2018       Thyroid Antibodies  TPO AB  Lab Results   Component Value Date    THYROIDAB 7 12/04/2018       ==    Anemia    Will have colonoscopy     ==    Low Magnesium     Start Mg Oxide 200 mg daily        A copy of my note was sent to Mariam Morgan MD    Please see my above opinion and suggestions.

## 2019-06-17 RX ORDER — CYANOCOBALAMIN 1000 UG/ML
1000 INJECTION, SOLUTION INTRAMUSCULAR; SUBCUTANEOUS
Qty: 1 ML | Refills: 3 | Status: SHIPPED | OUTPATIENT
Start: 2019-06-17 | End: 2020-03-11 | Stop reason: SDUPTHER

## 2019-06-17 RX ORDER — ADHESIVE TAPE 3"X 2.3 YD
TAPE, NON-MEDICATED TOPICAL
Qty: 30 TABLET | Refills: 11 | Status: SHIPPED | OUTPATIENT
Start: 2019-06-17 | End: 2020-03-11

## 2019-09-16 DIAGNOSIS — E11.9 CONTROLLED TYPE 2 DIABETES MELLITUS WITHOUT COMPLICATION, WITHOUT LONG-TERM CURRENT USE OF INSULIN (HCC): ICD-10-CM

## 2019-10-21 ENCOUNTER — APPOINTMENT (OUTPATIENT)
Dept: ONCOLOGY | Facility: HOSPITAL | Age: 64
End: 2019-10-21

## 2019-10-25 ENCOUNTER — APPOINTMENT (OUTPATIENT)
Dept: ONCOLOGY | Facility: HOSPITAL | Age: 64
End: 2019-10-25

## 2019-10-30 ENCOUNTER — INFUSION (OUTPATIENT)
Dept: ONCOLOGY | Facility: HOSPITAL | Age: 64
End: 2019-10-30

## 2019-10-30 ENCOUNTER — LAB (OUTPATIENT)
Dept: ONCOLOGY | Facility: HOSPITAL | Age: 64
End: 2019-10-30

## 2019-10-30 VITALS
TEMPERATURE: 97.3 F | SYSTOLIC BLOOD PRESSURE: 117 MMHG | RESPIRATION RATE: 16 BRPM | HEART RATE: 67 BPM | DIASTOLIC BLOOD PRESSURE: 56 MMHG

## 2019-10-30 DIAGNOSIS — M81.6 LOCALIZED OSTEOPOROSIS WITHOUT CURRENT PATHOLOGICAL FRACTURE: Primary | ICD-10-CM

## 2019-10-30 DIAGNOSIS — Q78.2 SEVERE OSTEOPETROSIS: Primary | ICD-10-CM

## 2019-10-30 DIAGNOSIS — E21.0 PRIMARY HYPERPARATHYROIDISM (HCC): ICD-10-CM

## 2019-10-30 LAB
CALCIUM SPEC-SCNC: 10 MG/DL (ref 8.6–10.5)
MAGNESIUM SERPL-MCNC: 1.8 MG/DL (ref 1.6–2.4)
PHOSPHATE SERPL-MCNC: 3.1 MG/DL (ref 2.5–4.5)

## 2019-10-30 PROCEDURE — 83735 ASSAY OF MAGNESIUM: CPT | Performed by: NURSE PRACTITIONER

## 2019-10-30 PROCEDURE — 36415 COLL VENOUS BLD VENIPUNCTURE: CPT | Performed by: NURSE PRACTITIONER

## 2019-10-30 PROCEDURE — 96372 THER/PROPH/DIAG INJ SC/IM: CPT | Performed by: NURSE PRACTITIONER

## 2019-10-30 PROCEDURE — 84100 ASSAY OF PHOSPHORUS: CPT | Performed by: NURSE PRACTITIONER

## 2019-10-30 PROCEDURE — 25010000002 DENOSUMAB 60 MG/ML SOLUTION PREFILLED SYRINGE: Performed by: INTERNAL MEDICINE

## 2019-10-30 PROCEDURE — 82310 ASSAY OF CALCIUM: CPT | Performed by: NURSE PRACTITIONER

## 2019-10-30 RX ADMIN — DENOSUMAB 60 MG: 60 INJECTION SUBCUTANEOUS at 15:10

## 2020-01-20 ENCOUNTER — TREATMENT (OUTPATIENT)
Dept: ENDOCRINOLOGY | Facility: CLINIC | Age: 65
End: 2020-01-20

## 2020-03-06 ENCOUNTER — APPOINTMENT (OUTPATIENT)
Dept: LAB | Facility: HOSPITAL | Age: 65
End: 2020-03-06

## 2020-03-06 LAB
25(OH)D3 SERPL-MCNC: 54.7 NG/ML (ref 30–100)
ALBUMIN SERPL-MCNC: 4.4 G/DL (ref 3.5–5.2)
ALBUMIN UR-MCNC: <1.2 MG/DL
ALBUMIN/GLOB SERPL: 1.7 G/DL
ALP SERPL-CCNC: 53 U/L (ref 39–117)
ALT SERPL W P-5'-P-CCNC: 20 U/L (ref 1–33)
ANION GAP SERPL CALCULATED.3IONS-SCNC: 14.4 MMOL/L (ref 5–15)
AST SERPL-CCNC: 19 U/L (ref 1–32)
BASOPHILS # BLD AUTO: 0.04 10*3/MM3 (ref 0–0.2)
BASOPHILS NFR BLD AUTO: 0.6 % (ref 0–1.5)
BILIRUB SERPL-MCNC: 0.3 MG/DL (ref 0.2–1.2)
BUN BLD-MCNC: 15 MG/DL (ref 8–23)
BUN/CREAT SERPL: 15 (ref 7–25)
CALCIUM SPEC-SCNC: 10.3 MG/DL (ref 8.6–10.5)
CHLORIDE SERPL-SCNC: 101 MMOL/L (ref 98–107)
CHOLEST SERPL-MCNC: 160 MG/DL (ref 0–200)
CO2 SERPL-SCNC: 23.6 MMOL/L (ref 22–29)
CREAT BLD-MCNC: 1 MG/DL (ref 0.57–1)
CREAT UR-MCNC: 65.6 MG/DL
DEPRECATED RDW RBC AUTO: 41.2 FL (ref 37–54)
EOSINOPHIL # BLD AUTO: 0.27 10*3/MM3 (ref 0–0.4)
EOSINOPHIL NFR BLD AUTO: 4 % (ref 0.3–6.2)
ERYTHROCYTE [DISTWIDTH] IN BLOOD BY AUTOMATED COUNT: 12.5 % (ref 12.3–15.4)
GFR SERPL CREATININE-BSD FRML MDRD: 55 ML/MIN/1.73
GLOBULIN UR ELPH-MCNC: 2.6 GM/DL
GLUCOSE BLD-MCNC: 114 MG/DL (ref 65–99)
HBA1C MFR BLD: 6.5 % (ref 4.8–5.6)
HCT VFR BLD AUTO: 36 % (ref 34–46.6)
HDLC SERPL-MCNC: 36 MG/DL (ref 40–60)
HGB BLD-MCNC: 12.3 G/DL (ref 12–15.9)
IMM GRANULOCYTES # BLD AUTO: 0.01 10*3/MM3 (ref 0–0.05)
IMM GRANULOCYTES NFR BLD AUTO: 0.1 % (ref 0–0.5)
LDLC SERPL CALC-MCNC: 91 MG/DL (ref 0–100)
LDLC/HDLC SERPL: 2.53 {RATIO}
LYMPHOCYTES # BLD AUTO: 1.62 10*3/MM3 (ref 0.7–3.1)
LYMPHOCYTES NFR BLD AUTO: 24.3 % (ref 19.6–45.3)
MAGNESIUM SERPL-MCNC: 1.8 MG/DL (ref 1.6–2.4)
MCH RBC QN AUTO: 31.3 PG (ref 26.6–33)
MCHC RBC AUTO-ENTMCNC: 34.2 G/DL (ref 31.5–35.7)
MCV RBC AUTO: 91.6 FL (ref 79–97)
MICROALBUMIN/CREAT UR: NORMAL MG/G{CREAT}
MONOCYTES # BLD AUTO: 0.43 10*3/MM3 (ref 0.1–0.9)
MONOCYTES NFR BLD AUTO: 6.4 % (ref 5–12)
NEUTROPHILS # BLD AUTO: 4.3 10*3/MM3 (ref 1.7–7)
NEUTROPHILS NFR BLD AUTO: 64.6 % (ref 42.7–76)
NRBC BLD AUTO-RTO: 0 /100 WBC (ref 0–0.2)
PLATELET # BLD AUTO: 252 10*3/MM3 (ref 140–450)
PMV BLD AUTO: 11.8 FL (ref 6–12)
POTASSIUM BLD-SCNC: 4.1 MMOL/L (ref 3.5–5.2)
PROT SERPL-MCNC: 7 G/DL (ref 6–8.5)
RBC # BLD AUTO: 3.93 10*6/MM3 (ref 3.77–5.28)
SODIUM BLD-SCNC: 139 MMOL/L (ref 136–145)
TRIGL SERPL-MCNC: 165 MG/DL (ref 0–150)
TSH SERPL DL<=0.05 MIU/L-ACNC: 2.4 UIU/ML (ref 0.27–4.2)
VIT B12 BLD-MCNC: 278 PG/ML (ref 211–946)
VLDLC SERPL-MCNC: 33 MG/DL (ref 5–40)
WBC NRBC COR # BLD: 6.67 10*3/MM3 (ref 3.4–10.8)

## 2020-03-06 PROCEDURE — 82306 VITAMIN D 25 HYDROXY: CPT | Performed by: INTERNAL MEDICINE

## 2020-03-06 PROCEDURE — 82607 VITAMIN B-12: CPT | Performed by: INTERNAL MEDICINE

## 2020-03-06 PROCEDURE — 82570 ASSAY OF URINE CREATININE: CPT | Performed by: INTERNAL MEDICINE

## 2020-03-06 PROCEDURE — 83735 ASSAY OF MAGNESIUM: CPT | Performed by: INTERNAL MEDICINE

## 2020-03-06 PROCEDURE — 85025 COMPLETE CBC W/AUTO DIFF WBC: CPT | Performed by: INTERNAL MEDICINE

## 2020-03-06 PROCEDURE — 82043 UR ALBUMIN QUANTITATIVE: CPT | Performed by: INTERNAL MEDICINE

## 2020-03-06 PROCEDURE — 83036 HEMOGLOBIN GLYCOSYLATED A1C: CPT | Performed by: INTERNAL MEDICINE

## 2020-03-06 PROCEDURE — 80061 LIPID PANEL: CPT | Performed by: INTERNAL MEDICINE

## 2020-03-06 PROCEDURE — 84443 ASSAY THYROID STIM HORMONE: CPT | Performed by: INTERNAL MEDICINE

## 2020-03-06 PROCEDURE — 80053 COMPREHEN METABOLIC PANEL: CPT | Performed by: INTERNAL MEDICINE

## 2020-03-11 ENCOUNTER — OFFICE VISIT (OUTPATIENT)
Dept: ENDOCRINOLOGY | Facility: CLINIC | Age: 65
End: 2020-03-11

## 2020-03-11 VITALS
WEIGHT: 210.9 LBS | OXYGEN SATURATION: 97 % | DIASTOLIC BLOOD PRESSURE: 72 MMHG | BODY MASS INDEX: 31.24 KG/M2 | SYSTOLIC BLOOD PRESSURE: 120 MMHG | HEART RATE: 73 BPM | HEIGHT: 69 IN

## 2020-03-11 DIAGNOSIS — M80.00XS AGE-RELATED OSTEOPOROSIS WITH CURRENT PATHOLOGICAL FRACTURE, SEQUELA: ICD-10-CM

## 2020-03-11 DIAGNOSIS — E04.1 LEFT THYROID NODULE: ICD-10-CM

## 2020-03-11 DIAGNOSIS — E21.0 PRIMARY HYPERPARATHYROIDISM (HCC): ICD-10-CM

## 2020-03-11 DIAGNOSIS — E83.42 HYPOMAGNESEMIA: ICD-10-CM

## 2020-03-11 DIAGNOSIS — E11.9 CONTROLLED TYPE 2 DIABETES MELLITUS WITHOUT COMPLICATION, WITHOUT LONG-TERM CURRENT USE OF INSULIN (HCC): Primary | ICD-10-CM

## 2020-03-11 DIAGNOSIS — E55.9 VITAMIN D DEFICIENCY: ICD-10-CM

## 2020-03-11 PROCEDURE — 99214 OFFICE O/P EST MOD 30 MIN: CPT | Performed by: INTERNAL MEDICINE

## 2020-03-11 RX ORDER — CYANOCOBALAMIN 1000 UG/ML
1000 INJECTION, SOLUTION INTRAMUSCULAR; SUBCUTANEOUS
Qty: 1 ML | Refills: 3 | Status: SHIPPED | OUTPATIENT
Start: 2020-03-11 | End: 2020-12-11

## 2020-03-11 NOTE — PROGRESS NOTES
Rekha Dorado is a 64 y.o. female who presents for  evaluation of   Chief Complaint   Patient presents with   • Diabetes       Referring provider    No referring provider defined for this encounter.    Primary Care Provider    Mariam Morgan MD    Very pleasant 64-year-old female sp right superior parathyroidectomy in April 2018 for primary hyperparathyroidism    Complications, she has severe osteoporosis complicated by  wrist fracture.    She also has history of nephrolithiasis. In April 2019 had lithotripsy but no new stones     Quantity, calcium has been as high as 12.8, now nl     Lab Results   Component Value Date    PTH 15.2 12/04/2018    CALCIUM 10.3 03/06/2020    PHOS 3.1 10/30/2019     Lab Results   Component Value Date    CALCIUM 10.3 03/06/2020    CALCIUM 10.0 10/30/2019    CALCIUM 9.7 04/26/2019    CALCIUM 9.6 04/25/2019    CALCIUM 9.6 12/04/2018    CALCIUM 9.5 12/04/2018    CALCIUM 10.0 10/23/2018    CALCIUM 10.2 06/04/2018    CALCIUM 10.2 06/04/2018    CALCIUM 10.6 (H) 04/11/2018    CALCIUM 11.5 (H) 03/30/2018         Alleviating factors she is taking Boniva for osteoporosis as well as vitamin D 2000 units daily.    ----------    She also has type 2 diabetes presently on metformin monotherapy with elevated fasting glucose    Past Medical History:   Diagnosis Date   • Acid reflux    • Arthritis    • Asthma    • Controlled type 2 diabetes mellitus without complication, without long-term current use of insulin (CMS/[facility]) 12/19/2017   • High blood triglycerides    • Hypertension    • Left thyroid nodule 12/19/2017   • Primary hyperparathyroidism (CMS/[facility]) 12/19/2017   • Severe osteopetrosis 12/19/2017   • Skin cancer    • Sleep apnea      Family History   Problem Relation Age of Onset   • Diabetes Mother      Social History     Tobacco Use   • Smoking status: Never Smoker   • Smokeless tobacco: Never Used   Substance Use Topics   • Alcohol use: No   • Drug use: No         Current Outpatient  Medications:   •  aspirin 81 MG EC tablet, Take 81 mg by mouth Every Night., Disp: , Rfl:   •  budesonide-formoterol (SYMBICORT) 160-4.5 MCG/ACT inhaler, Inhale 2 puffs 2 (Two) Times a Day., Disp: , Rfl:   •  carvedilol (COREG) 3.125 MG tablet, Take 3.125 mg by mouth 2 (Two) Times a Day With Meals., Disp: , Rfl:   •  cetirizine (zyrTEC) 10 MG tablet, Take 10 mg by mouth Every Night., Disp: , Rfl:   •  Cholecalciferol (VITAMIN D) 2000 units tablet, Take 2,000 Units by mouth Daily., Disp: , Rfl:   •  choline fenofibrate (TRILIPIX) 135 MG capsule, Take 135 mg by mouth Every Night., Disp: , Rfl:   •  ciprofloxacin (CIPRO) 250 MG tablet, Take 250 mg by mouth 2 (Two) Times a Day., Disp: , Rfl:   •  CRANBERRY EXTRACT PO, Take  by mouth 2 (Two) Times a Day. 2 tabs, Disp: , Rfl:   •  cyanocobalamin 1000 MCG/ML injection, Inject 1 mL into the appropriate muscle as directed by prescriber Every 28 (Twenty-Eight) Days., Disp: 1 mL, Rfl: 3  •  dicyclomine (BENTYL) 10 MG capsule, Take 10 mg by mouth 3 (Three) Times a Day As Needed., Disp: , Rfl:   •  glucosamine sulfate 500 MG capsule capsule, Take  by mouth 2 (Two) Times a Day. 1 tab in the morning and 2 tabs at night, Disp: , Rfl:   •  KRILL OIL PO, Take  by mouth Every Night., Disp: , Rfl:   •  losartan (COZAAR) 100 MG tablet, Take 100 mg by mouth Daily., Disp: , Rfl:   •  Magnesium Oxide 200 MG tablet, 200 mg po daily, Disp: 30 tablet, Rfl: 11  •  montelukast (SINGULAIR) 10 MG tablet, Take 10 mg by mouth Every Night., Disp: , Rfl:   •  olopatadine (PATADAY) 0.2 % solution ophthalmic solution, Daily As Needed., Disp: , Rfl:   •  omeprazole (priLOSEC) 20 MG capsule, Take 20 mg by mouth Every Night., Disp: , Rfl:   •  SITagliptin-metFORMIN HCl ER (JANUMET XR) 100-1000 MG tablet, Take 1 tablet by mouth Daily., Disp: 90 tablet, Rfl: 3  •  metFORMIN (GLUCOPHAGE) 500 MG tablet, Take 500 mg by mouth Every Night., Disp: , Rfl:     Review of Systems    Review of Systems  "  Constitutional: Negative for activity change, appetite change, chills, diaphoresis, fatigue, fever and unexpected weight change.   HENT: Negative for congestion, dental problem, drooling, ear discharge, ear pain, facial swelling, mouth sores, postnasal drip, rhinorrhea, sinus pressure, sore throat, tinnitus, trouble swallowing and voice change.    Eyes: Negative for photophobia, pain, discharge, redness, itching and visual disturbance.   Respiratory: Negative for apnea, cough, choking, chest tightness, shortness of breath, wheezing and stridor.    Cardiovascular: Negative for chest pain, palpitations and leg swelling.   Gastrointestinal: Negative for abdominal distention, abdominal pain, constipation, diarrhea, nausea and vomiting.   Endocrine: Negative for cold intolerance, heat intolerance, polydipsia, polyphagia and polyuria.   Genitourinary: Negative for decreased urine volume, difficulty urinating, dysuria, flank pain, frequency, hematuria and urgency.   Musculoskeletal: Negative for arthralgias, back pain, gait problem, joint swelling, myalgias, neck pain and neck stiffness.   Skin: Negative for color change, pallor, rash and wound.   Allergic/Immunologic: Negative for immunocompromised state.   Neurological: Negative for dizziness, tremors, seizures, syncope, facial asymmetry, speech difficulty, weakness, light-headedness, numbness and headaches.   Hematological: Negative for adenopathy.   Psychiatric/Behavioral: Negative for agitation, behavioral problems, confusion, decreased concentration, dysphoric mood, hallucinations, self-injury, sleep disturbance and suicidal ideas. The patient is not nervous/anxious and is not hyperactive.         Objective:   /72   Pulse 73   Ht 175.3 cm (69\")   Wt 95.7 kg (210 lb 14.4 oz)   SpO2 97%   BMI 31.14 kg/m²     Physical Exam   Constitutional: She is oriented to person, place, and time. She appears well-developed.   HENT:   Head: Normocephalic.   Right Ear: " External ear normal.   Left Ear: External ear normal.   Nose: Nose normal.   Eyes: Conjunctivae and EOM are normal. No scleral icterus.   Neck: Normal range of motion. Neck supple. No tracheal deviation present. Thyromegaly present.   Cardiovascular: Normal rate, regular rhythm, normal heart sounds and intact distal pulses. Exam reveals no gallop and no friction rub.   No murmur heard.  Pulmonary/Chest: Effort normal and breath sounds normal. No stridor. No respiratory distress. She has no wheezes. She has no rales. She exhibits no tenderness.   Abdominal: Soft. Bowel sounds are normal. She exhibits no distension and no mass. There is no tenderness. There is no rebound and no guarding.   Musculoskeletal: Normal range of motion. She exhibits no tenderness or deformity.   Lymphadenopathy:     She has no cervical adenopathy.   Neurological: She is alert and oriented to person, place, and time. She displays normal reflexes. She exhibits normal muscle tone. Coordination normal.   Skin: No rash noted. No erythema. No pallor.   Psychiatric: She has a normal mood and affect. Her behavior is normal. Judgment and thought content normal.       Lab Review    Results for orders placed or performed in visit on 10/30/19   Calcium   Result Value Ref Range    Calcium 10.0 8.6 - 10.5 mg/dL   Phosphorus   Result Value Ref Range    Phosphorus 3.1 2.5 - 4.5 mg/dL   Magnesium   Result Value Ref Range    Magnesium 1.8 1.6 - 2.4 mg/dL           Assessment/Plan       ICD-10-CM ICD-9-CM   1. Controlled type 2 diabetes mellitus without complication, without long-term current use of insulin (CMS/[facility]) E11.9 250.00   2. Vitamin D deficiency E55.9 268.9   3. Primary hyperparathyroidism (CMS/[facility]) E21.0 252.01   4. Left thyroid nodule E04.1 241.0   5. Age-related osteoporosis with current pathological fracture, sequela M80.00XS 905.5     733.01         Diabetes, on Janumet 100/1000 mg extended release one tablet with breakfast  Plus 500 mg extra of  metformin     Keep Janumet only - express scripts     Lab Results   Component Value Date    HGBA1C 6.50 (H) 03/06/2020       ===========    Primary hyperparathyroidism - cured by right sup parathyroidectomy in Feb 2018    2000 units of vitamin D and calcium 400 to 600 mg bid, change to qd if eating dietary calcium     Now on prolia     Lab Results   Component Value Date    CALCIUM 10.3 03/06/2020    CALCIUM 10.0 10/30/2019    CALCIUM 9.7 04/26/2019     Lab Results   Component Value Date    PTH 15.2 12/04/2018    CALCIUM 10.3 03/06/2020    PHOS 3.1 10/30/2019     12-18 last DXA with increased bone density     --    Diffuse goiter, nl TSH    No need for repeat US , just follow TSH     Lab Results   Component Value Date    TSH 2.400 03/06/2020    TSH 1.710 04/25/2019    TSH 2.510 12/04/2018       Thyroid Antibodies  TPO AB  Lab Results   Component Value Date    THYROIDAB 7 12/04/2018       ==    Anemia    Will have colonoscopy     ==    Low Magnesium     Start Mg Oxide 200 mg daily - doing OTC    Now nl magnesium     ==    Low b12 on IM injection - sergo        A copy of my note was sent to Mariam Morgan MD    Please see my above opinion and suggestions.

## 2020-04-29 ENCOUNTER — TELEPHONE (OUTPATIENT)
Dept: ONCOLOGY | Facility: CLINIC | Age: 65
End: 2020-04-29

## 2020-04-29 NOTE — TELEPHONE ENCOUNTER
Pt called and wants to see if her time for her appt on 05/01 at 2:00pm can be changed to a early morning appt?    Please call pt asap       Thanks

## 2020-05-01 ENCOUNTER — INFUSION (OUTPATIENT)
Dept: ONCOLOGY | Facility: HOSPITAL | Age: 65
End: 2020-05-01

## 2020-05-01 ENCOUNTER — LAB (OUTPATIENT)
Dept: ONCOLOGY | Facility: HOSPITAL | Age: 65
End: 2020-05-01

## 2020-05-01 VITALS — DIASTOLIC BLOOD PRESSURE: 60 MMHG | HEART RATE: 64 BPM | SYSTOLIC BLOOD PRESSURE: 119 MMHG | TEMPERATURE: 97 F

## 2020-05-01 DIAGNOSIS — M81.6 LOCALIZED OSTEOPOROSIS WITHOUT CURRENT PATHOLOGICAL FRACTURE: Primary | ICD-10-CM

## 2020-05-01 DIAGNOSIS — E21.0 PRIMARY HYPERPARATHYROIDISM (HCC): ICD-10-CM

## 2020-05-01 LAB
CALCIUM SPEC-SCNC: 10.8 MG/DL (ref 8.6–10.5)
HOLD SPECIMEN: NORMAL
MAGNESIUM SERPL-MCNC: 1.5 MG/DL (ref 1.6–2.4)
PHOSPHATE SERPL-MCNC: 3.2 MG/DL (ref 2.5–4.5)

## 2020-05-01 PROCEDURE — 96372 THER/PROPH/DIAG INJ SC/IM: CPT | Performed by: NURSE PRACTITIONER

## 2020-05-01 PROCEDURE — 82310 ASSAY OF CALCIUM: CPT | Performed by: NURSE PRACTITIONER

## 2020-05-01 PROCEDURE — 84100 ASSAY OF PHOSPHORUS: CPT | Performed by: NURSE PRACTITIONER

## 2020-05-01 PROCEDURE — 83735 ASSAY OF MAGNESIUM: CPT | Performed by: NURSE PRACTITIONER

## 2020-05-01 PROCEDURE — 36415 COLL VENOUS BLD VENIPUNCTURE: CPT | Performed by: NURSE PRACTITIONER

## 2020-05-01 PROCEDURE — 25010000002 DENOSUMAB 60 MG/ML SOLUTION PREFILLED SYRINGE: Performed by: INTERNAL MEDICINE

## 2020-05-01 RX ADMIN — DENOSUMAB 60 MG: 60 INJECTION SUBCUTANEOUS at 10:29

## 2020-05-01 NOTE — CODE DOCUMENTATION
Pt states she does take an oral magnesium supplement from [facility]. Unsure of dosage at this time.

## 2020-10-30 ENCOUNTER — TELEPHONE (OUTPATIENT)
Dept: ONCOLOGY | Facility: CLINIC | Age: 65
End: 2020-10-30

## 2020-10-30 NOTE — TELEPHONE ENCOUNTER
Caller: Rekha    Relationship to patient: Self    Best call back number: [phone]    Type of visit: Lab and injection     Requested date: 11/16, 1pm or later     If rescheduling, when is the original appointment: 11/02    Additional notes: Possible exposure to COVID. Tested today and will get results back likely Monday 11/02

## 2020-11-02 ENCOUNTER — APPOINTMENT (OUTPATIENT)
Dept: ONCOLOGY | Facility: HOSPITAL | Age: 65
End: 2020-11-02

## 2020-11-10 DIAGNOSIS — M80.00XS AGE-RELATED OSTEOPOROSIS WITH CURRENT PATHOLOGICAL FRACTURE, SEQUELA: Primary | ICD-10-CM

## 2020-11-10 DIAGNOSIS — M80.08XA AGE-RELATED OSTEOPOROSIS WITH CURRENT PATHOLOGICAL FRACTURE, VERTEBRA(E), INITIAL ENCOUNTER FOR FRACTURE (HCC): ICD-10-CM

## 2020-11-16 ENCOUNTER — INFUSION (OUTPATIENT)
Dept: ONCOLOGY | Facility: HOSPITAL | Age: 65
End: 2020-11-16

## 2020-11-16 ENCOUNTER — LAB (OUTPATIENT)
Dept: ONCOLOGY | Facility: HOSPITAL | Age: 65
End: 2020-11-16

## 2020-11-16 VITALS
HEART RATE: 85 BPM | DIASTOLIC BLOOD PRESSURE: 56 MMHG | RESPIRATION RATE: 18 BRPM | TEMPERATURE: 98 F | SYSTOLIC BLOOD PRESSURE: 155 MMHG

## 2020-11-16 DIAGNOSIS — M81.6 LOCALIZED OSTEOPOROSIS WITHOUT CURRENT PATHOLOGICAL FRACTURE: ICD-10-CM

## 2020-11-16 DIAGNOSIS — M19.90 OSTEOARTHRITIS, UNSPECIFIED OSTEOARTHRITIS TYPE, UNSPECIFIED SITE: Primary | ICD-10-CM

## 2020-11-16 DIAGNOSIS — E21.0 PRIMARY HYPERPARATHYROIDISM (HCC): ICD-10-CM

## 2020-11-16 DIAGNOSIS — Q78.2 SEVERE OSTEOPETROSIS: Primary | ICD-10-CM

## 2020-11-16 LAB
CALCIUM SPEC-SCNC: 11 MG/DL (ref 8.6–10.5)
MAGNESIUM SERPL-MCNC: 1.7 MG/DL (ref 1.6–2.4)
PHOSPHATE SERPL-MCNC: 4.4 MG/DL (ref 2.5–4.5)

## 2020-11-16 PROCEDURE — 96372 THER/PROPH/DIAG INJ SC/IM: CPT | Performed by: NURSE PRACTITIONER

## 2020-11-16 PROCEDURE — 25010000002 DENOSUMAB 60 MG/ML SOLUTION PREFILLED SYRINGE: Performed by: INTERNAL MEDICINE

## 2020-11-16 PROCEDURE — 83735 ASSAY OF MAGNESIUM: CPT

## 2020-11-16 PROCEDURE — 84100 ASSAY OF PHOSPHORUS: CPT

## 2020-11-16 PROCEDURE — 82310 ASSAY OF CALCIUM: CPT

## 2020-11-16 PROCEDURE — 36415 COLL VENOUS BLD VENIPUNCTURE: CPT | Performed by: NURSE PRACTITIONER

## 2020-11-16 RX ADMIN — DENOSUMAB 60 MG: 60 INJECTION SUBCUTANEOUS at 15:16

## 2020-12-03 ENCOUNTER — LAB (OUTPATIENT)
Dept: LAB | Facility: HOSPITAL | Age: 65
End: 2020-12-03

## 2020-12-03 LAB
25(OH)D3 SERPL-MCNC: 62.2 NG/ML (ref 30–100)
ALBUMIN SERPL-MCNC: 4.4 G/DL (ref 3.5–5.2)
ALBUMIN UR-MCNC: <1.2 MG/DL
ALBUMIN/GLOB SERPL: 1.9 G/DL
ALP SERPL-CCNC: 74 U/L (ref 39–117)
ALT SERPL W P-5'-P-CCNC: 23 U/L (ref 1–33)
ANION GAP SERPL CALCULATED.3IONS-SCNC: 11.4 MMOL/L (ref 5–15)
AST SERPL-CCNC: 18 U/L (ref 1–32)
BASOPHILS # BLD AUTO: 0.05 10*3/MM3 (ref 0–0.2)
BASOPHILS NFR BLD AUTO: 0.8 % (ref 0–1.5)
BILIRUB SERPL-MCNC: 0.2 MG/DL (ref 0–1.2)
BUN SERPL-MCNC: 15 MG/DL (ref 8–23)
BUN/CREAT SERPL: 19 (ref 7–25)
CALCIUM SPEC-SCNC: 9.2 MG/DL (ref 8.6–10.5)
CHLORIDE SERPL-SCNC: 104 MMOL/L (ref 98–107)
CHOLEST SERPL-MCNC: 169 MG/DL (ref 0–200)
CO2 SERPL-SCNC: 24.6 MMOL/L (ref 22–29)
CREAT SERPL-MCNC: 0.79 MG/DL (ref 0.57–1)
CREAT UR-MCNC: 78.3 MG/DL
DEPRECATED RDW RBC AUTO: 41.1 FL (ref 37–54)
EOSINOPHIL # BLD AUTO: 0.2 10*3/MM3 (ref 0–0.4)
EOSINOPHIL NFR BLD AUTO: 3.1 % (ref 0.3–6.2)
ERYTHROCYTE [DISTWIDTH] IN BLOOD BY AUTOMATED COUNT: 12.5 % (ref 12.3–15.4)
GFR SERPL CREATININE-BSD FRML MDRD: 73 ML/MIN/1.73
GLOBULIN UR ELPH-MCNC: 2.3 GM/DL
GLUCOSE SERPL-MCNC: 157 MG/DL (ref 65–99)
HBA1C MFR BLD: 7.4 % (ref 4.8–5.6)
HCT VFR BLD AUTO: 35.6 % (ref 34–46.6)
HDLC SERPL-MCNC: 38 MG/DL (ref 40–60)
HGB BLD-MCNC: 12.2 G/DL (ref 12–15.9)
IMM GRANULOCYTES # BLD AUTO: 0.03 10*3/MM3 (ref 0–0.05)
IMM GRANULOCYTES NFR BLD AUTO: 0.5 % (ref 0–0.5)
LDLC SERPL CALC-MCNC: 87 MG/DL (ref 0–100)
LDLC/HDLC SERPL: 2.04 {RATIO}
LYMPHOCYTES # BLD AUTO: 1.4 10*3/MM3 (ref 0.7–3.1)
LYMPHOCYTES NFR BLD AUTO: 21.5 % (ref 19.6–45.3)
MAGNESIUM SERPL-MCNC: 1.7 MG/DL (ref 1.6–2.4)
MCH RBC QN AUTO: 30.8 PG (ref 26.6–33)
MCHC RBC AUTO-ENTMCNC: 34.3 G/DL (ref 31.5–35.7)
MCV RBC AUTO: 89.9 FL (ref 79–97)
MICROALBUMIN/CREAT UR: NORMAL MG/G{CREAT}
MONOCYTES # BLD AUTO: 0.41 10*3/MM3 (ref 0.1–0.9)
MONOCYTES NFR BLD AUTO: 6.3 % (ref 5–12)
NEUTROPHILS NFR BLD AUTO: 4.43 10*3/MM3 (ref 1.7–7)
NEUTROPHILS NFR BLD AUTO: 67.8 % (ref 42.7–76)
NRBC BLD AUTO-RTO: 0 /100 WBC (ref 0–0.2)
PLATELET # BLD AUTO: 212 10*3/MM3 (ref 140–450)
PMV BLD AUTO: 11.8 FL (ref 6–12)
POTASSIUM SERPL-SCNC: 3.9 MMOL/L (ref 3.5–5.2)
PROT SERPL-MCNC: 6.7 G/DL (ref 6–8.5)
RBC # BLD AUTO: 3.96 10*6/MM3 (ref 3.77–5.28)
SODIUM SERPL-SCNC: 140 MMOL/L (ref 136–145)
TRIGL SERPL-MCNC: 267 MG/DL (ref 0–150)
TSH SERPL DL<=0.05 MIU/L-ACNC: 1.78 UIU/ML (ref 0.27–4.2)
VIT B12 BLD-MCNC: 167 PG/ML (ref 211–946)
VLDLC SERPL-MCNC: 44 MG/DL (ref 5–40)
WBC # BLD AUTO: 6.52 10*3/MM3 (ref 3.4–10.8)

## 2020-12-03 PROCEDURE — 80053 COMPREHEN METABOLIC PANEL: CPT | Performed by: INTERNAL MEDICINE

## 2020-12-03 PROCEDURE — 83735 ASSAY OF MAGNESIUM: CPT | Performed by: INTERNAL MEDICINE

## 2020-12-03 PROCEDURE — 85025 COMPLETE CBC W/AUTO DIFF WBC: CPT | Performed by: INTERNAL MEDICINE

## 2020-12-03 PROCEDURE — 82043 UR ALBUMIN QUANTITATIVE: CPT | Performed by: INTERNAL MEDICINE

## 2020-12-03 PROCEDURE — 82306 VITAMIN D 25 HYDROXY: CPT | Performed by: INTERNAL MEDICINE

## 2020-12-03 PROCEDURE — 82607 VITAMIN B-12: CPT | Performed by: INTERNAL MEDICINE

## 2020-12-03 PROCEDURE — 83036 HEMOGLOBIN GLYCOSYLATED A1C: CPT | Performed by: INTERNAL MEDICINE

## 2020-12-03 PROCEDURE — 80061 LIPID PANEL: CPT | Performed by: INTERNAL MEDICINE

## 2020-12-03 PROCEDURE — 84443 ASSAY THYROID STIM HORMONE: CPT | Performed by: INTERNAL MEDICINE

## 2020-12-03 PROCEDURE — 82570 ASSAY OF URINE CREATININE: CPT | Performed by: INTERNAL MEDICINE

## 2020-12-11 ENCOUNTER — LAB (OUTPATIENT)
Dept: LAB | Facility: HOSPITAL | Age: 65
End: 2020-12-11

## 2020-12-11 ENCOUNTER — OFFICE VISIT (OUTPATIENT)
Dept: ENDOCRINOLOGY | Facility: CLINIC | Age: 65
End: 2020-12-11

## 2020-12-11 VITALS
DIASTOLIC BLOOD PRESSURE: 80 MMHG | HEIGHT: 69 IN | WEIGHT: 213 LBS | OXYGEN SATURATION: 96 % | BODY MASS INDEX: 31.55 KG/M2 | SYSTOLIC BLOOD PRESSURE: 120 MMHG | HEART RATE: 76 BPM

## 2020-12-11 DIAGNOSIS — R82.994 HYPERCALCIURIA: ICD-10-CM

## 2020-12-11 DIAGNOSIS — E11.65 TYPE 2 DIABETES MELLITUS WITH HYPERGLYCEMIA, WITHOUT LONG-TERM CURRENT USE OF INSULIN (HCC): ICD-10-CM

## 2020-12-11 DIAGNOSIS — E53.8 B12 DEFICIENCY: ICD-10-CM

## 2020-12-11 DIAGNOSIS — M80.00XD OSTEOPOROSIS WITH PATHOLOGICAL FRACTURE WITH ROUTINE HEALING, SUBSEQUENT ENCOUNTER: Primary | ICD-10-CM

## 2020-12-11 LAB
CALCIUM SPEC-SCNC: 9.6 MG/DL (ref 8.6–10.5)
CORTIS AM PEAK SERPL-MCNC: 13.89 MCG/DL
PTH-INTACT SERPL-MCNC: 11.7 PG/ML (ref 15–65)

## 2020-12-11 PROCEDURE — 83516 IMMUNOASSAY NONANTIBODY: CPT | Performed by: INTERNAL MEDICINE

## 2020-12-11 PROCEDURE — 83970 ASSAY OF PARATHORMONE: CPT | Performed by: INTERNAL MEDICINE

## 2020-12-11 PROCEDURE — 36415 COLL VENOUS BLD VENIPUNCTURE: CPT | Performed by: INTERNAL MEDICINE

## 2020-12-11 PROCEDURE — 99214 OFFICE O/P EST MOD 30 MIN: CPT | Performed by: INTERNAL MEDICINE

## 2020-12-11 PROCEDURE — 82024 ASSAY OF ACTH: CPT | Performed by: INTERNAL MEDICINE

## 2020-12-11 PROCEDURE — 82784 ASSAY IGA/IGD/IGG/IGM EACH: CPT | Performed by: INTERNAL MEDICINE

## 2020-12-11 PROCEDURE — 82310 ASSAY OF CALCIUM: CPT | Performed by: INTERNAL MEDICINE

## 2020-12-11 PROCEDURE — 82523 COLLAGEN CROSSLINKS: CPT | Performed by: INTERNAL MEDICINE

## 2020-12-11 PROCEDURE — 82533 TOTAL CORTISOL: CPT | Performed by: INTERNAL MEDICINE

## 2020-12-11 RX ORDER — METFORMIN HYDROCHLORIDE 500 MG/1
TABLET, EXTENDED RELEASE ORAL
Qty: 90 TABLET | Refills: 3 | Status: SHIPPED | OUTPATIENT
Start: 2020-12-11 | End: 2021-02-15 | Stop reason: SDUPTHER

## 2020-12-11 RX ORDER — GREEN TEA/HOODIA GORDONII 315-12.5MG
CAPSULE ORAL
Qty: 15 TABLET | Refills: 11
Start: 2020-12-11

## 2020-12-11 RX ORDER — DEXAMETHASONE 1 MG
1 TABLET ORAL ONCE
Qty: 1 TABLET | Refills: 0 | Status: SHIPPED | OUTPATIENT
Start: 2020-12-11 | End: 2020-12-11

## 2020-12-11 RX ORDER — FEXOFENADINE HYDROCHLORIDE 60 MG/1
60 TABLET, FILM COATED ORAL DAILY
COMMUNITY
End: 2021-12-20

## 2020-12-11 RX ORDER — ONDANSETRON 4 MG/1
4 TABLET, FILM COATED ORAL DAILY PRN
Qty: 30 TABLET | Refills: 11 | Status: SHIPPED | OUTPATIENT
Start: 2020-12-11 | End: 2021-12-11

## 2020-12-11 NOTE — PROGRESS NOTES
Rekha Dorado is a 65 y.o. female who presents for  evaluation of   Chief Complaint   Patient presents with   • Follow-up     6 month augusto type 2        Referring provider     Primary Care Provider    Mariam Morgan MD    Very pleasant 65-year-old female sp right superior parathyroidectomy in April 2018 for primary hyperparathyroidism  Pathology confirmed that left parathyroids were identified, biopsied and left intact  The right inf was not found    Intraoperative PTH decreased from 74 to 8 intraoperatively     Complications, she has severe osteoporosis complicated by  wrist fracture.    After parathyroidectomy we started on prolia and bone density has decreased at the femoral neck despite treatment w prolia, calcium and vit D         Lab Results   Component Value Date    PTH 11.7 (L) 12/11/2020    CALCIUM 9.6 12/11/2020    PHOS 4.4 11/16/2020            ----------    She also has type 2 diabetes w loss of control       Past Medical History:   Diagnosis Date   • Acid reflux    • Arthritis    • Asthma    • Controlled type 2 diabetes mellitus without complication, without long-term current use of insulin (CMS/[facility]) 12/19/2017   • High blood triglycerides    • Hypertension    • Left thyroid nodule 12/19/2017   • Primary hyperparathyroidism (CMS/[facility]) 12/19/2017   • Severe osteopetrosis 12/19/2017   • Skin cancer    • Sleep apnea      Family History   Problem Relation Age of Onset   • Diabetes Mother      Social History     Tobacco Use   • Smoking status: Never Smoker   • Smokeless tobacco: Never Used   Substance Use Topics   • Alcohol use: No   • Drug use: No         Current Outpatient Medications:   •  aspirin 81 MG EC tablet, Take 81 mg by mouth Every Night., Disp: , Rfl:   •  budesonide-formoterol (SYMBICORT) 160-4.5 MCG/ACT inhaler, Inhale 2 puffs 2 (Two) Times a Day., Disp: , Rfl:   •  carvedilol (COREG) 3.125 MG tablet, Take 3.125 mg by mouth 2 (Two) Times a Day With Meals., Disp: , Rfl:   •   Cholecalciferol (VITAMIN D) 2000 units tablet, Take 2,000 Units by mouth Daily., Disp: , Rfl:   •  choline fenofibrate (TRILIPIX) 135 MG capsule, Take 135 mg by mouth Every Night., Disp: , Rfl:   •  CRANBERRY EXTRACT PO, Take  by mouth 2 (Two) Times a Day. 2 tabs, Disp: , Rfl:   •  dicyclomine (BENTYL) 10 MG capsule, Take 10 mg by mouth 3 (Three) Times a Day As Needed., Disp: , Rfl:   •  fexofenadine (ALLEGRA) 60 MG tablet, Take 60 mg by mouth Daily., Disp: , Rfl:   •  glucosamine sulfate 500 MG capsule capsule, Take  by mouth 2 (Two) Times a Day. 1 tab in the morning and 2 tabs at night, Disp: , Rfl:   •  KRILL OIL PO, Take  by mouth Every Night., Disp: , Rfl:   •  losartan (COZAAR) 50 MG tablet, Take 50 mg by mouth Daily., Disp: , Rfl:   •  montelukast (SINGULAIR) 10 MG tablet, Take 10 mg by mouth Every Night., Disp: , Rfl:   •  olopatadine (PATADAY) 0.2 % solution ophthalmic solution, Daily As Needed., Disp: , Rfl:   •  omeprazole (priLOSEC) 20 MG capsule, Take 20 mg by mouth Every Night., Disp: , Rfl:   •  Cyanocobalamin (B-12) 500 MCG sublingual tablet, 500 mcgs under the tongue every other day, Disp: 15 tablet, Rfl: 11  •  Dulaglutide 0.75 MG/0.5ML solution pen-injector, Inject 0.75 mg under the skin into the appropriate area as directed 1 (One) Time Per Week., Disp: 12 pen, Rfl: 3  •  hydroCHLOROthiazide (HYDRODIURIL) 12.5 MG tablet, Take 1 tablet by mouth Daily., Disp: 30 tablet, Rfl: 11  •  metFORMIN ER (Glucophage XR) 500 MG 24 hr tablet, 1 tab w supper, Disp: 90 tablet, Rfl: 3  •  ondansetron (Zofran) 4 MG tablet, Take 1 tablet by mouth Daily As Needed for Nausea or Vomiting., Disp: 30 tablet, Rfl: 11    Review of Systems    Review of Systems   Constitutional: Negative for activity change, appetite change, chills, diaphoresis, fatigue, fever and unexpected weight change.   HENT: Negative for congestion, dental problem, drooling, ear discharge, ear pain, facial swelling, mouth sores, postnasal drip,  "rhinorrhea, sinus pressure, sore throat, tinnitus, trouble swallowing and voice change.    Eyes: Negative for photophobia, pain, discharge, redness, itching and visual disturbance.   Respiratory: Negative for apnea, cough, choking, chest tightness, shortness of breath, wheezing and stridor.    Cardiovascular: Negative for chest pain, palpitations and leg swelling.   Gastrointestinal: Negative for abdominal distention, abdominal pain, constipation, diarrhea, nausea and vomiting.   Endocrine: Negative for cold intolerance, heat intolerance, polydipsia, polyphagia and polyuria.   Genitourinary: Negative for decreased urine volume, difficulty urinating, dysuria, flank pain, frequency, hematuria and urgency.   Musculoskeletal: Negative for arthralgias, back pain, gait problem, joint swelling, myalgias, neck pain and neck stiffness.   Skin: Negative for color change, pallor, rash and wound.   Allergic/Immunologic: Negative for immunocompromised state.   Neurological: Negative for dizziness, tremors, seizures, syncope, facial asymmetry, speech difficulty, weakness, light-headedness, numbness and headaches.   Hematological: Negative for adenopathy.   Psychiatric/Behavioral: Negative for agitation, behavioral problems, confusion, decreased concentration, dysphoric mood, hallucinations, self-injury, sleep disturbance and suicidal ideas. The patient is not nervous/anxious and is not hyperactive.         Objective:   /80 (BP Location: Right arm, Patient Position: Sitting, Cuff Size: Large Adult)   Pulse 76   Ht 175.3 cm (69\")   Wt 96.6 kg (213 lb)   SpO2 96%   BMI 31.45 kg/m²     Physical Exam   Constitutional: She is oriented to person, place, and time. She appears well-developed.   HENT:   Head: Normocephalic.   Right Ear: External ear normal.   Left Ear: External ear normal.   Nose: Nose normal.   Eyes: Conjunctivae are normal. No scleral icterus.   Neck: Normal range of motion. Neck supple. No tracheal deviation " present. Thyromegaly present.   Cardiovascular: Normal rate, regular rhythm and normal heart sounds. Exam reveals no gallop and no friction rub.   No murmur heard.  Pulmonary/Chest: Effort normal and breath sounds normal. No stridor. No respiratory distress. She has no wheezes. She has no rales. She exhibits no tenderness.   Abdominal: Soft. Bowel sounds are normal. She exhibits no distension and no mass. There is no abdominal tenderness. There is no rebound and no guarding.   Musculoskeletal: Normal range of motion. No tenderness or deformity.   Lymphadenopathy:     She has no cervical adenopathy.   Neurological: She is alert and oriented to person, place, and time. She displays normal reflexes. She exhibits normal muscle tone. Coordination normal.   Skin: No rash noted. No erythema. No pallor.   Psychiatric: Her behavior is normal. Judgment and thought content normal.       Lab Review    Results for orders placed or performed in visit on 12/11/20   PTH, Intact & Calcium    Specimen: Blood   Result Value Ref Range    PTH, Intact 11.7 (L) 15.0 - 65.0 pg/mL    Calcium 9.6 8.6 - 10.5 mg/dL   Celiac Panel Reflex To Titer    Specimen: Blood   Result Value Ref Range    Gliadin Deamidated Peptide Ab, IgA 5 0 - 19 units    Tissue Transglutaminase IgA <2 0 - 3 U/mL    IgA 238 87 - 352 mg/dL   ACTH    Specimen: Blood   Result Value Ref Range    ACTH 30.7 7.2 - 63.3 pg/mL   Cortisol - AM    Specimen: Blood   Result Value Ref Range    Cortisol - AM 13.89 mcg/dL   C-Telopeptide, Serum    Specimen: Blood   Result Value Ref Range    C-Telopeptides 94 pg/mL   Calcium, Urine, 24 Hour - Urine, Clean Catch    Specimen: Urine, Clean Catch; 24 Hour Urine   Result Value Ref Range    Calcium, 24H Urine 327.7 (H) 100.0 - 300.0 mg/24 hr    Calcium, Urine 11.3 mg/dL    24H Urine Volume 2,900 mL    Time (Hours) 24 hrs   Creatinine, Urine, Random - Urine, Clean Catch    Specimen: Urine, Clean Catch   Result Value Ref Range    Creatinine,  Urine 31.6 mg/dL           Assessment/Plan       ICD-10-CM ICD-9-CM   1. Osteoporosis with pathological fracture with routine healing, subsequent encounter  M80.00XD V54.29   2. Hypercalciuria  R82.994 275.40   3. Type 2 diabetes mellitus with hyperglycemia, without long-term current use of insulin (CMS/[facility])  E11.65 250.00     790.29   4. B12 deficiency  E53.8 266.2           Type 2 diabetes     On Janumet and metformin     Change to     metformin 500 mg at night and  trulicity 0.75 mg weekly       Lab Results   Component Value Date    HGBA1C 7.40 (H) 12/03/2020    HGBA1C 6.50 (H) 03/06/2020    HGBA1C 5.9 06/12/2019     Lab Results   Component Value Date    MICROALBUR <1.2 12/03/2020    CREATININE 0.79 12/03/2020       ===========    Osteoporosis      Primary hyperparathyroidism - cured by right sup parathyroidectomy in Feb 2018  Dr. Rodriguez correctly identified Left normal sup and inferior parathyroids  Right inferior never identified   She was clearly cured after surgery     Component      Latest Ref Rng & Units 1/23/2018           8:58 AM   PTH Intact (Serial Monitor)      15.0 - 65.0 pg/mL 74.8 (H)   Calcium      8.6 - 10.5 mg/dL 12.9 (H)     Component      Latest Ref Rng & Units 6/4/2018  postop           8:07 AM   PTH Intact (Serial Monitor)      15.0 - 65.0 pg/mL 8.3 (L)   Calcium      8.6 - 10.5 mg/dL 10.2     Lab Results   Component Value Date    PTH 11.7 (L) 12/11/2020    CALCIUM 9.6 12/11/2020    PHOS 4.4 11/16/2020         Dec 2020     ruled out celiac dx       Cushing's by LDDST    Component      Latest Ref Rng & Units 12/11/2020 12/14/2020 12/16/2020   ACTH      7.2 - 63.3 pg/mL 30.7  2.7 (L)   Cortisol - AM      mcg/dL 13.89  1.43       Hypercalciuria remains despite correction of Primary Hyperparathyroidism start HCTZ     Component      Latest Ref Rng & Units 12/21/2017 12/14/2020   Calcium, 24H Urine      100.0 - 300.0 mg/24 hr 535.0 (H) 327.7 (H)   Calcium, Urine      mg/dL 21.4 11.3   Urine Volume       mL 2,500 2,900   Time (Hours)      hrs 24 24       2000 units of vitamin D and calcium 400 to 600 mg bid or dietary calcium     Now on prolia since 2018 but     DXA Nov 2020  Stable at spine but declining at Femoral Neck , markers positioned correctly                 ----    PLAN --- START EVENITY 220 MCGS MONTHLY     RATIONALE, SHE HAS OSTEOPOROSIS WITH WRIST FRACTURE,  BONE DENSITY DECLINING DESPITE THE USE OF PROLIA AS PROVED ABOVE   BASED ON AACE GUIDELINES, WE HAVE A. FRACTURE, B. FAILED TREATMENT  SHE DOESN'T HAVE HISTORY OF STROKE OR MI     WE CAN'T USE TERIPARATIDE SINCE SHE HAS HISTORY OF PRIMARY HYPERPARATHYROIDISM     BESIDES IN A HEAD TO HEAD OF EVENITY VS TERIPARATIDE , BONE DENSITY GAIN WAS SUPERIOR W EVENITY     --    Diffuse goiter, nl TSH    No need for repeat US , just follow TSH     Lab Results   Component Value Date    TSH 1.780 12/03/2020    TSH 2.400 03/06/2020    TSH 1.710 04/25/2019       Thyroid Antibodies  TPO AB  Lab Results   Component Value Date    THYROIDAB 7 12/04/2018       ==    Anemia    Will have colonoscopy     ==    Low Magnesium     Start Mg Oxide 200 mg daily - doing OTC    Now nl magnesium     ==    Low b12 on IM injection - [facility]     Needs to take  Sublingual        A copy of my note was sent to Mariam Morgan MD    Please see my above opinion and suggestions.

## 2020-12-12 LAB
GLIADIN PEPTIDE IGA SER-ACNC: 5 UNITS (ref 0–19)
IGA SERPL-MCNC: 238 MG/DL (ref 87–352)
TTG IGA SER-ACNC: <2 U/ML (ref 0–3)

## 2020-12-14 ENCOUNTER — LAB (OUTPATIENT)
Dept: LAB | Facility: HOSPITAL | Age: 65
End: 2020-12-14

## 2020-12-14 LAB
ACTH PLAS-MCNC: 30.7 PG/ML (ref 7.2–63.3)
CALCIUM 24H UR-MCNC: 11.3 MG/DL
CALCIUM 24H UR-MRATE: 327.7 MG/24 HR (ref 100–300)
COLLECT DURATION TIME UR: 24 HRS
SPECIMEN VOL 24H UR: 2900 ML

## 2020-12-14 PROCEDURE — 81050 URINALYSIS VOLUME MEASURE: CPT | Performed by: INTERNAL MEDICINE

## 2020-12-14 PROCEDURE — 82340 ASSAY OF CALCIUM IN URINE: CPT | Performed by: INTERNAL MEDICINE

## 2020-12-15 RX ORDER — DEXAMETHASONE 1 MG
1 TABLET ORAL ONCE
Qty: 1 TABLET | Refills: 0 | Status: SHIPPED | OUTPATIENT
Start: 2020-12-15 | End: 2020-12-15

## 2020-12-16 ENCOUNTER — LAB (OUTPATIENT)
Dept: LAB | Facility: HOSPITAL | Age: 65
End: 2020-12-16

## 2020-12-16 DIAGNOSIS — E53.8 B12 DEFICIENCY: ICD-10-CM

## 2020-12-16 DIAGNOSIS — M81.6 LOCALIZED OSTEOPOROSIS WITHOUT CURRENT PATHOLOGICAL FRACTURE: ICD-10-CM

## 2020-12-16 DIAGNOSIS — E11.65 TYPE 2 DIABETES MELLITUS WITH HYPERGLYCEMIA, WITHOUT LONG-TERM CURRENT USE OF INSULIN (HCC): ICD-10-CM

## 2020-12-16 DIAGNOSIS — E21.0 PRIMARY HYPERPARATHYROIDISM (HCC): ICD-10-CM

## 2020-12-16 LAB
CORTIS AM PEAK SERPL-MCNC: 1.43 MCG/DL
CREAT UR-MCNC: 31.6 MG/DL

## 2020-12-16 PROCEDURE — 82533 TOTAL CORTISOL: CPT

## 2020-12-16 PROCEDURE — 82570 ASSAY OF URINE CREATININE: CPT | Performed by: INTERNAL MEDICINE

## 2020-12-16 PROCEDURE — 82024 ASSAY OF ACTH: CPT

## 2020-12-16 PROCEDURE — G0480 DRUG TEST DEF 1-7 CLASSES: HCPCS

## 2020-12-17 LAB
ACTH PLAS-MCNC: 2.7 PG/ML (ref 7.2–63.3)
COLLAGEN CTX SERPL-MCNC: 94 PG/ML

## 2020-12-21 PROBLEM — M80.00XA OSTEOPOROSIS WITH PATHOLOGICAL FRACTURE: Status: ACTIVE | Noted: 2020-12-21

## 2020-12-21 RX ORDER — HYDROCHLOROTHIAZIDE 12.5 MG/1
12.5 TABLET ORAL DAILY
Qty: 30 TABLET | Refills: 11 | Status: SHIPPED | OUTPATIENT
Start: 2020-12-21 | End: 2021-12-06

## 2020-12-22 NOTE — PROGRESS NOTES
[facility] and they said that the [place] office does the PA's. I then called the rep and let her know what they said but she is going ahead and sending me the form that is normally used to verify benefits and I am going to fill it out and send it just incase

## 2020-12-23 LAB — DEXAMETHASONE SERPL-MCNC: 366 NG/DL

## 2021-01-06 ENCOUNTER — APPOINTMENT (OUTPATIENT)
Dept: ONCOLOGY | Facility: HOSPITAL | Age: 66
End: 2021-01-06

## 2021-01-07 ENCOUNTER — INFUSION (OUTPATIENT)
Dept: ONCOLOGY | Facility: HOSPITAL | Age: 66
End: 2021-01-07

## 2021-01-07 VITALS — HEART RATE: 72 BPM | TEMPERATURE: 98.2 F | SYSTOLIC BLOOD PRESSURE: 140 MMHG | DIASTOLIC BLOOD PRESSURE: 62 MMHG

## 2021-01-07 DIAGNOSIS — M80.00XD OSTEOPOROSIS WITH PATHOLOGICAL FRACTURE WITH ROUTINE HEALING, SUBSEQUENT ENCOUNTER: Primary | ICD-10-CM

## 2021-01-07 PROCEDURE — 96372 THER/PROPH/DIAG INJ SC/IM: CPT | Performed by: NURSE PRACTITIONER

## 2021-01-07 PROCEDURE — 25010000002 ROMOSOZUMAB-AQQG 105 MG/1.17ML SOLUTION PREFILLED SYRINGE: Performed by: INTERNAL MEDICINE

## 2021-01-07 RX ADMIN — ROMOSOZUMAB-AQQG 210 MG: 105 INJECTION, SOLUTION SUBCUTANEOUS at 12:01

## 2021-01-07 NOTE — PATIENT INSTRUCTIONS
Romosozumab injection  What is this medicine?  ROMOSOZUMAB (vaughn jose antonio SOZ ue mab) increases bone formation. It is used to treat osteoporosis in women.  This medicine may be used for other purposes; ask your health care provider or pharmacist if you have questions.  COMMON BRAND NAME(S): EVENITY  What should I tell my health care provider before I take this medicine?  They need to know if you have any of these conditions:  · dental disease or wear dentures  · heart disease  · history of stroke  · kidney disease  · low levels of calcium in the blood  · an unusual or allergic reaction to romosozumab, other medicines, foods, dyes or preservatives  · pregnant or trying to get pregnant  · breast-feeding  How should I use this medicine?  This medicine is for injection under the skin. It is given by a healthcare professional in a hospital or clinic setting.  Talk to your pediatrician about the use of this medicine in children. Special care may be needed.  Overdosage: If you think you have taken too much of this medicine contact a poison control center or emergency room at once.  NOTE: This medicine is only for you. Do not share this medicine with others.  What if I miss a dose?  Keep appointments for follow-up doses. It is important not to miss your dose. Call your doctor or healthcare professional if you are unable to keep an appointment.  What may interact with this medicine?  Interactions are not expected.  This list may not describe all possible interactions. Give your health care provider a list of all the medicines, herbs, non-prescription drugs, or dietary supplements you use. Also tell them if you smoke, drink alcohol, or use illegal drugs. Some items may interact with your medicine.  What should I watch for while using this medicine?  Your condition will be monitored carefully while you are receiving this medicine.  You may need blood work done while you are taking this medicine.  Some people who take this medicine  have severe bone, joint, or muscle pain. This medicine may also increase your risk for jaw problems or a broken thigh bone. Tell your healthcare professional right away if you have severe pain in your jaw, bones, joints, or muscles. Tell your healthcare professional if you have any pain that does not go away or that gets worse.  You should make sure you get enough calcium and vitamin D while you are taking this medicine. Discuss the foods you eat and the vitamins you take with your healthcare professional.  Tell your dentist and dental surgeon that you are taking this medicine. You should not have major dental surgery while on this medicine. See your dentist to have a dental exam and fix any dental problems before starting this medicine. Take good care of your teeth while on this medicine. Make sure you see your dentist for regular follow-up appointments.  What side effects may I notice from receiving this medicine?  Side effects that you should report to your doctor or health care professional as soon as possible:  · allergic reactions like skin rash, itching or hives, swelling of the face, lips, or tongue  · bone pain  · chest pain or chest tightness  · jaw pain, especially after dental work  · signs and symptoms of a stroke like changes in vision; confusion; trouble speaking or understanding; severe headaches; sudden numbness or weakness of the face, arm or leg; trouble walking; dizziness; loss of balance or coordination  · signs and symptoms of low calcium like fast heartbeat; muscle cramps; muscle pain; pain, tingling, numbness in the hands or feet; seizures  Side effects that usually do not require medical attention (report these to your doctor or health care professional if they continue or are bothersome):  · headache  · joint pain  · pain, redness, or irritation at site where injected  · pain, tingling, numbness in the hands or feet  · swelling of ankles, feet, hands  · trouble sleeping  This list may not  describe all possible side effects. Call your doctor for medical advice about side effects. You may report side effects to FDA at 2-037-XEV-6410.  Where should I keep my medicine?  This medicine is given in a hospital or clinic and will not be stored at home.  NOTE: This sheet is a summary. It may not cover all possible information. If you have questions about this medicine, talk to your doctor, pharmacist, or health care provider.  © 2020 [employer] (2019-04-11 01:15:32)

## 2021-01-20 ENCOUNTER — LAB (OUTPATIENT)
Dept: LAB | Facility: HOSPITAL | Age: 66
End: 2021-01-20

## 2021-01-20 DIAGNOSIS — Q78.2 SEVERE OSTEOPETROSIS: ICD-10-CM

## 2021-01-20 LAB
ALBUMIN SERPL-MCNC: 4.6 G/DL (ref 3.5–5.2)
ANION GAP SERPL CALCULATED.3IONS-SCNC: 15.8 MMOL/L (ref 5–15)
BUN SERPL-MCNC: 21 MG/DL (ref 8–23)
BUN/CREAT SERPL: 24.7 (ref 7–25)
CALCIUM SPEC-SCNC: 10.2 MG/DL (ref 8.6–10.5)
CALCIUM SPEC-SCNC: 10.4 MG/DL (ref 8.6–10.5)
CHLORIDE SERPL-SCNC: 99 MMOL/L (ref 98–107)
CO2 SERPL-SCNC: 21.2 MMOL/L (ref 22–29)
CREAT SERPL-MCNC: 0.85 MG/DL (ref 0.57–1)
CREAT UR-MCNC: 48.5 MG/DL
GFR SERPL CREATININE-BSD FRML MDRD: 67 ML/MIN/1.73
GLUCOSE SERPL-MCNC: 328 MG/DL (ref 65–99)
MAGNESIUM SERPL-MCNC: 1.8 MG/DL (ref 1.6–2.4)
PHOSPHATE SERPL-MCNC: 3.8 MG/DL (ref 2.5–4.5)
POTASSIUM SERPL-SCNC: 4.2 MMOL/L (ref 3.5–5.2)
PTH-INTACT SERPL-MCNC: 10.2 PG/ML (ref 15–65)
SODIUM SERPL-SCNC: 136 MMOL/L (ref 136–145)

## 2021-01-20 PROCEDURE — 82310 ASSAY OF CALCIUM: CPT | Performed by: INTERNAL MEDICINE

## 2021-01-20 PROCEDURE — 83735 ASSAY OF MAGNESIUM: CPT

## 2021-01-20 PROCEDURE — 82570 ASSAY OF URINE CREATININE: CPT | Performed by: INTERNAL MEDICINE

## 2021-01-20 PROCEDURE — 80069 RENAL FUNCTION PANEL: CPT | Performed by: INTERNAL MEDICINE

## 2021-01-20 PROCEDURE — 83970 ASSAY OF PARATHORMONE: CPT | Performed by: INTERNAL MEDICINE

## 2021-01-22 PROCEDURE — 81050 URINALYSIS VOLUME MEASURE: CPT | Performed by: INTERNAL MEDICINE

## 2021-01-22 PROCEDURE — 82340 ASSAY OF CALCIUM IN URINE: CPT | Performed by: INTERNAL MEDICINE

## 2021-01-23 LAB
CALCIUM 24H UR-MCNC: 9.5 MG/DL
CALCIUM 24H UR-MRATE: 285 MG/24 HR (ref 100–300)
COLLECT DURATION TIME UR: 24 HRS
SPECIMEN VOL 24H UR: 3000 ML

## 2021-01-28 ENCOUNTER — TELEMEDICINE (OUTPATIENT)
Dept: ENDOCRINOLOGY | Facility: CLINIC | Age: 66
End: 2021-01-28

## 2021-01-28 DIAGNOSIS — R82.994 HYPERCALCIURIA: ICD-10-CM

## 2021-01-28 DIAGNOSIS — E11.65 TYPE 2 DIABETES MELLITUS WITH HYPERGLYCEMIA, WITHOUT LONG-TERM CURRENT USE OF INSULIN (HCC): Primary | ICD-10-CM

## 2021-01-28 DIAGNOSIS — E53.8 B12 DEFICIENCY: ICD-10-CM

## 2021-01-28 DIAGNOSIS — E55.9 VITAMIN D DEFICIENCY: ICD-10-CM

## 2021-01-28 DIAGNOSIS — M80.00XD OSTEOPOROSIS WITH PATHOLOGICAL FRACTURE WITH ROUTINE HEALING, SUBSEQUENT ENCOUNTER: ICD-10-CM

## 2021-01-28 PROCEDURE — 99214 OFFICE O/P EST MOD 30 MIN: CPT | Performed by: INTERNAL MEDICINE

## 2021-01-28 NOTE — PROGRESS NOTES
Rekha Dorado is a 65 y.o. female who presents for  evaluation of   Type 2 diabetes and osteoporosis                                      This was a Telehealth Encounter. Benefits and Disadvantages of a Telehealth Visit were discussed and accepted by patient. .  Patient agreed to receive service through Telehealth visit as patient is being compliant with social distancing recommendations imparted by CDC.     You have chosen to receive care through a telehealth visit.  Do you consent to use a video/audio connection for your medical care today? Yes        Referring provider     Primary Care Provider    Mariam Morgan MD    Very pleasant 65-year-old female sp right superior parathyroidectomy in April 2018 for primary hyperparathyroidism  Pathology confirmed that left parathyroids were identified, biopsied and left intact  The right inf was not found    Intraoperative PTH decreased from 74 to 8 intraoperatively     Complications, she has severe osteoporosis complicated by  wrist fracture.    After parathyroidectomy we started on prolia and bone density   decreased at the femoral neck despite treatment w prolia, calcium and vit D     Now on Evenity , started Dec 2020         Lab Results   Component Value Date    PTH 10.2 (L) 01/20/2021    CALCIUM 10.4 01/20/2021    CALCIUM 10.2 01/20/2021    PHOS 3.8 01/20/2021            ----------    She also has type 2 diabetes w loss of control but improved on trulicity w mild nausea       Past Medical History:   Diagnosis Date   • Acid reflux    • Arthritis    • Asthma    • Controlled type 2 diabetes mellitus without complication, without long-term current use of insulin (CMS/[facility]) 12/19/2017   • High blood triglycerides    • Hypertension    • Left thyroid nodule 12/19/2017   • Primary hyperparathyroidism (CMS/[facility]) 12/19/2017   • Severe osteopetrosis 12/19/2017   • Skin cancer    • Sleep apnea      Family History   Problem Relation Age of Onset   • Diabetes Mother       Social History     Tobacco Use   • Smoking status: Never Smoker   • Smokeless tobacco: Never Used   Substance Use Topics   • Alcohol use: No   • Drug use: No         Current Outpatient Medications:   •  aspirin 81 MG EC tablet, Take 81 mg by mouth Every Night., Disp: , Rfl:   •  budesonide-formoterol (SYMBICORT) 160-4.5 MCG/ACT inhaler, Inhale 2 puffs 2 (Two) Times a Day., Disp: , Rfl:   •  carvedilol (COREG) 3.125 MG tablet, Take 3.125 mg by mouth 2 (Two) Times a Day With Meals., Disp: , Rfl:   •  Cholecalciferol (VITAMIN D) 2000 units tablet, Take 2,000 Units by mouth Daily., Disp: , Rfl:   •  choline fenofibrate (TRILIPIX) 135 MG capsule, Take 135 mg by mouth Every Night., Disp: , Rfl:   •  CRANBERRY EXTRACT PO, Take  by mouth 2 (Two) Times a Day. 2 tabs, Disp: , Rfl:   •  Cyanocobalamin (B-12) 500 MCG sublingual tablet, 500 mcgs under the tongue every other day, Disp: 15 tablet, Rfl: 11  •  dicyclomine (BENTYL) 10 MG capsule, Take 10 mg by mouth 3 (Three) Times a Day As Needed., Disp: , Rfl:   •  Dulaglutide 0.75 MG/0.5ML solution pen-injector, Inject 0.75 mg under the skin into the appropriate area as directed 1 (One) Time Per Week., Disp: 12 pen, Rfl: 3  •  fexofenadine (ALLEGRA) 60 MG tablet, Take 60 mg by mouth Daily., Disp: , Rfl:   •  glucosamine sulfate 500 MG capsule capsule, Take  by mouth 2 (Two) Times a Day. 1 tab in the morning and 2 tabs at night, Disp: , Rfl:   •  hydroCHLOROthiazide (HYDRODIURIL) 12.5 MG tablet, Take 1 tablet by mouth Daily., Disp: 30 tablet, Rfl: 11  •  KRILL OIL PO, Take  by mouth Every Night., Disp: , Rfl:   •  losartan (COZAAR) 50 MG tablet, Take 50 mg by mouth Daily., Disp: , Rfl:   •  metFORMIN ER (Glucophage XR) 500 MG 24 hr tablet, 1 tab w supper, Disp: 90 tablet, Rfl: 3  •  montelukast (SINGULAIR) 10 MG tablet, Take 10 mg by mouth Every Night., Disp: , Rfl:   •  olopatadine (PATADAY) 0.2 % solution ophthalmic solution, Daily As Needed., Disp: , Rfl:   •  omeprazole  (priLOSEC) 20 MG capsule, Take 20 mg by mouth Every Night., Disp: , Rfl:   •  ondansetron (Zofran) 4 MG tablet, Take 1 tablet by mouth Daily As Needed for Nausea or Vomiting., Disp: 30 tablet, Rfl: 11    Review of Systems    Review of Systems   Feels well , no concerns     Objective:   There were no vitals taken for this visit.    Physical Exam   HENT:   Head: Normocephalic.   Neck: Normal range of motion. Neck supple.   Pulmonary/Chest: Effort normal and breath sounds normal.   Abdominal: Normal appearance.   Musculoskeletal:      Right lower leg: No edema.      Left lower leg: No edema.   Neurological: She is alert.       Lab Review    Results for orders placed or performed in visit on 01/20/21   Magnesium    Specimen: Blood   Result Value Ref Range    Magnesium 1.8 1.6 - 2.4 mg/dL           Assessment/Plan       ICD-10-CM ICD-9-CM   1. Type 2 diabetes mellitus with hyperglycemia, without long-term current use of insulin (CMS/[facility])  E11.65 250.00     790.29   2. Osteoporosis with pathological fracture with routine healing, subsequent encounter  M80.00XD V54.29   3. Hypercalciuria  R82.994 275.40   4. B12 deficiency  E53.8 266.2   5. Vitamin D deficiency  E55.9 268.9           Type 2 diabetes     On Janumet and metformin     Changed to     metformin 500 mg at night and  trulicity 0.75 mg weekly , glycemia better controlled    Had elevated fasting in 300s but had steroid injections     Now that on trulicity 0.75 better control but still elevated fasting ,     I didn't increase dose since some nausea      Lab Results   Component Value Date    HGBA1C 7.40 (H) 12/03/2020    HGBA1C 6.50 (H) 03/06/2020    HGBA1C 5.9 06/12/2019     Lab Results   Component Value Date    MICROALBUR <1.2 12/03/2020    CREATININE 0.85 01/20/2021       ===========    Osteoporosis      Primary hyperparathyroidism - cured by right sup parathyroidectomy in Feb 2018  Dr. Rodriguez correctly identified Left normal sup and inferior parathyroids  Right  inferior never identified   She was clearly cured after surgery     Component      Latest Ref Rng & Units 1/23/2018           8:58 AM   PTH Intact (Serial Monitor)      15.0 - 65.0 pg/mL 74.8 (H)   Calcium      8.6 - 10.5 mg/dL 12.9 (H)     Component      Latest Ref Rng & Units 6/4/2018  postop           8:07 AM   PTH Intact (Serial Monitor)      15.0 - 65.0 pg/mL 8.3 (L)   Calcium      8.6 - 10.5 mg/dL 10.2     Lab Results   Component Value Date    PTH 10.2 (L) 01/20/2021    CALCIUM 10.4 01/20/2021    CALCIUM 10.2 01/20/2021    PHOS 3.8 01/20/2021         Dec 2020     ruled out celiac dx       Cushing's by LDDST    Component      Latest Ref Rng & Units 12/11/2020 12/14/2020 12/16/2020   ACTH      7.2 - 63.3 pg/mL 30.7  2.7 (L)   Cortisol - AM      mcg/dL 13.89  1.43       Hypercalciuria remained despite correction of Primary Hyperparathyroidism I start HCTZ and now 24 h urine calcium less than 300     Component      Latest Ref Rng & Units 12/21/2017 12/14/2020   Calcium, 24H Urine      100.0 - 300.0 mg/24 hr 535.0 (H) 327.7 (H)   Calcium, Urine      mg/dL 21.4 11.3   Urine Volume      mL 2,500 2,900   Time (Hours)      hrs 24 24       2000 units of vitamin D and calcium 400 to 600 mg bid or dietary calcium       on prolia since 2018 but due to loss I changed to evenity      DXA Nov 2020  Stable at spine but declining at Femoral Neck , markers positioned correctly                 ----     EVENITY 220 MCGS MONTHLY     RATIONALE, SHE HAS OSTEOPOROSIS WITH WRIST FRACTURE,  BONE DENSITY DECLINING DESPITE THE USE OF PROLIA AS PROVED ABOVE   BASED ON AACE GUIDELINES, WE HAVE A. FRACTURE, B. FAILED TREATMENT  SHE DOESN'T HAVE HISTORY OF STROKE OR MI     WE CAN'T USE TERIPARATIDE SINCE SHE HAS HISTORY OF PRIMARY HYPERPARATHYROIDISM     BESIDES IN A HEAD TO HEAD OF EVENITY VS TERIPARATIDE , BONE DENSITY GAIN WAS SUPERIOR W EVENITY     --    Diffuse goiter, nl TSH    No need for repeat US , just follow TSH     Lab Results    Component Value Date    TSH 1.780 12/03/2020    TSH 2.400 03/06/2020    TSH 1.710 04/25/2019       Thyroid Antibodies  TPO AB  Lab Results   Component Value Date    THYROIDAB 7 12/04/2018       ==    Anemia    Will have colonoscopy     ==    Low Magnesium     Start Mg Oxide 200 mg daily - doing OTC    Now nl magnesium     ==    Low b12 on IM injection - [facility]     Needs to take  Sublingual        A copy of my note was sent to Mariam Morgan MD    Please see my above opinion and suggestions.       I spent 15 minutes reviewing patient electronic chart , reviewing medications , past history , active problems.   I provided advice regarding management of medical conditions, refilled prescriptions , ordered labs and arranged for future appointment.   Patient was advised to contact us if there were any unanswered questions or ongoing concerns.

## 2021-02-04 ENCOUNTER — APPOINTMENT (OUTPATIENT)
Dept: ONCOLOGY | Facility: HOSPITAL | Age: 66
End: 2021-02-04

## 2021-02-04 ENCOUNTER — INFUSION (OUTPATIENT)
Dept: ONCOLOGY | Facility: HOSPITAL | Age: 66
End: 2021-02-04

## 2021-02-04 VITALS
SYSTOLIC BLOOD PRESSURE: 150 MMHG | HEART RATE: 74 BPM | RESPIRATION RATE: 18 BRPM | TEMPERATURE: 96.4 F | DIASTOLIC BLOOD PRESSURE: 63 MMHG

## 2021-02-04 DIAGNOSIS — M80.00XD OSTEOPOROSIS WITH PATHOLOGICAL FRACTURE WITH ROUTINE HEALING, SUBSEQUENT ENCOUNTER: Primary | ICD-10-CM

## 2021-02-04 PROCEDURE — 25010000002 ROMOSOZUMAB-AQQG 105 MG/1.17ML SOLUTION PREFILLED SYRINGE: Performed by: INTERNAL MEDICINE

## 2021-02-04 PROCEDURE — 96372 THER/PROPH/DIAG INJ SC/IM: CPT | Performed by: NURSE PRACTITIONER

## 2021-02-04 RX ADMIN — ROMOSOZUMAB-AQQG 210 MG: 105 INJECTION, SOLUTION SUBCUTANEOUS at 09:31

## 2021-02-15 RX ORDER — METFORMIN HYDROCHLORIDE 500 MG/1
TABLET, EXTENDED RELEASE ORAL
Qty: 90 TABLET | Refills: 3 | Status: SHIPPED | OUTPATIENT
Start: 2021-02-15 | End: 2022-04-13 | Stop reason: SDUPTHER

## 2021-03-04 ENCOUNTER — INFUSION (OUTPATIENT)
Dept: ONCOLOGY | Facility: HOSPITAL | Age: 66
End: 2021-03-04

## 2021-03-04 VITALS
HEART RATE: 63 BPM | OXYGEN SATURATION: 97 % | RESPIRATION RATE: 20 BRPM | TEMPERATURE: 97.1 F | DIASTOLIC BLOOD PRESSURE: 62 MMHG | SYSTOLIC BLOOD PRESSURE: 138 MMHG

## 2021-03-04 DIAGNOSIS — M80.00XD OSTEOPOROSIS WITH PATHOLOGICAL FRACTURE WITH ROUTINE HEALING, SUBSEQUENT ENCOUNTER: Primary | ICD-10-CM

## 2021-03-04 PROCEDURE — 96372 THER/PROPH/DIAG INJ SC/IM: CPT | Performed by: NURSE PRACTITIONER

## 2021-03-04 PROCEDURE — 25010000002 ROMOSOZUMAB-AQQG 105 MG/1.17ML SOLUTION PREFILLED SYRINGE: Performed by: INTERNAL MEDICINE

## 2021-03-04 RX ADMIN — ROMOSOZUMAB-AQQG 210 MG: 105 INJECTION, SOLUTION SUBCUTANEOUS at 09:50

## 2021-04-01 ENCOUNTER — INFUSION (OUTPATIENT)
Dept: ONCOLOGY | Facility: HOSPITAL | Age: 66
End: 2021-04-01

## 2021-04-01 VITALS
TEMPERATURE: 97.2 F | DIASTOLIC BLOOD PRESSURE: 61 MMHG | RESPIRATION RATE: 18 BRPM | SYSTOLIC BLOOD PRESSURE: 135 MMHG | HEART RATE: 75 BPM

## 2021-04-01 DIAGNOSIS — M80.00XD OSTEOPOROSIS WITH PATHOLOGICAL FRACTURE WITH ROUTINE HEALING, SUBSEQUENT ENCOUNTER: Primary | ICD-10-CM

## 2021-04-01 PROCEDURE — 25010000002 ROMOSOZUMAB-AQQG 105 MG/1.17ML SOLUTION PREFILLED SYRINGE: Performed by: INTERNAL MEDICINE

## 2021-04-01 PROCEDURE — 96372 THER/PROPH/DIAG INJ SC/IM: CPT | Performed by: NURSE PRACTITIONER

## 2021-04-01 RX ADMIN — ROMOSOZUMAB-AQQG 210 MG: 105 INJECTION, SOLUTION SUBCUTANEOUS at 10:13

## 2021-04-12 ENCOUNTER — LAB (OUTPATIENT)
Dept: LAB | Facility: HOSPITAL | Age: 66
End: 2021-04-12

## 2021-04-12 LAB
25(OH)D3 SERPL-MCNC: 68.5 NG/ML
ALBUMIN SERPL-MCNC: 4 G/DL (ref 3.5–5.2)
ANION GAP SERPL CALCULATED.3IONS-SCNC: 13.5 MMOL/L (ref 5–15)
BASOPHILS # BLD AUTO: 0.05 10*3/MM3 (ref 0–0.2)
BASOPHILS NFR BLD AUTO: 0.7 % (ref 0–1.5)
BUN SERPL-MCNC: 15 MG/DL (ref 8–23)
BUN/CREAT SERPL: 20.8 (ref 7–25)
CALCIUM SPEC-SCNC: 9.6 MG/DL (ref 8.6–10.5)
CHLORIDE SERPL-SCNC: 100 MMOL/L (ref 98–107)
CHOLEST SERPL-MCNC: 139 MG/DL (ref 0–200)
CO2 SERPL-SCNC: 24.5 MMOL/L (ref 22–29)
CREAT SERPL-MCNC: 0.72 MG/DL (ref 0.57–1)
DEPRECATED RDW RBC AUTO: 41 FL (ref 37–54)
EOSINOPHIL # BLD AUTO: 0.26 10*3/MM3 (ref 0–0.4)
EOSINOPHIL NFR BLD AUTO: 3.6 % (ref 0.3–6.2)
ERYTHROCYTE [DISTWIDTH] IN BLOOD BY AUTOMATED COUNT: 12.6 % (ref 12.3–15.4)
GFR SERPL CREATININE-BSD FRML MDRD: 81 ML/MIN/1.73
GLUCOSE SERPL-MCNC: 203 MG/DL (ref 65–99)
HBA1C MFR BLD: 8.78 % (ref 4.8–5.6)
HCT VFR BLD AUTO: 39.3 % (ref 34–46.6)
HDLC SERPL-MCNC: 33 MG/DL (ref 40–60)
HGB BLD-MCNC: 13.1 G/DL (ref 12–15.9)
IMM GRANULOCYTES # BLD AUTO: 0.06 10*3/MM3 (ref 0–0.05)
IMM GRANULOCYTES NFR BLD AUTO: 0.8 % (ref 0–0.5)
LDLC SERPL CALC-MCNC: 60 MG/DL (ref 0–100)
LDLC/HDLC SERPL: 1.47 {RATIO}
LYMPHOCYTES # BLD AUTO: 1.78 10*3/MM3 (ref 0.7–3.1)
LYMPHOCYTES NFR BLD AUTO: 24.9 % (ref 19.6–45.3)
MCH RBC QN AUTO: 30.3 PG (ref 26.6–33)
MCHC RBC AUTO-ENTMCNC: 33.3 G/DL (ref 31.5–35.7)
MCV RBC AUTO: 90.8 FL (ref 79–97)
MONOCYTES # BLD AUTO: 0.49 10*3/MM3 (ref 0.1–0.9)
MONOCYTES NFR BLD AUTO: 6.8 % (ref 5–12)
NEUTROPHILS NFR BLD AUTO: 4.52 10*3/MM3 (ref 1.7–7)
NEUTROPHILS NFR BLD AUTO: 63.2 % (ref 42.7–76)
NRBC BLD AUTO-RTO: 0 /100 WBC (ref 0–0.2)
PHOSPHATE SERPL-MCNC: 3.2 MG/DL (ref 2.5–4.5)
PLATELET # BLD AUTO: 240 10*3/MM3 (ref 140–450)
PMV BLD AUTO: 11.6 FL (ref 6–12)
POTASSIUM SERPL-SCNC: 3.9 MMOL/L (ref 3.5–5.2)
RBC # BLD AUTO: 4.33 10*6/MM3 (ref 3.77–5.28)
SODIUM SERPL-SCNC: 138 MMOL/L (ref 136–145)
TRIGL SERPL-MCNC: 288 MG/DL (ref 0–150)
VLDLC SERPL-MCNC: 46 MG/DL (ref 5–40)
WBC # BLD AUTO: 7.16 10*3/MM3 (ref 3.4–10.8)

## 2021-04-12 PROCEDURE — 80069 RENAL FUNCTION PANEL: CPT | Performed by: INTERNAL MEDICINE

## 2021-04-12 PROCEDURE — 80061 LIPID PANEL: CPT | Performed by: INTERNAL MEDICINE

## 2021-04-12 PROCEDURE — 82306 VITAMIN D 25 HYDROXY: CPT | Performed by: INTERNAL MEDICINE

## 2021-04-12 PROCEDURE — 85025 COMPLETE CBC W/AUTO DIFF WBC: CPT | Performed by: INTERNAL MEDICINE

## 2021-04-12 PROCEDURE — 83036 HEMOGLOBIN GLYCOSYLATED A1C: CPT | Performed by: INTERNAL MEDICINE

## 2021-04-13 LAB
CALCIUM 24H UR-MCNC: 6.7 MG/DL
CALCIUM 24H UR-MRATE: 261.3 MG/24 HR (ref 100–300)
COLLECT DURATION TIME UR: 24 HRS
COLLECT DURATION TIME UR: 24 HRS
CREAT UR-MCNC: 33.3 MG/DL
CREATINE 24H UR-MRATE: 1.3 G/24 HR (ref 0.7–1.6)
SPECIMEN VOL 24H UR: 3900 ML
SPECIMEN VOL 24H UR: 3900 ML

## 2021-04-13 PROCEDURE — 82570 ASSAY OF URINE CREATININE: CPT | Performed by: INTERNAL MEDICINE

## 2021-04-13 PROCEDURE — 81050 URINALYSIS VOLUME MEASURE: CPT | Performed by: INTERNAL MEDICINE

## 2021-04-13 PROCEDURE — 82340 ASSAY OF CALCIUM IN URINE: CPT | Performed by: INTERNAL MEDICINE

## 2021-04-19 ENCOUNTER — OFFICE VISIT (OUTPATIENT)
Dept: ENDOCRINOLOGY | Facility: CLINIC | Age: 66
End: 2021-04-19

## 2021-04-19 VITALS
DIASTOLIC BLOOD PRESSURE: 84 MMHG | HEIGHT: 69 IN | WEIGHT: 215.5 LBS | BODY MASS INDEX: 31.92 KG/M2 | SYSTOLIC BLOOD PRESSURE: 138 MMHG | OXYGEN SATURATION: 97 % | HEART RATE: 82 BPM

## 2021-04-19 DIAGNOSIS — E55.9 VITAMIN D DEFICIENCY: ICD-10-CM

## 2021-04-19 DIAGNOSIS — R82.994 HYPERCALCIURIA: ICD-10-CM

## 2021-04-19 DIAGNOSIS — E53.8 B12 DEFICIENCY: ICD-10-CM

## 2021-04-19 DIAGNOSIS — M80.00XD OSTEOPOROSIS WITH PATHOLOGICAL FRACTURE WITH ROUTINE HEALING, SUBSEQUENT ENCOUNTER: ICD-10-CM

## 2021-04-19 DIAGNOSIS — E11.65 TYPE 2 DIABETES MELLITUS WITH HYPERGLYCEMIA, WITHOUT LONG-TERM CURRENT USE OF INSULIN (HCC): Primary | ICD-10-CM

## 2021-04-19 PROCEDURE — 99214 OFFICE O/P EST MOD 30 MIN: CPT | Performed by: INTERNAL MEDICINE

## 2021-04-19 RX ORDER — AMOXICILLIN AND CLAVULANATE POTASSIUM 875; 125 MG/1; MG/1
1 TABLET, FILM COATED ORAL 2 TIMES DAILY
COMMUNITY
End: 2021-07-27

## 2021-04-19 RX ORDER — PREDNISONE 10 MG/1
TABLET ORAL DAILY
COMMUNITY
End: 2021-08-19

## 2021-04-19 NOTE — PROGRESS NOTES
Rekha Dorado is a 65 y.o. female who presents for  evaluation of   Type 2 diabetes and osteoporosis      Referring provider     Primary Care Provider    Mariam Morgan MD    Very pleasant 65-year-old female sp right superior parathyroidectomy in April 2018 for primary hyperparathyroidism  Pathology confirmed that left parathyroids were identified, biopsied and left intact  The right inf was not found    Intraoperative PTH decreased from 74 to 8 intraoperatively     Complications, she has severe osteoporosis complicated by  wrist fracture.    After parathyroidectomy we started on prolia and bone density   decreased at the femoral neck despite treatment w prolia, calcium and vit D     Now on Evenity , started Dec 2020         Lab Results   Component Value Date    PTH 10.2 (L) 01/20/2021    CALCIUM 9.6 04/12/2021    PHOS 3.2 04/12/2021            ----------    She also has type 2 diabetes w loss of control   Lost control , her father was sick and passed away and during that time she was eating fast food    Past Medical History:   Diagnosis Date   • Acid reflux    • Arthritis    • Asthma    • Controlled type 2 diabetes mellitus without complication, without long-term current use of insulin (CMS/[facility]) 12/19/2017   • High blood triglycerides    • Hypertension    • Left thyroid nodule 12/19/2017   • Primary hyperparathyroidism (CMS/[facility]) 12/19/2017   • Severe osteopetrosis 12/19/2017   • Skin cancer    • Sleep apnea      Family History   Problem Relation Age of Onset   • Diabetes Mother      Social History     Tobacco Use   • Smoking status: Never Smoker   • Smokeless tobacco: Never Used   Substance Use Topics   • Alcohol use: No   • Drug use: No         Current Outpatient Medications:   •  amoxicillin-clavulanate (AUGMENTIN) 875-125 MG per tablet, Take 1 tablet by mouth 2 (Two) Times a Day., Disp: , Rfl:   •  aspirin 81 MG EC tablet, Take 81 mg by mouth Every Night., Disp: , Rfl:   •  budesonide-formoterol  (SYMBICORT) 160-4.5 MCG/ACT inhaler, Inhale 2 puffs 2 (Two) Times a Day., Disp: , Rfl:   •  carvedilol (COREG) 3.125 MG tablet, Take 3.125 mg by mouth 2 (Two) Times a Day With Meals., Disp: , Rfl:   •  Cholecalciferol (VITAMIN D) 2000 units tablet, Take 2,000 Units by mouth Daily., Disp: , Rfl:   •  choline fenofibrate (TRILIPIX) 135 MG capsule, Take 135 mg by mouth Every Night., Disp: , Rfl:   •  CRANBERRY EXTRACT PO, Take  by mouth 2 (Two) Times a Day. 2 tabs, Disp: , Rfl:   •  Cyanocobalamin (B-12) 500 MCG sublingual tablet, 500 mcgs under the tongue every other day, Disp: 15 tablet, Rfl: 11  •  dicyclomine (BENTYL) 10 MG capsule, Take 10 mg by mouth 3 (Three) Times a Day As Needed., Disp: , Rfl:   •  Dulaglutide 0.75 MG/0.5ML solution pen-injector, Inject 0.75 mg under the skin into the appropriate area as directed 1 (One) Time Per Week., Disp: 12 pen, Rfl: 3  •  fexofenadine (ALLEGRA) 60 MG tablet, Take 60 mg by mouth Daily., Disp: , Rfl:   •  glucosamine sulfate 500 MG capsule capsule, Take  by mouth 2 (Two) Times a Day. 1 tab in the morning and 2 tabs at night, Disp: , Rfl:   •  hydroCHLOROthiazide (HYDRODIURIL) 12.5 MG tablet, Take 1 tablet by mouth Daily., Disp: 30 tablet, Rfl: 11  •  KRILL OIL PO, Take  by mouth Every Night., Disp: , Rfl:   •  losartan (COZAAR) 50 MG tablet, Take 50 mg by mouth Daily., Disp: , Rfl:   •  metFORMIN ER (Glucophage XR) 500 MG 24 hr tablet, 1 tab w supper, ER only , please cancel IR, Disp: 90 tablet, Rfl: 3  •  montelukast (SINGULAIR) 10 MG tablet, Take 10 mg by mouth Every Night., Disp: , Rfl:   •  olopatadine (PATADAY) 0.2 % solution ophthalmic solution, Daily As Needed., Disp: , Rfl:   •  omeprazole (priLOSEC) 20 MG capsule, Take 20 mg by mouth Every Night., Disp: , Rfl:   •  ondansetron (Zofran) 4 MG tablet, Take 1 tablet by mouth Daily As Needed for Nausea or Vomiting., Disp: 30 tablet, Rfl: 11  •  predniSONE (DELTASONE) 10 MG (21) dose pack, Take  by mouth Daily. Use as  "directed on package, Disp: , Rfl:     Review of Systems    Review of Systems   Feels well , no concerns     Objective:   /84   Pulse 82   Ht 175.3 cm (69\")   Wt 97.8 kg (215 lb 8 oz)   SpO2 97%   BMI 31.82 kg/m²     Physical Exam   HENT:   Head: Normocephalic.   Pulmonary/Chest: Effort normal and breath sounds normal.   Abdominal: Normal appearance.   Musculoskeletal:      Right lower leg: No edema.      Left lower leg: No edema.   Neurological: She is alert.       Lab Review    Lab Results   Component Value Date    WBC 7.16 04/12/2021    HGB 13.1 04/12/2021    HCT 39.3 04/12/2021    MCV 90.8 04/12/2021     04/12/2021     Lab Results   Component Value Date    GLUCOSE 203 (H) 04/12/2021    BUN 15 04/12/2021    CREATININE 0.72 04/12/2021    EGFRIFNONA 81 04/12/2021    BCR 20.8 04/12/2021    K 3.9 04/12/2021    CO2 24.5 04/12/2021    CALCIUM 9.6 04/12/2021    ALBUMIN 4.00 04/12/2021    AST 18 12/03/2020    ALT 23 12/03/2020             Assessment/Plan       ICD-10-CM ICD-9-CM   1. Type 2 diabetes mellitus with hyperglycemia, without long-term current use of insulin (CMS/[facility])  E11.65 250.00     790.29   2. Osteoporosis with pathological fracture with routine healing, subsequent encounter  M80.00XD V54.29   3. Hypercalciuria  R82.994 275.40   4. B12 deficiency  E53.8 266.2   5. Vitamin D deficiency  E55.9 268.9           Type 2 diabetes          metformin 500 mg at night and  trulicity 0.75 mg weekly     Briefly spoke about raising trulicity or adding jardiance but we agreed on waiting          Lab Results   Component Value Date    HGBA1C 8.78 (H) 04/12/2021    HGBA1C 7.40 (H) 12/03/2020    HGBA1C 6.50 (H) 03/06/2020     Lab Results   Component Value Date    MICROALBUR <1.2 12/03/2020    CREATININE 0.72 04/12/2021       ===========    Osteoporosis      Primary hyperparathyroidism - cured by right sup parathyroidectomy in Feb 2018  Dr. Rodriguez correctly identified Left normal sup and inferior " parathyroids  Right inferior never identified   She was clearly cured after surgery     Component      Latest Ref Rng & Units 1/23/2018           8:58 AM   PTH Intact (Serial Monitor)      15.0 - 65.0 pg/mL 74.8 (H)   Calcium      8.6 - 10.5 mg/dL 12.9 (H)     Component      Latest Ref Rng & Units 6/4/2018  postop           8:07 AM   PTH Intact (Serial Monitor)      15.0 - 65.0 pg/mL 8.3 (L)   Calcium      8.6 - 10.5 mg/dL 10.2     Lab Results   Component Value Date    PTH 10.2 (L) 01/20/2021    CALCIUM 9.6 04/12/2021    PHOS 3.2 04/12/2021         Dec 2020     ruled out celiac dx       Ruled out Cushing's by LDDST    Component      Latest Ref Rng & Units 12/11/2020 12/14/2020 12/16/2020   ACTH      7.2 - 63.3 pg/mL 30.7  2.7 (L)   Cortisol - AM      mcg/dL 13.89  1.43       Hypercalciuria remained despite correction of Primary Hyperparathyroidism I start HCTZ and now 24 h urine calcium less than 300         2000 units of vitamin D and calcium 400 to 600 mg bid or dietary calcium       on prolia since 2018 but due to loss I changed to evenity      DXA Nov 2020  Stable at spine but declining at Femoral Neck , markers positioned correctly                 ----     EVENITY 220 MCGS MONTHLY     RATIONALE, SHE HAS OSTEOPOROSIS WITH WRIST FRACTURE,  BONE DENSITY DECLINING DESPITE THE USE OF PROLIA AS PROVED ABOVE   BASED ON AACE GUIDELINES, WE HAVE A. FRACTURE, B. FAILED TREATMENT  SHE DOESN'T HAVE HISTORY OF STROKE OR MI     WE CAN'T USE TERIPARATIDE SINCE SHE HAS HISTORY OF PRIMARY HYPERPARATHYROIDISM     BESIDES IN A HEAD TO HEAD OF EVENITY VS TERIPARATIDE , BONE DENSITY GAIN WAS SUPERIOR W EVENITY     --    Diffuse goiter, nl TSH    No need for repeat US , just follow TSH     Lab Results   Component Value Date    TSH 1.780 12/03/2020    TSH 2.400 03/06/2020    TSH 1.710 04/25/2019       Thyroid Antibodies  TPO AB  Lab Results   Component Value Date    THYROIDAB 7 12/04/2018       ==    Anemia    Will have colonoscopy      ==    Low Magnesium     Start Mg Oxide 200 mg daily - doing OTC    Now nl magnesium     ==    Low b12 on IM injection - [facility]     Needs to take  Sublingual        A copy of my note was sent to Mariam Morgan MD    Please see my above opinion and suggestions.

## 2021-05-03 ENCOUNTER — INFUSION (OUTPATIENT)
Dept: ONCOLOGY | Facility: HOSPITAL | Age: 66
End: 2021-05-03

## 2021-05-03 VITALS — HEART RATE: 72 BPM | SYSTOLIC BLOOD PRESSURE: 127 MMHG | DIASTOLIC BLOOD PRESSURE: 55 MMHG | RESPIRATION RATE: 18 BRPM

## 2021-05-03 DIAGNOSIS — M80.00XD OSTEOPOROSIS WITH PATHOLOGICAL FRACTURE WITH ROUTINE HEALING, SUBSEQUENT ENCOUNTER: Primary | ICD-10-CM

## 2021-05-03 PROCEDURE — 25010000002 ROMOSOZUMAB-AQQG 105 MG/1.17ML SOLUTION PREFILLED SYRINGE: Performed by: INTERNAL MEDICINE

## 2021-05-03 PROCEDURE — 96372 THER/PROPH/DIAG INJ SC/IM: CPT | Performed by: NURSE PRACTITIONER

## 2021-05-03 RX ADMIN — ROMOSOZUMAB-AQQG 210 MG: 105 INJECTION, SOLUTION SUBCUTANEOUS at 10:12

## 2021-05-12 ENCOUNTER — APPOINTMENT (OUTPATIENT)
Dept: ONCOLOGY | Facility: HOSPITAL | Age: 66
End: 2021-05-12

## 2021-05-17 ENCOUNTER — APPOINTMENT (OUTPATIENT)
Dept: ONCOLOGY | Facility: HOSPITAL | Age: 66
End: 2021-05-17

## 2021-06-01 ENCOUNTER — INFUSION (OUTPATIENT)
Dept: ONCOLOGY | Facility: HOSPITAL | Age: 66
End: 2021-06-01

## 2021-06-01 VITALS
HEART RATE: 75 BPM | DIASTOLIC BLOOD PRESSURE: 58 MMHG | SYSTOLIC BLOOD PRESSURE: 131 MMHG | RESPIRATION RATE: 18 BRPM | TEMPERATURE: 96.3 F

## 2021-06-01 DIAGNOSIS — M80.00XD OSTEOPOROSIS WITH PATHOLOGICAL FRACTURE WITH ROUTINE HEALING, SUBSEQUENT ENCOUNTER: Primary | ICD-10-CM

## 2021-06-01 PROCEDURE — 96372 THER/PROPH/DIAG INJ SC/IM: CPT | Performed by: NURSE PRACTITIONER

## 2021-06-01 PROCEDURE — 25010000002 ROMOSOZUMAB-AQQG 105 MG/1.17ML SOLUTION PREFILLED SYRINGE: Performed by: INTERNAL MEDICINE

## 2021-06-01 RX ADMIN — ROMOSOZUMAB-AQQG 210 MG: 105 INJECTION, SOLUTION SUBCUTANEOUS at 10:21

## 2021-06-29 ENCOUNTER — INFUSION (OUTPATIENT)
Dept: ONCOLOGY | Facility: HOSPITAL | Age: 66
End: 2021-06-29

## 2021-06-29 VITALS — DIASTOLIC BLOOD PRESSURE: 69 MMHG | TEMPERATURE: 97 F | SYSTOLIC BLOOD PRESSURE: 137 MMHG | HEART RATE: 80 BPM

## 2021-06-29 DIAGNOSIS — M80.00XD OSTEOPOROSIS WITH PATHOLOGICAL FRACTURE WITH ROUTINE HEALING, SUBSEQUENT ENCOUNTER: Primary | ICD-10-CM

## 2021-06-29 PROCEDURE — 96372 THER/PROPH/DIAG INJ SC/IM: CPT | Performed by: NURSE PRACTITIONER

## 2021-06-29 PROCEDURE — 25010000002 ROMOSOZUMAB-AQQG 105 MG/1.17ML SOLUTION PREFILLED SYRINGE: Performed by: INTERNAL MEDICINE

## 2021-06-29 RX ADMIN — ROMOSOZUMAB-AQQG 210 MG: 105 INJECTION, SOLUTION SUBCUTANEOUS at 09:37

## 2021-07-27 ENCOUNTER — INFUSION (OUTPATIENT)
Dept: ONCOLOGY | Facility: HOSPITAL | Age: 66
End: 2021-07-27

## 2021-07-27 VITALS
TEMPERATURE: 96.8 F | HEART RATE: 78 BPM | DIASTOLIC BLOOD PRESSURE: 78 MMHG | RESPIRATION RATE: 20 BRPM | SYSTOLIC BLOOD PRESSURE: 154 MMHG

## 2021-07-27 DIAGNOSIS — M80.00XD OSTEOPOROSIS WITH PATHOLOGICAL FRACTURE WITH ROUTINE HEALING, SUBSEQUENT ENCOUNTER: Primary | ICD-10-CM

## 2021-07-27 PROCEDURE — 25010000002 ROMOSOZUMAB-AQQG 105 MG/1.17ML SOLUTION PREFILLED SYRINGE

## 2021-07-27 PROCEDURE — 96372 THER/PROPH/DIAG INJ SC/IM: CPT | Performed by: NURSE PRACTITIONER

## 2021-07-27 PROCEDURE — 25010000002 ROMOSOZUMAB-AQQG 105 MG/1.17ML SOLUTION PREFILLED SYRINGE: Performed by: INTERNAL MEDICINE

## 2021-07-27 RX ADMIN — ROMOSOZUMAB-AQQG 210 MG: 105 INJECTION, SOLUTION SUBCUTANEOUS at 11:15

## 2021-08-12 ENCOUNTER — LAB (OUTPATIENT)
Dept: LAB | Facility: HOSPITAL | Age: 66
End: 2021-08-12

## 2021-08-12 LAB
25(OH)D3 SERPL-MCNC: 55.8 NG/ML
ALBUMIN SERPL-MCNC: 4 G/DL (ref 3.5–5.2)
ANION GAP SERPL CALCULATED.3IONS-SCNC: 15.7 MMOL/L (ref 5–15)
BASOPHILS # BLD AUTO: 0.04 10*3/MM3 (ref 0–0.2)
BASOPHILS NFR BLD AUTO: 0.6 % (ref 0–1.5)
BUN SERPL-MCNC: 13 MG/DL (ref 8–23)
BUN/CREAT SERPL: 18.6 (ref 7–25)
CALCIUM SPEC-SCNC: 9.9 MG/DL (ref 8.6–10.5)
CHLORIDE SERPL-SCNC: 98 MMOL/L (ref 98–107)
CHOLEST SERPL-MCNC: 146 MG/DL (ref 0–200)
CO2 SERPL-SCNC: 22.3 MMOL/L (ref 22–29)
CREAT SERPL-MCNC: 0.7 MG/DL (ref 0.57–1)
DEPRECATED RDW RBC AUTO: 43.7 FL (ref 37–54)
EOSINOPHIL # BLD AUTO: 0.3 10*3/MM3 (ref 0–0.4)
EOSINOPHIL NFR BLD AUTO: 4.6 % (ref 0.3–6.2)
ERYTHROCYTE [DISTWIDTH] IN BLOOD BY AUTOMATED COUNT: 14 % (ref 12.3–15.4)
GFR SERPL CREATININE-BSD FRML MDRD: 83 ML/MIN/1.73
GLUCOSE SERPL-MCNC: 195 MG/DL (ref 65–99)
HBA1C MFR BLD: 8.7 % (ref 4.8–5.6)
HCT VFR BLD AUTO: 37.1 % (ref 34–46.6)
HDLC SERPL-MCNC: 30 MG/DL (ref 40–60)
HGB BLD-MCNC: 12.6 G/DL (ref 12–15.9)
IMM GRANULOCYTES # BLD AUTO: 0.04 10*3/MM3 (ref 0–0.05)
IMM GRANULOCYTES NFR BLD AUTO: 0.6 % (ref 0–0.5)
LDLC SERPL CALC-MCNC: 66 MG/DL (ref 0–100)
LDLC/HDLC SERPL: 1.75 {RATIO}
LYMPHOCYTES # BLD AUTO: 1.62 10*3/MM3 (ref 0.7–3.1)
LYMPHOCYTES NFR BLD AUTO: 24.8 % (ref 19.6–45.3)
MCH RBC QN AUTO: 29.8 PG (ref 26.6–33)
MCHC RBC AUTO-ENTMCNC: 34 G/DL (ref 31.5–35.7)
MCV RBC AUTO: 87.7 FL (ref 79–97)
MONOCYTES # BLD AUTO: 0.37 10*3/MM3 (ref 0.1–0.9)
MONOCYTES NFR BLD AUTO: 5.7 % (ref 5–12)
NEUTROPHILS NFR BLD AUTO: 4.16 10*3/MM3 (ref 1.7–7)
NEUTROPHILS NFR BLD AUTO: 63.7 % (ref 42.7–76)
NRBC BLD AUTO-RTO: 0 /100 WBC (ref 0–0.2)
PHOSPHATE SERPL-MCNC: 3.4 MG/DL (ref 2.5–4.5)
PLATELET # BLD AUTO: 259 10*3/MM3 (ref 140–450)
PMV BLD AUTO: 11.8 FL (ref 6–12)
POTASSIUM SERPL-SCNC: 4 MMOL/L (ref 3.5–5.2)
RBC # BLD AUTO: 4.23 10*6/MM3 (ref 3.77–5.28)
SODIUM SERPL-SCNC: 136 MMOL/L (ref 136–145)
TRIGL SERPL-MCNC: 317 MG/DL (ref 0–150)
VLDLC SERPL-MCNC: 50 MG/DL (ref 5–40)
WBC # BLD AUTO: 6.53 10*3/MM3 (ref 3.4–10.8)

## 2021-08-12 PROCEDURE — 83036 HEMOGLOBIN GLYCOSYLATED A1C: CPT | Performed by: INTERNAL MEDICINE

## 2021-08-12 PROCEDURE — 80061 LIPID PANEL: CPT | Performed by: INTERNAL MEDICINE

## 2021-08-12 PROCEDURE — 82306 VITAMIN D 25 HYDROXY: CPT | Performed by: INTERNAL MEDICINE

## 2021-08-12 PROCEDURE — 80069 RENAL FUNCTION PANEL: CPT | Performed by: INTERNAL MEDICINE

## 2021-08-12 PROCEDURE — 85025 COMPLETE CBC W/AUTO DIFF WBC: CPT | Performed by: INTERNAL MEDICINE

## 2021-08-19 ENCOUNTER — OFFICE VISIT (OUTPATIENT)
Dept: ENDOCRINOLOGY | Facility: CLINIC | Age: 66
End: 2021-08-19

## 2021-08-19 VITALS
SYSTOLIC BLOOD PRESSURE: 110 MMHG | OXYGEN SATURATION: 98 % | WEIGHT: 211 LBS | HEART RATE: 78 BPM | BODY MASS INDEX: 30.21 KG/M2 | DIASTOLIC BLOOD PRESSURE: 60 MMHG | HEIGHT: 70 IN

## 2021-08-19 DIAGNOSIS — R82.994 HYPERCALCIURIA: ICD-10-CM

## 2021-08-19 DIAGNOSIS — E53.8 B12 DEFICIENCY: ICD-10-CM

## 2021-08-19 DIAGNOSIS — E88.1 LIPODYSTROPHY: ICD-10-CM

## 2021-08-19 DIAGNOSIS — M80.00XS AGE-RELATED OSTEOPOROSIS WITH CURRENT PATHOLOGICAL FRACTURE, SEQUELA: ICD-10-CM

## 2021-08-19 DIAGNOSIS — E55.9 VITAMIN D DEFICIENCY: ICD-10-CM

## 2021-08-19 DIAGNOSIS — E21.3 HYPERPARATHYROIDISM, UNSPECIFIED (HCC): ICD-10-CM

## 2021-08-19 DIAGNOSIS — M80.00XD OSTEOPOROSIS WITH PATHOLOGICAL FRACTURE WITH ROUTINE HEALING, SUBSEQUENT ENCOUNTER: ICD-10-CM

## 2021-08-19 DIAGNOSIS — E11.65 TYPE 2 DIABETES MELLITUS WITH HYPERGLYCEMIA, WITHOUT LONG-TERM CURRENT USE OF INSULIN (HCC): Primary | ICD-10-CM

## 2021-08-19 PROCEDURE — 99214 OFFICE O/P EST MOD 30 MIN: CPT | Performed by: INTERNAL MEDICINE

## 2021-08-19 RX ORDER — INSULIN HUMAN 4 1/1
4 POWDER, METERED RESPIRATORY (INHALATION)
Qty: 90 EACH | Refills: 11 | Status: SHIPPED | OUTPATIENT
Start: 2021-08-19 | End: 2021-08-19

## 2021-08-19 RX ORDER — INSULIN GLARGINE 100 [IU]/ML
INJECTION, SOLUTION SUBCUTANEOUS
Qty: 6 PEN | Refills: 3 | Status: SHIPPED | OUTPATIENT
Start: 2021-08-19 | End: 2021-12-20

## 2021-08-19 RX ORDER — PEN NEEDLE, DIABETIC 30 GX3/16"
1 NEEDLE, DISPOSABLE MISCELLANEOUS DAILY
Qty: 90 EACH | Refills: 11 | Status: SHIPPED | OUTPATIENT
Start: 2021-08-19 | End: 2022-07-31 | Stop reason: SDUPTHER

## 2021-08-19 NOTE — PROGRESS NOTES
"Rekha Dorado is a 66 y.o. female who presents for  evaluation of   Type 2 diabetes and osteoporosis    HPI       Very pleasant 65-year-old female sp right superior parathyroidectomy in April 2018 for primary hyperparathyroidism  Pathology confirmed that left parathyroids were identified, biopsied and left intact  The right inf was not found    Intraoperative PTH decreased from 74 to 8 intraoperatively     Complications, she has severe osteoporosis complicated by  wrist fracture.    After parathyroidectomy we started on prolia and bone density   decreased at the femoral neck despite treatment w prolia, calcium and vit D     Now on Evenity , started Dec 2020         Lab Results   Component Value Date    PTH 10.2 (L) 01/20/2021    CALCIUM 9.9 08/12/2021    PHOS 3.4 08/12/2021            ----------    She also has type 2 diabetes w loss of control        Review of Systems   Feels well , no concerns     Objective:   /60   Pulse 78   Ht 177.8 cm (70\")   Wt 95.7 kg (211 lb)   SpO2 98%   BMI 30.28 kg/m²     Physical Exam   HENT:   Head: Normocephalic.   Pulmonary/Chest: Effort normal and breath sounds normal.   Abdominal: Normal appearance.   Musculoskeletal:      Right lower leg: No edema.      Left lower leg: No edema.   Neurological: She is alert.       Lab Review    Lab Results   Component Value Date    WBC 6.53 08/12/2021    HGB 12.6 08/12/2021    HCT 37.1 08/12/2021    MCV 87.7 08/12/2021     08/12/2021     Lab Results   Component Value Date    GLUCOSE 195 (H) 08/12/2021    BUN 13 08/12/2021    CREATININE 0.70 08/12/2021    EGFRIFNONA 83 08/12/2021    BCR 18.6 08/12/2021    K 4.0 08/12/2021    CO2 22.3 08/12/2021    CALCIUM 9.9 08/12/2021    ALBUMIN 4.00 08/12/2021    AST 18 12/03/2020    ALT 23 12/03/2020             Assessment/Plan       ICD-10-CM ICD-9-CM   1. Type 2 diabetes mellitus with hyperglycemia, without long-term current use of insulin (CMS/[facility])  E11.65 250.00     790.29 "   2. Osteoporosis with pathological fracture with routine healing, subsequent encounter  M80.00XD V54.29   3. Hypercalciuria  R82.994 275.40   4. B12 deficiency  E53.8 266.2   5. Vitamin D deficiency  E55.9 268.9   6. Age-related osteoporosis with current pathological fracture, sequela  M80.00XS 905.5     733.01   7. Lipodystrophy  E88.1 272.6           Type 2 diabetes             Lab Results   Component Value Date    HGBA1C 8.70 (H) 08/12/2021    HGBA1C 8.78 (H) 04/12/2021    HGBA1C 7.40 (H) 12/03/2020     Lab Results   Component Value Date    MICROALBUR <1.2 12/03/2020    CREATININE 0.70 08/12/2021         metformin 500 mg at night as much as she can take   trulicity 0.75 mg weekly - 1.5 mg weekly    Add lantus 10 units qhs    afrezza 4-32 units TID , max dose 96 units per day     Has lipohypertrophy     Criteria for Approval of munira    The patient has diabetes mellitus, insulin-dependent.    Our Diabetes Department has evaluated the patient in the last six months and will continue counseling on insulin adjustment.     The patient performs blood glucose testing at least four times daily with proven glucose variability from 50 to 300 mg per dl.    The patient is administering basal insulin and prandial insulin four times per day for more than six months.    The patient uses a home blood glucose monitor to assess blood glucose at least four times daily for more than six months.    The patient requires frequent adjustment of insulin treatment regimen based on blood glucose readings.    The patient has frequent variability in blood glucose readings due to activity and variability in meal content and time.     The patient has completed a diabetes education program with us.    The patient has demonstrated the ability to self-monitor her glucose.     The patient is motivated in improving diabetes control           Lipids    LDL normal  Tg elevated , vascepa   ===========    Osteoporosis      Primary hyperparathyroidism  - cured by right sup parathyroidectomy in Feb 2018  Dr. Rodriguez correctly identified Left normal sup and inferior parathyroids  Right inferior never identified   She was clearly cured after surgery     Component      Latest Ref Rng & Units 1/23/2018           8:58 AM   PTH Intact (Serial Monitor)      15.0 - 65.0 pg/mL 74.8 (H)   Calcium      8.6 - 10.5 mg/dL 12.9 (H)     Component      Latest Ref Rng & Units 6/4/2018  postop           8:07 AM   PTH Intact (Serial Monitor)      15.0 - 65.0 pg/mL 8.3 (L)   Calcium      8.6 - 10.5 mg/dL 10.2     Lab Results   Component Value Date    PTH 10.2 (L) 01/20/2021    CALCIUM 9.9 08/12/2021    PHOS 3.4 08/12/2021         Dec 2020     ruled out celiac dx       Ruled out Cushing's by LDDST    Component      Latest Ref Rng & Units 12/11/2020 12/14/2020 12/16/2020   ACTH      7.2 - 63.3 pg/mL 30.7  2.7 (L)   Cortisol - AM      mcg/dL 13.89  1.43       Hypercalciuria remained despite correction of Primary Hyperparathyroidism I start HCTZ and now 24 h urine calcium less than 300         2000 units of vitamin D and calcium 400 to 600 mg bid or dietary calcium       on prolia since 2018 but due to loss I changed to evenity      DXA Nov 2020  Stable at spine but declining at Femoral Neck , markers positioned correctly                 ----     EVENITY 220 MCGS MONTHLY     RATIONALE, SHE HAS OSTEOPOROSIS WITH WRIST FRACTURE,  BONE DENSITY DECLINING DESPITE THE USE OF PROLIA AS PROVED ABOVE   BASED ON AACE GUIDELINES, WE HAVE A. FRACTURE, B. FAILED TREATMENT  SHE DOESN'T HAVE HISTORY OF STROKE OR MI     WE CAN'T USE TERIPARATIDE SINCE SHE HAS HISTORY OF PRIMARY HYPERPARATHYROIDISM     BESIDES IN A HEAD TO HEAD OF EVENITY VS TERIPARATIDE , BONE DENSITY GAIN WAS SUPERIOR W EVENITY     --    Diffuse goiter, nl TSH    No need for repeat US , just follow TSH     Lab Results   Component Value Date    TSH 1.780 12/03/2020    TSH 2.400 03/06/2020    TSH 1.710 04/25/2019       Thyroid Antibodies  TPO  AB  Lab Results   Component Value Date    THYROIDAB 7 2018       ==    Anemia    Will have colonoscopy     ==    Low Magnesium     Start Mg Oxide 200 mg daily - doing OTC    Now nl magnesium     ==    Low b12 on IM injection - [facility]     Needs to take  Sublingual        A copy of my note was sent to Mariam Morgan MD    Please see my above opinion and suggestions.      Patient Demographics    Patient Name   Rekha Dorado Legal Sex   Female    1955 [address]    Address   [address] [place] [address] Phone   [phone] (Home)   [phone] (Mobile)   Emergency Contact(s)    Name Relation Home Work Mobile   Dioni Brantley [phone]     PCP and Center    Primary Care Provider Phone Center   Mariam Morgan -[phone] [facility]   Patient Employment    Status   Retired   Active Insurance as of 2021    [employer] MEDICARE REPLACEMENT - UNITED HE[employer] MEDICARE REPLACEMENT    Payor Plan Insurance Group Employer/Plan Group   [employer] MEDICARE REPLACEMENT UNITED HEALTHCARE MEDICARE REPLACEMENT [id number]    Payor Plan Address Payor Plan Phone Number Payor Plan Fax Number Effective Dates   [address] [address]   2018 - None Entered   [facility] [id number]      Subscriber Name Subscriber Birth Date Member ID    REKHA DORADO 1955 [id number]    Current Medications    aspirin 81 MG EC tablet   budesonide-formoterol (SYMBICORT) 160-4.5 MCG/ACT inhaler   carvedilol (COREG) 3.125 MG tablet   Cholecalciferol (VITAMIN D) 2000 units tablet   choline fenofibrate (TRILIPIX) 135 MG capsule   CRANBERRY EXTRACT PO   Cyanocobalamin (B-12) 500 MCG sublingual tablet   dicyclomine (BENTYL) 10 MG capsule   Dulaglutide 0.75 MG/0.5ML solution pen-injector   fexofenadine (ALLEGRA) 60 MG tablet   glucosamine sulfate 500 MG capsule capsule   hydroCHLOROthiazide (HYDRODIURIL) 12.5 MG tablet   KRILL OIL PO   losartan (COZAAR) 50 MG  tablet   metFORMIN ER (Glucophage XR) 500 MG 24 hr tablet   montelukast (SINGULAIR) 10 MG tablet   olopatadine (PATADAY) 0.2 % solution ophthalmic solution   omeprazole (priLOSEC) 20 MG capsule   ondansetron (Zofran) 4 MG tablet   Insulin Glargine (Lantus SoloStar) 100 UNIT/ML injection pen   Insulin Pen Needle (Pen Needles) 32G X 4 MM misc   Insulin Regular Human 4 & 8 & 12 units powder   Insulin Regular Human (Afrezza) 4 units powder (Discontinued)   predniSONE (DELTASONE) 10 MG (21) dose pack (Discontinued)   Allergies    No Known Allergies  Future Appointments     Provider Department Center   8/31/2021 3:30 PM [facility] OP INFU CHAIR 15 [facility] OP INFUSION [facility]   12/20/2021 9:45 AM Maxi Rios MD [facility] MEDICAL GROUP ENDOCRINOLOGY [facility]

## 2021-08-20 ENCOUNTER — DOCUMENTATION (OUTPATIENT)
Dept: ENDOCRINOLOGY | Facility: CLINIC | Age: 66
End: 2021-08-20

## 2021-08-24 ENCOUNTER — APPOINTMENT (OUTPATIENT)
Dept: ONCOLOGY | Facility: HOSPITAL | Age: 66
End: 2021-08-24

## 2021-08-26 ENCOUNTER — APPOINTMENT (OUTPATIENT)
Dept: ONCOLOGY | Facility: HOSPITAL | Age: 66
End: 2021-08-26

## 2021-08-31 ENCOUNTER — INFUSION (OUTPATIENT)
Dept: ONCOLOGY | Facility: HOSPITAL | Age: 66
End: 2021-08-31

## 2021-08-31 VITALS — SYSTOLIC BLOOD PRESSURE: 141 MMHG | DIASTOLIC BLOOD PRESSURE: 65 MMHG | HEART RATE: 90 BPM | TEMPERATURE: 97.3 F

## 2021-08-31 DIAGNOSIS — M80.00XD OSTEOPOROSIS WITH PATHOLOGICAL FRACTURE WITH ROUTINE HEALING, SUBSEQUENT ENCOUNTER: Primary | ICD-10-CM

## 2021-08-31 PROCEDURE — 25010000002 ROMOSOZUMAB-AQQG 105 MG/1.17ML SOLUTION PREFILLED SYRINGE: Performed by: INTERNAL MEDICINE

## 2021-08-31 PROCEDURE — 96372 THER/PROPH/DIAG INJ SC/IM: CPT | Performed by: NURSE PRACTITIONER

## 2021-08-31 RX ADMIN — ROMOSOZUMAB-AQQG 210 MG: 105 INJECTION, SOLUTION SUBCUTANEOUS at 14:59

## 2021-10-01 ENCOUNTER — INFUSION (OUTPATIENT)
Dept: ONCOLOGY | Facility: HOSPITAL | Age: 66
End: 2021-10-01

## 2021-10-01 VITALS — TEMPERATURE: 97 F | DIASTOLIC BLOOD PRESSURE: 51 MMHG | HEART RATE: 65 BPM | SYSTOLIC BLOOD PRESSURE: 123 MMHG

## 2021-10-01 DIAGNOSIS — M80.00XD OSTEOPOROSIS WITH PATHOLOGICAL FRACTURE WITH ROUTINE HEALING, SUBSEQUENT ENCOUNTER: Primary | ICD-10-CM

## 2021-10-01 PROCEDURE — 25010000002 ROMOSOZUMAB-AQQG 105 MG/1.17ML SOLUTION PREFILLED SYRINGE: Performed by: INTERNAL MEDICINE

## 2021-10-01 PROCEDURE — 96372 THER/PROPH/DIAG INJ SC/IM: CPT | Performed by: NURSE PRACTITIONER

## 2021-10-01 RX ADMIN — ROMOSOZUMAB-AQQG 210 MG: 105 INJECTION, SOLUTION SUBCUTANEOUS at 09:35

## 2021-11-02 ENCOUNTER — INFUSION (OUTPATIENT)
Dept: ONCOLOGY | Facility: HOSPITAL | Age: 66
End: 2021-11-02

## 2021-11-02 VITALS
TEMPERATURE: 96.9 F | SYSTOLIC BLOOD PRESSURE: 122 MMHG | DIASTOLIC BLOOD PRESSURE: 54 MMHG | RESPIRATION RATE: 20 BRPM | HEART RATE: 66 BPM

## 2021-11-02 DIAGNOSIS — M80.00XD OSTEOPOROSIS WITH PATHOLOGICAL FRACTURE WITH ROUTINE HEALING, SUBSEQUENT ENCOUNTER: Primary | ICD-10-CM

## 2021-11-02 PROCEDURE — 25010000002 ROMOSOZUMAB-AQQG 105 MG/1.17ML SOLUTION PREFILLED SYRINGE: Performed by: INTERNAL MEDICINE

## 2021-11-02 PROCEDURE — 96372 THER/PROPH/DIAG INJ SC/IM: CPT | Performed by: NURSE PRACTITIONER

## 2021-11-02 RX ADMIN — ROMOSOZUMAB-AQQG 210 MG: 105 INJECTION, SOLUTION SUBCUTANEOUS at 09:49

## 2021-11-09 ENCOUNTER — DOCUMENTATION (OUTPATIENT)
Dept: ENDOCRINOLOGY | Facility: CLINIC | Age: 66
End: 2021-11-09

## 2021-12-01 ENCOUNTER — APPOINTMENT (OUTPATIENT)
Dept: ONCOLOGY | Facility: HOSPITAL | Age: 66
End: 2021-12-01

## 2021-12-02 ENCOUNTER — INFUSION (OUTPATIENT)
Dept: ONCOLOGY | Facility: HOSPITAL | Age: 66
End: 2021-12-02

## 2021-12-02 VITALS
TEMPERATURE: 96.7 F | SYSTOLIC BLOOD PRESSURE: 133 MMHG | RESPIRATION RATE: 18 BRPM | HEART RATE: 81 BPM | DIASTOLIC BLOOD PRESSURE: 69 MMHG

## 2021-12-02 DIAGNOSIS — M80.00XD OSTEOPOROSIS WITH PATHOLOGICAL FRACTURE WITH ROUTINE HEALING, SUBSEQUENT ENCOUNTER: Primary | ICD-10-CM

## 2021-12-02 PROCEDURE — 96372 THER/PROPH/DIAG INJ SC/IM: CPT | Performed by: NURSE PRACTITIONER

## 2021-12-02 PROCEDURE — 25010000002 ROMOSOZUMAB-AQQG 105 MG/1.17ML SOLUTION PREFILLED SYRINGE: Performed by: INTERNAL MEDICINE

## 2021-12-02 RX ADMIN — ROMOSOZUMAB-AQQG 210 MG: 105 INJECTION, SOLUTION SUBCUTANEOUS at 09:43

## 2021-12-06 RX ORDER — HYDROCHLOROTHIAZIDE 12.5 MG/1
TABLET ORAL
Qty: 90 TABLET | Refills: 0 | Status: SHIPPED | OUTPATIENT
Start: 2021-12-06 | End: 2022-02-28 | Stop reason: SDUPTHER

## 2021-12-13 ENCOUNTER — LAB (OUTPATIENT)
Dept: LAB | Facility: HOSPITAL | Age: 66
End: 2021-12-13

## 2021-12-13 ENCOUNTER — TELEPHONE (OUTPATIENT)
Dept: ENDOCRINOLOGY | Facility: CLINIC | Age: 66
End: 2021-12-13

## 2021-12-13 DIAGNOSIS — E53.8 B12 DEFICIENCY: ICD-10-CM

## 2021-12-13 DIAGNOSIS — E11.65 TYPE 2 DIABETES MELLITUS WITH HYPERGLYCEMIA, WITHOUT LONG-TERM CURRENT USE OF INSULIN (HCC): Primary | ICD-10-CM

## 2021-12-13 DIAGNOSIS — E55.9 VITAMIN D DEFICIENCY: ICD-10-CM

## 2021-12-13 DIAGNOSIS — E21.3 HYPERPARATHYROIDISM, UNSPECIFIED (HCC): ICD-10-CM

## 2021-12-14 ENCOUNTER — LAB (OUTPATIENT)
Dept: LAB | Facility: HOSPITAL | Age: 66
End: 2021-12-14

## 2021-12-14 DIAGNOSIS — E53.8 B12 DEFICIENCY: ICD-10-CM

## 2021-12-14 DIAGNOSIS — E21.3 HYPERPARATHYROIDISM, UNSPECIFIED (HCC): ICD-10-CM

## 2021-12-14 DIAGNOSIS — E11.65 TYPE 2 DIABETES MELLITUS WITH HYPERGLYCEMIA, WITHOUT LONG-TERM CURRENT USE OF INSULIN (HCC): ICD-10-CM

## 2021-12-14 DIAGNOSIS — E55.9 VITAMIN D DEFICIENCY: ICD-10-CM

## 2021-12-14 LAB
25(OH)D3 SERPL-MCNC: 64.6 NG/ML
ALBUMIN SERPL-MCNC: 4.3 G/DL (ref 3.5–5.2)
ALBUMIN UR-MCNC: <1.2 MG/DL
ALBUMIN/GLOB SERPL: 1.3 G/DL
ALP SERPL-CCNC: 114 U/L (ref 39–117)
ALT SERPL W P-5'-P-CCNC: 26 U/L (ref 1–33)
ANION GAP SERPL CALCULATED.3IONS-SCNC: 9 MMOL/L (ref 5–15)
AST SERPL-CCNC: 29 U/L (ref 1–32)
BASOPHILS # BLD AUTO: 0.07 10*3/MM3 (ref 0–0.2)
BASOPHILS NFR BLD AUTO: 1.2 % (ref 0–1.5)
BILIRUB SERPL-MCNC: 0.2 MG/DL (ref 0–1.2)
BUN SERPL-MCNC: 15 MG/DL (ref 8–23)
BUN/CREAT SERPL: 15.3 (ref 7–25)
CALCIUM SPEC-SCNC: 11.6 MG/DL (ref 8.6–10.5)
CHLORIDE SERPL-SCNC: 97 MMOL/L (ref 98–107)
CHOLEST SERPL-MCNC: 151 MG/DL (ref 0–200)
CO2 SERPL-SCNC: 29 MMOL/L (ref 22–29)
CREAT SERPL-MCNC: 0.98 MG/DL (ref 0.57–1)
CREAT UR-MCNC: 90.9 MG/DL
DEPRECATED RDW RBC AUTO: 40.8 FL (ref 37–54)
EOSINOPHIL # BLD AUTO: 0.36 10*3/MM3 (ref 0–0.4)
EOSINOPHIL NFR BLD AUTO: 6.3 % (ref 0.3–6.2)
ERYTHROCYTE [DISTWIDTH] IN BLOOD BY AUTOMATED COUNT: 12.5 % (ref 12.3–15.4)
GFR SERPL CREATININE-BSD FRML MDRD: 56 ML/MIN/1.73
GLOBULIN UR ELPH-MCNC: 3.2 GM/DL
GLUCOSE SERPL-MCNC: 156 MG/DL (ref 65–99)
HBA1C MFR BLD: 8.04 % (ref 4.8–5.6)
HCT VFR BLD AUTO: 37.5 % (ref 34–46.6)
HDLC SERPL-MCNC: 28 MG/DL (ref 40–60)
HGB BLD-MCNC: 12.9 G/DL (ref 12–15.9)
IMM GRANULOCYTES # BLD AUTO: 0.02 10*3/MM3 (ref 0–0.05)
IMM GRANULOCYTES NFR BLD AUTO: 0.4 % (ref 0–0.5)
LDLC SERPL CALC-MCNC: 81 MG/DL (ref 0–100)
LDLC/HDLC SERPL: 2.61 {RATIO}
LYMPHOCYTES # BLD AUTO: 1.67 10*3/MM3 (ref 0.7–3.1)
LYMPHOCYTES NFR BLD AUTO: 29.3 % (ref 19.6–45.3)
MCH RBC QN AUTO: 31.2 PG (ref 26.6–33)
MCHC RBC AUTO-ENTMCNC: 34.4 G/DL (ref 31.5–35.7)
MCV RBC AUTO: 90.6 FL (ref 79–97)
MICROALBUMIN/CREAT UR: NORMAL MG/G{CREAT}
MONOCYTES # BLD AUTO: 0.41 10*3/MM3 (ref 0.1–0.9)
MONOCYTES NFR BLD AUTO: 7.2 % (ref 5–12)
NEUTROPHILS NFR BLD AUTO: 3.16 10*3/MM3 (ref 1.7–7)
NEUTROPHILS NFR BLD AUTO: 55.6 % (ref 42.7–76)
NRBC BLD AUTO-RTO: 0 /100 WBC (ref 0–0.2)
PLATELET # BLD AUTO: 284 10*3/MM3 (ref 140–450)
PMV BLD AUTO: 11.5 FL (ref 6–12)
POTASSIUM SERPL-SCNC: 3.5 MMOL/L (ref 3.5–5.2)
PROT SERPL-MCNC: 7.5 G/DL (ref 6–8.5)
RBC # BLD AUTO: 4.14 10*6/MM3 (ref 3.77–5.28)
SODIUM SERPL-SCNC: 135 MMOL/L (ref 136–145)
TRIGL SERPL-MCNC: 249 MG/DL (ref 0–150)
TSH SERPL DL<=0.05 MIU/L-ACNC: 1.87 UIU/ML (ref 0.27–4.2)
VIT B12 BLD-MCNC: 1516 PG/ML (ref 211–946)
VLDLC SERPL-MCNC: 42 MG/DL (ref 5–40)
WBC NRBC COR # BLD: 5.69 10*3/MM3 (ref 3.4–10.8)

## 2021-12-14 PROCEDURE — 82043 UR ALBUMIN QUANTITATIVE: CPT

## 2021-12-14 PROCEDURE — 80053 COMPREHEN METABOLIC PANEL: CPT

## 2021-12-14 PROCEDURE — 84443 ASSAY THYROID STIM HORMONE: CPT

## 2021-12-14 PROCEDURE — 85025 COMPLETE CBC W/AUTO DIFF WBC: CPT

## 2021-12-14 PROCEDURE — 82306 VITAMIN D 25 HYDROXY: CPT

## 2021-12-14 PROCEDURE — 82607 VITAMIN B-12: CPT

## 2021-12-14 PROCEDURE — 83036 HEMOGLOBIN GLYCOSYLATED A1C: CPT

## 2021-12-14 PROCEDURE — 82570 ASSAY OF URINE CREATININE: CPT

## 2021-12-14 PROCEDURE — 80061 LIPID PANEL: CPT

## 2021-12-18 DIAGNOSIS — R82.994 HYPERCALCIURIA: Primary | ICD-10-CM

## 2021-12-20 ENCOUNTER — OFFICE VISIT (OUTPATIENT)
Dept: ENDOCRINOLOGY | Facility: CLINIC | Age: 66
End: 2021-12-20

## 2021-12-20 ENCOUNTER — LAB (OUTPATIENT)
Dept: LAB | Facility: HOSPITAL | Age: 66
End: 2021-12-20

## 2021-12-20 ENCOUNTER — SPECIALTY PHARMACY (OUTPATIENT)
Dept: ENDOCRINOLOGY | Facility: CLINIC | Age: 66
End: 2021-12-20

## 2021-12-20 VITALS
DIASTOLIC BLOOD PRESSURE: 70 MMHG | HEIGHT: 70 IN | BODY MASS INDEX: 30.35 KG/M2 | SYSTOLIC BLOOD PRESSURE: 130 MMHG | OXYGEN SATURATION: 99 % | WEIGHT: 212 LBS | HEART RATE: 61 BPM

## 2021-12-20 DIAGNOSIS — E11.65 TYPE 2 DIABETES MELLITUS WITH HYPERGLYCEMIA, WITHOUT LONG-TERM CURRENT USE OF INSULIN (HCC): Primary | ICD-10-CM

## 2021-12-20 DIAGNOSIS — E55.9 VITAMIN D DEFICIENCY: ICD-10-CM

## 2021-12-20 DIAGNOSIS — R82.994 HYPERCALCIURIA: ICD-10-CM

## 2021-12-20 DIAGNOSIS — M80.08XA AGE-RELATED OSTEOPOROSIS WITH CURRENT PATHOLOGICAL FRACTURE, VERTEBRA(E), INITIAL ENCOUNTER FOR FRACTURE (HCC): ICD-10-CM

## 2021-12-20 DIAGNOSIS — M80.00XD OSTEOPOROSIS WITH PATHOLOGICAL FRACTURE WITH ROUTINE HEALING, SUBSEQUENT ENCOUNTER: ICD-10-CM

## 2021-12-20 LAB
ALBUMIN SERPL-MCNC: 4.3 G/DL (ref 3.5–5.2)
ANION GAP SERPL CALCULATED.3IONS-SCNC: 8 MMOL/L (ref 5–15)
BUN SERPL-MCNC: 18 MG/DL (ref 8–23)
BUN/CREAT SERPL: 21.7 (ref 7–25)
CA-I BLD-MCNC: 5.12 MG/DL (ref 4.6–5.6)
CALCIUM SPEC-SCNC: 10.2 MG/DL (ref 8.6–10.5)
CALCIUM SPEC-SCNC: 10.3 MG/DL (ref 8.6–10.5)
CHLORIDE SERPL-SCNC: 95 MMOL/L (ref 98–107)
CO2 SERPL-SCNC: 30 MMOL/L (ref 22–29)
CREAT SERPL-MCNC: 0.83 MG/DL (ref 0.57–1)
GFR SERPL CREATININE-BSD FRML MDRD: 68 ML/MIN/1.73
GLUCOSE SERPL-MCNC: 166 MG/DL (ref 65–99)
PHOSPHATE SERPL-MCNC: 3.6 MG/DL (ref 2.5–4.5)
POTASSIUM SERPL-SCNC: 3.8 MMOL/L (ref 3.5–5.2)
PTH-INTACT SERPL-MCNC: 11.6 PG/ML (ref 15–65)
SODIUM SERPL-SCNC: 133 MMOL/L (ref 136–145)

## 2021-12-20 PROCEDURE — 95251 CONT GLUC MNTR ANALYSIS I&R: CPT | Performed by: INTERNAL MEDICINE

## 2021-12-20 PROCEDURE — 83970 ASSAY OF PARATHORMONE: CPT | Performed by: INTERNAL MEDICINE

## 2021-12-20 PROCEDURE — 82310 ASSAY OF CALCIUM: CPT | Performed by: INTERNAL MEDICINE

## 2021-12-20 PROCEDURE — 36415 COLL VENOUS BLD VENIPUNCTURE: CPT | Performed by: INTERNAL MEDICINE

## 2021-12-20 PROCEDURE — 82330 ASSAY OF CALCIUM: CPT | Performed by: INTERNAL MEDICINE

## 2021-12-20 PROCEDURE — 99214 OFFICE O/P EST MOD 30 MIN: CPT | Performed by: INTERNAL MEDICINE

## 2021-12-20 PROCEDURE — 80069 RENAL FUNCTION PANEL: CPT | Performed by: INTERNAL MEDICINE

## 2021-12-20 RX ORDER — PHENOL 1.4 %
AEROSOL, SPRAY (ML) MUCOUS MEMBRANE NIGHTLY
COMMUNITY

## 2021-12-20 RX ORDER — MULTIVITAMIN WITH IRON
TABLET ORAL
COMMUNITY

## 2021-12-20 RX ORDER — ALBUTEROL SULFATE 90 UG/1
POWDER, METERED RESPIRATORY (INHALATION) EVERY 4 HOURS PRN
COMMUNITY

## 2021-12-20 RX ORDER — CETIRIZINE HYDROCHLORIDE 10 MG/1
10 TABLET ORAL DAILY
COMMUNITY

## 2021-12-20 RX ORDER — DULAGLUTIDE 1.5 MG/.5ML
1.5 INJECTION, SOLUTION SUBCUTANEOUS WEEKLY
COMMUNITY
End: 2022-06-13

## 2021-12-20 RX ORDER — INSULIN GLARGINE 300 U/ML
20 INJECTION, SOLUTION SUBCUTANEOUS
Qty: 6 ML | Refills: 3 | Status: SHIPPED | OUTPATIENT
Start: 2021-12-20 | End: 2022-08-16 | Stop reason: SDUPTHER

## 2021-12-20 RX ORDER — INSULIN LISPRO-AABC 100 [IU]/ML
15 INJECTION, SOLUTION SUBCUTANEOUS
Qty: 15 ML | Refills: 11 | Status: SHIPPED | OUTPATIENT
Start: 2021-12-20 | End: 2023-01-03

## 2021-12-20 NOTE — PROGRESS NOTES
"Rekha Dorado is a 66 y.o. female who presents for  evaluation of   Type 2 diabetes and osteoporosis    HPI       Very pleasant 65-year-old female sp right superior parathyroidectomy in April 2018 for primary hyperparathyroidism  Pathology confirmed that left parathyroids were identified, biopsied and left intact  The right inf was not found    Intraoperative PTH decreased from 74 to 8 intraoperatively     Complications, she has severe osteoporosis complicated by  wrist fracture.    After parathyroidectomy we started on prolia and bone density   decreased at the femoral neck despite treatment w prolia, calcium and vit D     Now on Evenity , started Dec 2020         Lab Results   Component Value Date    PTH 10.2 (L) 01/20/2021    CALCIUM 11.6 (H) 12/14/2021    PHOS 3.4 08/12/2021            ----------    She also has type 2 diabetes w loss of control        Review of Systems   Feels well , no concerns     Objective:   /70   Pulse 61   Ht 177.8 cm (70\")   Wt 96.2 kg (212 lb)   SpO2 99%   BMI 30.42 kg/m²     Physical Exam   HENT:   Head: Normocephalic.   Pulmonary/Chest: Effort normal and breath sounds normal.   Abdominal: Normal appearance.   Musculoskeletal:      Right lower leg: No edema.      Left lower leg: No edema.   Neurological: She is alert.       Lab Review    Lab Results   Component Value Date    WBC 5.69 12/14/2021    HGB 12.9 12/14/2021    HCT 37.5 12/14/2021    MCV 90.6 12/14/2021     12/14/2021     Lab Results   Component Value Date    GLUCOSE 156 (H) 12/14/2021    BUN 15 12/14/2021    CREATININE 0.98 12/14/2021    EGFRIFNONA 56 (L) 12/14/2021    BCR 15.3 12/14/2021    K 3.5 12/14/2021    CO2 29.0 12/14/2021    CALCIUM 11.6 (H) 12/14/2021    ALBUMIN 4.30 12/14/2021    AST 29 12/14/2021    ALT 26 12/14/2021             Assessment/Plan       ICD-10-CM ICD-9-CM   1. Type 2 diabetes mellitus with hyperglycemia, without long-term current use of insulin ([facility])  E11.65 250.00    "  790.29           Type 2 diabetes             Lab Results   Component Value Date    HGBA1C 8.04 (H) 12/14/2021    HGBA1C 8.70 (H) 08/12/2021    HGBA1C 8.78 (H) 04/12/2021     Lab Results   Component Value Date    MICROALBUR <1.2 12/14/2021    CREATININE 0.98 12/14/2021         metformin 500 mg at night as much as she can take   trulicity 0.75 mg weekly - 1.5 mg weekly    Lantus 10 units doesn't seem to be enough , has taken as much as 20   Suggest 12 and see   She has meche phenomenon     Afrezza, gave cough    rx for lyumjev  4 to 10 units as needed w meals         Dexcom , 2 week report  \type 2 diabetes w hyperglycemia   She has learned to exercise down hyperglycemia    Now spoke about 2 units per serving above tolerated  Or 2 units per 50 above 150         Lipids    LDL normal  Tg elevated , vascepa   ===========    Osteoporosis      Primary hyperparathyroidism - cured by right sup parathyroidectomy in Feb 2018  Dr. Rodriguez correctly identified Left normal sup and inferior parathyroids  Right inferior never identified   She was clearly cured after surgery     Component      Latest Ref Rng & Units 1/23/2018           8:58 AM   PTH Intact (Serial Monitor)      15.0 - 65.0 pg/mL 74.8 (H)   Calcium      8.6 - 10.5 mg/dL 12.9 (H)     Component      Latest Ref Rng & Units 6/4/2018  postop           8:07 AM   PTH Intact (Serial Monitor)      15.0 - 65.0 pg/mL 8.3 (L)   Calcium      8.6 - 10.5 mg/dL 10.2     Lab Results   Component Value Date    PTH 10.2 (L) 01/20/2021    CALCIUM 11.6 (H) 12/14/2021    PHOS 3.4 08/12/2021         Dec 2020     ruled out celiac dx       Ruled out Cushing's by LDDST    Component      Latest Ref Rng & Units 12/11/2020 12/14/2020 12/16/2020   ACTH      7.2 - 63.3 pg/mL 30.7  2.7 (L)   Cortisol - AM      mcg/dL 13.89  1.43       Hypercalciuria remained despite correction of Primary Hyperparathyroidism I start HCTZ and now 24 h urine calcium less than 300         2000 units of vitamin D and calcium  400 to 600 mg bid or dietary calcium       on prolia since  but due to loss I changed to evenity      DXA 2020  Stable at spine but declining at Femoral Neck , markers positioned correctly                 ----     completed evenity for 1 year, completion date Dec 2021  Gain of 3 % at both Femoral Necks by my personal review    Now needs prolia   --    Diffuse goiter, nl TSH    No need for repeat US , just follow TSH     Lab Results   Component Value Date    TSH 1.870 2021    TSH 1.780 2020    TSH 2.400 2020       Thyroid Antibodies  TPO AB  Lab Results   Component Value Date    THYROIDAB 7 2018       ==    Anemia    Will have colonoscopy     ==    Low Magnesium     Start Mg Oxide 200 mg daily - doing OTC    Now nl magnesium     ==    Low b12 on IM injection - [facility]     Needs to take  Sublingual        A copy of my note was sent to Mariam Morgan MD    Please see my above opinion and suggestions.      Patient Demographics    Patient Name   Rekha Doradohip Legal Sex   Female    1955 [facility]    Address   [address] [place] [address] Phone   [phone] (Home)   [phone] (Mobile)   Emergency Contact(s)    Name Relation Home Work Mobile   Dioni Brantley [phone]     PCP and Center    Primary Care Provider Phone Center   Mariam Morgan -[phone] [facility]   Patient Employment    Status   Retired   Active Insurance as of 2021    UNITED HE[employer] MEDICARE REPLACEMENT - UNITED HEALTHCARE MEDICARE REPLACEMENT    Payor Plan Insurance Group Employer/Plan Group   UNITED HE[employer] MEDICARE REPLACEMENT UNITED HEALTHCARE MEDICARE REPLACEMENT [id number]    Payor Plan Address Payor Plan Phone Number Payor Plan Fax Number Effective Dates   [address] [address]   2018 - None Entered   [facility] [id number]      Subscriber Name Subscriber Birth Date Member ID    REKHA DORADO 1955 [id number]    Current  Medications    aspirin 81 MG EC tablet   budesonide-formoterol (SYMBICORT) 160-4.5 MCG/ACT inhaler   carvedilol (COREG) 3.125 MG tablet   Cholecalciferol (VITAMIN D) 2000 units tablet   choline fenofibrate (TRILIPIX) 135 MG capsule   CRANBERRY EXTRACT PO   Cyanocobalamin (B-12) 500 MCG sublingual tablet   dicyclomine (BENTYL) 10 MG capsule   Dulaglutide 0.75 MG/0.5ML solution pen-injector   fexofenadine (ALLEGRA) 60 MG tablet   glucosamine sulfate 500 MG capsule capsule   hydroCHLOROthiazide (HYDRODIURIL) 12.5 MG tablet   KRILL OIL PO   losartan (COZAAR) 50 MG tablet   metFORMIN ER (Glucophage XR) 500 MG 24 hr tablet   montelukast (SINGULAIR) 10 MG tablet   olopatadine (PATADAY) 0.2 % solution ophthalmic solution   omeprazole (priLOSEC) 20 MG capsule   ondansetron (Zofran) 4 MG tablet   Insulin Glargine (Lantus SoloStar) 100 UNIT/ML injection pen   Insulin Pen Needle (Pen Needles) 32G X 4 MM misc   Insulin Regular Human 4 & 8 & 12 units powder   Insulin Regular Human (Afrezza) 4 units powder (Discontinued)   predniSONE (DELTASONE) 10 MG (21) dose pack (Discontinued)   Allergies    No Known Allergies  Future Appointments     Provider Department Center   8/31/2021 3:30 PM [facility] OP INFU CHAIR 15 [facility] OP INFUSION [facility]   12/20/2021 9:45 AM Maxi Rios MD [facility] MEDICAL GROUP ENDOCRINOLOGY [facility]

## 2021-12-20 NOTE — PROGRESS NOTES
Specialty Pharmacy Patient Management Program  Endocrinology Initial Assessment     Rekha Doardo is a 66 y.o. female with Type 2 Diabetes seen by an Endocrinology provider and enrolled in the Endocrinology Patient Management program offered by [facility].  An initial outreach was conducted, including assessment of therapy appropriateness and specialty medication education for Toujeo and Khloe. The patient was introduced to services offered by [facility], including: regular assessments, refill coordination, curbside pick-up or mail order delivery options, prior authorization maintenance, and financial assistance programs as applicable. The patient was also provided with contact information for the pharmacy team.     Insurance Coverage & Financial Support  Medicare    Relevant Past Medical History and Comorbidities  Relevant medical history and concomitant health conditions were discussed with the patient. The patient's chart has been reviewed for relevant past medical history and comorbid health conditions and updated as necessary.   Past Medical History:   Diagnosis Date   • Acid reflux    • Arthritis    • Asthma    • Controlled type 2 diabetes mellitus without complication, without long-term current use of insulin ([facility]) 12/19/2017   • High blood triglycerides    • Hypertension    • Left thyroid nodule 12/19/2017   • Primary hyperparathyroidism ([facility]) 12/19/2017   • Severe osteopetrosis 12/19/2017   • Skin cancer    • Sleep apnea      Social History     Socioeconomic History   • Marital status:    Tobacco Use   • Smoking status: Never Smoker   • Smokeless tobacco: Never Used   Substance and Sexual Activity   • Alcohol use: No   • Drug use: No   • Sexual activity: Defer       Allergies  Known allergies and reactions were discussed with the patient. The patient's chart has been reviewed for  allergy information and updated as necessary.   Patient  has no known allergies.    Current Medication List  This medication list has been reviewed with the patient and evaluated for any interactions or necessary modifications/recommendations, and updated to include all prescription medications, OTC medications, and supplements the patient is currently taking.  This list reflects what is contained in the patient's profile, which has also been marked as reviewed to communicate to other providers it is the most up to date version of the patient's current medication therapy.     Current Outpatient Medications:   •  albuterol (ProAir RespiClick) 108 (90 Base) MCG/ACT inhaler, Inhale Every 4 (Four) Hours As Needed for Wheezing., Disp: , Rfl:   •  aspirin 81 MG EC tablet, Take 81 mg by mouth Every Night., Disp: , Rfl:   •  budesonide-formoterol (SYMBICORT) 160-4.5 MCG/ACT inhaler, Inhale 2 puffs 2 (Two) Times a Day., Disp: , Rfl:   •  carvedilol (COREG) 3.125 MG tablet, Take 3.125 mg by mouth 2 (Two) Times a Day With Meals., Disp: , Rfl:   •  cetirizine (zyrTEC) 10 MG tablet, Take 10 mg by mouth Daily., Disp: , Rfl:   •  Cholecalciferol (VITAMIN D) 2000 units tablet, Take 2,000 Units by mouth Daily., Disp: , Rfl:   •  choline fenofibrate (TRILIPIX) 135 MG capsule, Take 135 mg by mouth Every Night., Disp: , Rfl:   •  CRANBERRY EXTRACT PO, Take  by mouth 2 (Two) Times a Day. 2 tabs, Disp: , Rfl:   •  Cyanocobalamin (B-12) 500 MCG sublingual tablet, 500 mcgs under the tongue every other day, Disp: 15 tablet, Rfl: 11  •  dicyclomine (BENTYL) 10 MG capsule, Take 10 mg by mouth 3 (Three) Times a Day As Needed., Disp: , Rfl:   •  Dulaglutide (Trulicity) 1.5 MG/0.5ML solution pen-injector, Inject 1.5 mg under the skin into the appropriate area as directed 1 (One) Time Per Week., Disp: , Rfl:   •  glucosamine sulfate 500 MG capsule capsule, Take  by mouth 2 (Two) Times a Day. 1 tab in the morning and 2 tabs at night, Disp: , Rfl:   •  hydroCHLOROthiazide (HYDRODIURIL) 12.5 MG tablet,  TAKE ONE TABLET BY MOUTH DAILY, Disp: 90 tablet, Rfl: 0  •  Insulin Glargine, 2 Unit Dial, (Toujeo Max SoloStar) 300 UNIT/ML solution pen-injector injection, Inject 20 Units under the skin into the appropriate area as directed every night at bedtime., Disp: 6 mL, Rfl: 3  •  Insulin Lispro-aabc, 1 U Dial, (Lyumjev KwikPen) 100 UNIT/ML solution pen-injector, Inject up to 15 Units under the skin into the appropriate area as directed 3 (Three) Times a Day With Meals., Disp: 15 mL, Rfl: 11  •  Insulin Pen Needle (Pen Needles) 32G X 4 MM misc, 1 each Daily. Use 4 x daily, Dx code E11.65, Disp: 90 each, Rfl: 11  •  KRILL OIL PO, Take  by mouth Every Night., Disp: , Rfl:   •  losartan (COZAAR) 50 MG tablet, Take 50 mg by mouth Daily., Disp: , Rfl:   •  Magnesium 250 MG tablet, Take  by mouth., Disp: , Rfl:   •  Melatonin 10 MG tablet, Take  by mouth Every Night., Disp: , Rfl:   •  metFORMIN ER (Glucophage XR) 500 MG 24 hr tablet, 1 tab w supper, ER only , please cancel IR, Disp: 90 tablet, Rfl: 3  •  montelukast (SINGULAIR) 10 MG tablet, Take 10 mg by mouth Every Night., Disp: , Rfl:   •  olopatadine (PATADAY) 0.2 % solution ophthalmic solution, Daily As Needed., Disp: , Rfl:   •  omeprazole (priLOSEC) 20 MG capsule, Take 20 mg by mouth Every Night., Disp: , Rfl:   No current facility-administered medications for this visit.    Drug Interactions  None    Recommended Medications Assessment  • Aspirin - Currently Taking  • Statin - Indicated, not currently taking  • ACEi/ARB - Currently Taking    Relevant Laboratory Values  A1C Last 3 Results    HGBA1C Last 3 Results 4/12/21 8/12/21 12/14/21   Hemoglobin A1C 8.78 (A) 8.70 (A) 8.04 (A)   (A) Abnormal value            Lab Results   Component Value Date    HGBA1C 8.04 (H) 12/14/2021     Lab Results   Component Value Date    GLUCOSE 166 (H) 12/20/2021    CALCIUM 10.2 12/20/2021    CALCIUM 10.3 12/20/2021     (L) 12/20/2021    K 3.8 12/20/2021    CO2 30.0 (H) 12/20/2021     CL 95 (L) 12/20/2021    BUN 18 12/20/2021    CREATININE 0.83 12/20/2021    EGFRIFNONA 68 12/20/2021    BCR 21.7 12/20/2021    ANIONGAP 8.0 12/20/2021     Lab Results   Component Value Date    CHOL 151 12/14/2021    TRIG 249 (H) 12/14/2021    HDL 28 (L) 12/14/2021    LDL 81 12/14/2021     Microalbumin    Microalbumin 12/14/21   Microalbumin, Urine <1.2             Initial Education Provided for Specialty Medication  The patient has been provided with the following education and any applicable administration techniques (i.e. self-injection) have been demonstrated for the therapies indicated. All questions and concerns have been addressed prior to the patient receiving the medication, and the patient has verbalized understanding of the education and any materials provided.  Additional patient education shall be provided and documented upon request by the patient, provider or payer.        Adherence and Self-Administration  • Barriers to Patient Adherence and/or Self-Administration: None  • Methods for Supporting Patient Adherence and/or Self-Administration: None    Goals of Therapy  • Patient Goals of Therapy: She wants to gain better control of her glucose, especially in the morning when she first wakes up  • Clinical Goals or Therapeutic Targets, If Applicable: we want to see her start using SSI and hopefully gain better control with toujeo    Reassessment Plan & Follow-Up  1. Medication Therapy Changes: Change Lantus to Toujeo, rapid acting insulin with Lyumjev  2. Additional Plans, Therapy Recommendations, or Therapy Problems to Be Addressed: Should consider a statin  3. Pharmacist to perform regular reassessments no more than (6) months from the previous assessment.  4. Welcome information and patient satisfaction survey to be sent by retail team with patient's initial fill.  5. Care Coordinator to set up future refill outreaches, coordinate prescription delivery, and escalate clinical questions to pharmacist.      Attestation  I attest that the initiated specialty medication(s) are appropriate for the patient based on my assessment.  If the prescribed therapy is at any point deemed not appropriate based on the current or future assessments, a consultation will be initiated with the patient's specialty care provider to determine the best course of action. The revised plan of therapy will be documented along with any additional patient education provided.     Shivam Sanchez, DorianD, MPH, BCPS    12/20/2021  14:24 CST

## 2021-12-28 ENCOUNTER — LAB (OUTPATIENT)
Dept: LAB | Facility: HOSPITAL | Age: 66
End: 2021-12-28

## 2021-12-28 DIAGNOSIS — M80.00XD OSTEOPOROSIS WITH PATHOLOGICAL FRACTURE WITH ROUTINE HEALING, SUBSEQUENT ENCOUNTER: ICD-10-CM

## 2021-12-28 PROCEDURE — 84100 ASSAY OF PHOSPHORUS: CPT

## 2021-12-28 PROCEDURE — 80053 COMPREHEN METABOLIC PANEL: CPT

## 2021-12-28 PROCEDURE — 83735 ASSAY OF MAGNESIUM: CPT

## 2021-12-29 LAB
ALBUMIN SERPL-MCNC: 4.7 G/DL (ref 3.5–5.2)
ALBUMIN/GLOB SERPL: 1.6 G/DL
ALP SERPL-CCNC: 120 U/L (ref 39–117)
ALT SERPL W P-5'-P-CCNC: 36 U/L (ref 1–33)
ANION GAP SERPL CALCULATED.3IONS-SCNC: 10 MMOL/L (ref 5–15)
AST SERPL-CCNC: 34 U/L (ref 1–32)
BILIRUB SERPL-MCNC: 0.3 MG/DL (ref 0–1.2)
BUN SERPL-MCNC: 16 MG/DL (ref 8–23)
BUN/CREAT SERPL: 16.3 (ref 7–25)
CALCIUM SPEC-SCNC: 10 MG/DL (ref 8.6–10.5)
CHLORIDE SERPL-SCNC: 93 MMOL/L (ref 98–107)
CO2 SERPL-SCNC: 28 MMOL/L (ref 22–29)
CREAT SERPL-MCNC: 0.98 MG/DL (ref 0.57–1)
GFR SERPL CREATININE-BSD FRML MDRD: 56 ML/MIN/1.73
GLOBULIN UR ELPH-MCNC: 3 GM/DL
GLUCOSE SERPL-MCNC: 325 MG/DL (ref 65–99)
MAGNESIUM SERPL-MCNC: 1.6 MG/DL (ref 1.6–2.4)
PHOSPHATE SERPL-MCNC: 3.8 MG/DL (ref 2.5–4.5)
POTASSIUM SERPL-SCNC: 4 MMOL/L (ref 3.5–5.2)
PROT SERPL-MCNC: 7.7 G/DL (ref 6–8.5)
SODIUM SERPL-SCNC: 131 MMOL/L (ref 136–145)

## 2022-01-03 ENCOUNTER — INFUSION (OUTPATIENT)
Dept: ONCOLOGY | Facility: HOSPITAL | Age: 67
End: 2022-01-03

## 2022-01-03 VITALS
SYSTOLIC BLOOD PRESSURE: 128 MMHG | DIASTOLIC BLOOD PRESSURE: 59 MMHG | HEART RATE: 75 BPM | TEMPERATURE: 96 F | RESPIRATION RATE: 16 BRPM

## 2022-01-03 DIAGNOSIS — M80.00XD OSTEOPOROSIS WITH PATHOLOGICAL FRACTURE WITH ROUTINE HEALING, SUBSEQUENT ENCOUNTER: Primary | ICD-10-CM

## 2022-01-03 PROCEDURE — 96372 THER/PROPH/DIAG INJ SC/IM: CPT | Performed by: NURSE PRACTITIONER

## 2022-01-03 PROCEDURE — 25010000002 DENOSUMAB 60 MG/ML SOLUTION PREFILLED SYRINGE: Performed by: INTERNAL MEDICINE

## 2022-01-03 PROCEDURE — 25010000002 DENOSUMAB 60 MG/ML SOLUTION PREFILLED SYRINGE: Performed by: NURSE PRACTITIONER

## 2022-01-03 RX ADMIN — DENOSUMAB 60 MG: 60 INJECTION SUBCUTANEOUS at 13:52

## 2022-02-28 DIAGNOSIS — E11.65 TYPE 2 DIABETES MELLITUS WITH HYPERGLYCEMIA, WITHOUT LONG-TERM CURRENT USE OF INSULIN: Primary | ICD-10-CM

## 2022-02-28 RX ORDER — HYDROCHLOROTHIAZIDE 12.5 MG/1
12.5 TABLET ORAL DAILY
Qty: 90 TABLET | Refills: 0 | Status: SHIPPED | OUTPATIENT
Start: 2022-02-28 | End: 2022-05-24

## 2022-03-21 ENCOUNTER — SPECIALTY PHARMACY (OUTPATIENT)
Dept: ENDOCRINOLOGY | Facility: CLINIC | Age: 67
End: 2022-03-21

## 2022-03-21 NOTE — PROGRESS NOTES
Specialty Pharmacy Refill Coordination Note     Rekha is a 66 y.o. female contacted today regarding refills of  Lyumjev  specialty medication(s).    Reviewed and verified with patient:  Allergies         Specialty medication(s) and dose(s) confirmed: yes    Refill Questions    Flowsheet Row Most Recent Value   Changes to medications? No   New conditions since last clinic visit No   Unplanned office visit, urgent care, ED, or hospital admission in the last 4 weeks  No   How does patient/caregiver feel medication is working? Very good   Financial problems or insurance changes  No   How many doses of your specialty medications were missed in the last 4 weeks? 0   Does this patient require a clinical escalation to a pharmacist? No          Delivery Questions    Flowsheet Row Most Recent Value   Delivery method  at Pharmacy        Lyumjev was picked up today in the pharmacy but patient didn't need the Toujeo yet, she will follow up with us as needed.           Follow-up: 3 months     Brian Devi  Specialty Pharmacy Technician

## 2022-04-13 RX ORDER — METFORMIN HYDROCHLORIDE 500 MG/1
TABLET, EXTENDED RELEASE ORAL
Qty: 90 TABLET | Refills: 3 | Status: SHIPPED | OUTPATIENT
Start: 2022-04-13

## 2022-04-26 ENCOUNTER — SPECIALTY PHARMACY (OUTPATIENT)
Dept: ENDOCRINOLOGY | Facility: CLINIC | Age: 67
End: 2022-04-26

## 2022-04-26 NOTE — PROGRESS NOTES
Specialty Pharmacy Refill Coordination Note     Rekha is a 66 y.o. female who contacted our office today regarding refills of  Lyumjev.      We are able to mail this out today since she will be out on Wednesday.  A refill coordination will be added each month to make sure she gets her non-speciality drug.                  Follow-up: 1 month     Brian Devi  Specialty Pharmacy Technician

## 2022-05-13 ENCOUNTER — SPECIALTY PHARMACY (OUTPATIENT)
Dept: ENDOCRINOLOGY | Facility: CLINIC | Age: 67
End: 2022-05-13

## 2022-05-13 NOTE — PROGRESS NOTES
Specialty Pharmacy Refill Coordination Note     Rekha is a 67 y.o. female contacted today regarding refills of  Toujeo specialty medication(s).    Reviewed and verified with patient:  Allergies         Specialty medication(s) and dose(s) confirmed: yes    Refill Questions    Flowsheet Row Most Recent Value   Changes to allergies? No   Changes to medications? No   New conditions since last clinic visit No   Unplanned office visit, urgent care, ED, or hospital admission in the last 4 weeks  No   How does patient/caregiver feel medication is working? Very good   Financial problems or insurance changes  No   Since the previous refill, were any specialty medication doses or scheduled injections missed or delayed?  No   If yes, please provide the amount 0   Does this patient require a clinical escalation to a pharmacist? No          Delivery Questions    Flowsheet Row Most Recent Value   Delivery method [employer]   Delivery address correct? Yes   Delivery phone number [phone]   Number of medications in delivery 1   Medication being filled and delivered toujeo   Doses left of specialty medications 6   Is there any medication that is due not being filled? No   Cooler needed? Yes   Do any medications need mixed or dated? No   Copay form of payment Credit card on file   Questions or concerns for the pharmacist? No   Are any medications first time fills? No        Patient called for refills of her Toujeo this will be mailed out on Monday.  [address] address.          Follow-up: 3 months.      Brian Devi  Specialty Pharmacy Technician

## 2022-05-23 DIAGNOSIS — E11.65 TYPE 2 DIABETES MELLITUS WITH HYPERGLYCEMIA, WITHOUT LONG-TERM CURRENT USE OF INSULIN: ICD-10-CM

## 2022-05-24 RX ORDER — HYDROCHLOROTHIAZIDE 12.5 MG/1
TABLET ORAL
Qty: 90 TABLET | Refills: 3 | Status: SHIPPED | OUTPATIENT
Start: 2022-05-24

## 2022-05-31 ENCOUNTER — SPECIALTY PHARMACY (OUTPATIENT)
Dept: ENDOCRINOLOGY | Facility: CLINIC | Age: 67
End: 2022-05-31

## 2022-05-31 NOTE — PROGRESS NOTES
Specialty Pharmacy Refill Coordination Note     Rekha is a 67 y.o. female contacted today regarding refills of  Lyumjev specialty medication(s).    Reviewed and verified with patient:  Allergies         Specialty medication(s) and dose(s) confirmed: yes    Refill Questions    Flowsheet Row Most Recent Value   Changes to allergies? No   Changes to medications? No   New conditions since last clinic visit No   Unplanned office visit, urgent care, ED, or hospital admission in the last 4 weeks  No   How does patient/caregiver feel medication is working? Very good   Financial problems or insurance changes  No   Since the previous refill, were any specialty medication doses or scheduled injections missed or delayed?  No   If yes, please provide the amount 0   Does this patient require a clinical escalation to a pharmacist? No          Delivery Questions    Flowsheet Row Most Recent Value   Delivery method [employer]Ex   Delivery address correct? Yes  [[address]]   Delivery phone number [phone]   Number of medications in delivery 1   Medication being filled and delivered lyumjev   Doses left of specialty medications 5   Is there any medication that is due not being filled? No   Do any medications need mixed or dated? No   Copay form of payment Credit card on file   Questions or concerns for the pharmacist? No        Pt called needing refill of lyumjev.  We are going to mail it today before she leaves on vacation Thursday.           Follow-up: 1 month.      Brian Devi  Specialty Pharmacy Technician

## 2022-06-07 ENCOUNTER — DOCUMENTATION (OUTPATIENT)
Dept: ENDOCRINOLOGY | Facility: CLINIC | Age: 67
End: 2022-06-07

## 2022-06-08 ENCOUNTER — LAB (OUTPATIENT)
Dept: LAB | Facility: HOSPITAL | Age: 67
End: 2022-06-08

## 2022-06-08 LAB
25(OH)D3 SERPL-MCNC: 79.5 NG/ML (ref 30–100)
ALBUMIN SERPL-MCNC: 4.7 G/DL (ref 3.5–5.2)
ALBUMIN UR-MCNC: <1.2 MG/DL
ALBUMIN/GLOB SERPL: 1.5 G/DL
ALP SERPL-CCNC: 87 U/L (ref 39–117)
ALT SERPL W P-5'-P-CCNC: 27 U/L (ref 1–33)
ANION GAP SERPL CALCULATED.3IONS-SCNC: 13 MMOL/L (ref 5–15)
AST SERPL-CCNC: 31 U/L (ref 1–32)
BASOPHILS # BLD AUTO: 0.06 10*3/MM3 (ref 0–0.2)
BASOPHILS NFR BLD AUTO: 0.9 % (ref 0–1.5)
BILIRUB SERPL-MCNC: 0.4 MG/DL (ref 0–1.2)
BUN SERPL-MCNC: 17 MG/DL (ref 8–23)
BUN/CREAT SERPL: 23.3 (ref 7–25)
CA-I BLD-MCNC: 5.3 MG/DL (ref 4.6–5.4)
CA-I SERPL ISE-MCNC: 1.33 MMOL/L (ref 1.15–1.35)
CALCIUM SPEC-SCNC: 10 MG/DL (ref 8.6–10.5)
CALCIUM SPEC-SCNC: 9.9 MG/DL (ref 8.6–10.5)
CHLORIDE SERPL-SCNC: 96 MMOL/L (ref 98–107)
CHOLEST SERPL-MCNC: 155 MG/DL (ref 0–200)
CO2 SERPL-SCNC: 25 MMOL/L (ref 22–29)
CREAT SERPL-MCNC: 0.73 MG/DL (ref 0.57–1)
CREAT UR-MCNC: 80.4 MG/DL
DEPRECATED RDW RBC AUTO: 42.5 FL (ref 37–54)
EGFRCR SERPLBLD CKD-EPI 2021: 89.2 ML/MIN/1.73
EOSINOPHIL # BLD AUTO: 0.33 10*3/MM3 (ref 0–0.4)
EOSINOPHIL NFR BLD AUTO: 4.8 % (ref 0.3–6.2)
ERYTHROCYTE [DISTWIDTH] IN BLOOD BY AUTOMATED COUNT: 13 % (ref 12.3–15.4)
GLOBULIN UR ELPH-MCNC: 3.1 GM/DL
GLUCOSE SERPL-MCNC: 167 MG/DL (ref 65–99)
HBA1C MFR BLD: 8.9 % (ref 4.8–5.6)
HCT VFR BLD AUTO: 37.9 % (ref 34–46.6)
HDLC SERPL-MCNC: 33 MG/DL (ref 40–60)
HGB BLD-MCNC: 13.1 G/DL (ref 12–15.9)
IMM GRANULOCYTES # BLD AUTO: 0.02 10*3/MM3 (ref 0–0.05)
IMM GRANULOCYTES NFR BLD AUTO: 0.3 % (ref 0–0.5)
LDLC SERPL CALC-MCNC: 90 MG/DL (ref 0–100)
LDLC/HDLC SERPL: 2.59 {RATIO}
LYMPHOCYTES # BLD AUTO: 1.72 10*3/MM3 (ref 0.7–3.1)
LYMPHOCYTES NFR BLD AUTO: 25.2 % (ref 19.6–45.3)
MAGNESIUM SERPL-MCNC: 1.7 MG/DL (ref 1.6–2.4)
MCH RBC QN AUTO: 31 PG (ref 26.6–33)
MCHC RBC AUTO-ENTMCNC: 34.6 G/DL (ref 31.5–35.7)
MCV RBC AUTO: 89.8 FL (ref 79–97)
MICROALBUMIN/CREAT UR: NORMAL MG/G{CREAT}
MONOCYTES # BLD AUTO: 0.52 10*3/MM3 (ref 0.1–0.9)
MONOCYTES NFR BLD AUTO: 7.6 % (ref 5–12)
NEUTROPHILS NFR BLD AUTO: 4.17 10*3/MM3 (ref 1.7–7)
NEUTROPHILS NFR BLD AUTO: 61.2 % (ref 42.7–76)
NRBC BLD AUTO-RTO: 0 /100 WBC (ref 0–0.2)
PLATELET # BLD AUTO: 289 10*3/MM3 (ref 140–450)
PMV BLD AUTO: 11 FL (ref 6–12)
POTASSIUM SERPL-SCNC: 3.6 MMOL/L (ref 3.5–5.2)
PROT SERPL-MCNC: 7.8 G/DL (ref 6–8.5)
PTH-INTACT SERPL-MCNC: 18.2 PG/ML (ref 15–65)
RBC # BLD AUTO: 4.22 10*6/MM3 (ref 3.77–5.28)
SODIUM SERPL-SCNC: 134 MMOL/L (ref 136–145)
TRIGL SERPL-MCNC: 183 MG/DL (ref 0–150)
TSH SERPL DL<=0.05 MIU/L-ACNC: 1.47 UIU/ML (ref 0.27–4.2)
VIT B12 BLD-MCNC: 965 PG/ML (ref 211–946)
VLDLC SERPL-MCNC: 32 MG/DL (ref 5–40)
WBC NRBC COR # BLD: 6.82 10*3/MM3 (ref 3.4–10.8)

## 2022-06-08 PROCEDURE — 85025 COMPLETE CBC W/AUTO DIFF WBC: CPT | Performed by: INTERNAL MEDICINE

## 2022-06-08 PROCEDURE — 83970 ASSAY OF PARATHORMONE: CPT | Performed by: INTERNAL MEDICINE

## 2022-06-08 PROCEDURE — 82607 VITAMIN B-12: CPT | Performed by: INTERNAL MEDICINE

## 2022-06-08 PROCEDURE — 82306 VITAMIN D 25 HYDROXY: CPT | Performed by: INTERNAL MEDICINE

## 2022-06-08 PROCEDURE — 83036 HEMOGLOBIN GLYCOSYLATED A1C: CPT | Performed by: INTERNAL MEDICINE

## 2022-06-08 PROCEDURE — 84443 ASSAY THYROID STIM HORMONE: CPT | Performed by: INTERNAL MEDICINE

## 2022-06-08 PROCEDURE — 82330 ASSAY OF CALCIUM: CPT | Performed by: INTERNAL MEDICINE

## 2022-06-08 PROCEDURE — 82043 UR ALBUMIN QUANTITATIVE: CPT | Performed by: INTERNAL MEDICINE

## 2022-06-08 PROCEDURE — 80053 COMPREHEN METABOLIC PANEL: CPT | Performed by: INTERNAL MEDICINE

## 2022-06-08 PROCEDURE — 83735 ASSAY OF MAGNESIUM: CPT | Performed by: INTERNAL MEDICINE

## 2022-06-08 PROCEDURE — 82570 ASSAY OF URINE CREATININE: CPT | Performed by: INTERNAL MEDICINE

## 2022-06-08 PROCEDURE — 80061 LIPID PANEL: CPT | Performed by: INTERNAL MEDICINE

## 2022-06-13 ENCOUNTER — OFFICE VISIT (OUTPATIENT)
Dept: ENDOCRINOLOGY | Facility: CLINIC | Age: 67
End: 2022-06-13

## 2022-06-13 VITALS
SYSTOLIC BLOOD PRESSURE: 110 MMHG | DIASTOLIC BLOOD PRESSURE: 60 MMHG | HEIGHT: 70 IN | WEIGHT: 214 LBS | OXYGEN SATURATION: 96 % | BODY MASS INDEX: 30.64 KG/M2 | HEART RATE: 79 BPM

## 2022-06-13 DIAGNOSIS — E53.8 B12 DEFICIENCY: ICD-10-CM

## 2022-06-13 DIAGNOSIS — E21.3 HYPERPARATHYROIDISM, UNSPECIFIED: ICD-10-CM

## 2022-06-13 DIAGNOSIS — R82.994 HYPERCALCIURIA: ICD-10-CM

## 2022-06-13 DIAGNOSIS — M80.08XA AGE-RELATED OSTEOPOROSIS WITH CURRENT PATHOLOGICAL FRACTURE, VERTEBRA(E), INITIAL ENCOUNTER FOR FRACTURE: ICD-10-CM

## 2022-06-13 DIAGNOSIS — E55.9 VITAMIN D DEFICIENCY: ICD-10-CM

## 2022-06-13 DIAGNOSIS — E11.65 TYPE 2 DIABETES MELLITUS WITH HYPERGLYCEMIA, WITHOUT LONG-TERM CURRENT USE OF INSULIN: Primary | ICD-10-CM

## 2022-06-13 PROCEDURE — 99214 OFFICE O/P EST MOD 30 MIN: CPT | Performed by: INTERNAL MEDICINE

## 2022-06-13 PROCEDURE — 95251 CONT GLUC MNTR ANALYSIS I&R: CPT | Performed by: INTERNAL MEDICINE

## 2022-06-13 NOTE — PROGRESS NOTES
"Rekha Dorado is a 67 y.o. female who presents for  evaluation of   Type 2 diabetes and osteoporosis    HPI       Very pleasant 65-year-old female sp right superior parathyroidectomy in April 2018 for primary hyperparathyroidism  Pathology confirmed that left parathyroids were identified, biopsied and left intact  The right inf was not found    Intraoperative PTH decreased from 74 to 8 intraoperatively     Complications, she has severe osteoporosis complicated by  wrist fracture.    After parathyroidectomy we started on prolia and bone density   decreased at the femoral neck despite treatment w prolia, calcium and vit D     Now on Evenity , started Dec 2020         Lab Results   Component Value Date    PTH 18.2 06/08/2022    CALCIUM 10.0 06/08/2022    CALCIUM 9.9 06/08/2022    CAION 1.33 06/08/2022    PHOS 3.8 12/28/2021            ----------    She also has type 2 diabetes w loss of control        Review of Systems   Cardiovascular: Positive for leg swelling.      Feels well , no concerns     Objective:   /60   Pulse 79   Ht 177.8 cm (70\")   Wt 97.1 kg (214 lb)   SpO2 96%   BMI 30.71 kg/m²     Physical Exam   HENT:   Head: Normocephalic.   Pulmonary/Chest: Effort normal and breath sounds normal.   Abdominal: Normal appearance.   Musculoskeletal:      Right lower leg: No edema.      Left lower leg: No edema.   Neurological: She is alert.       Lab Review    Lab Results   Component Value Date    WBC 6.82 06/08/2022    HGB 13.1 06/08/2022    HCT 37.9 06/08/2022    MCV 89.8 06/08/2022     06/08/2022     Lab Results   Component Value Date    GLUCOSE 167 (H) 06/08/2022    BUN 17 06/08/2022    CREATININE 0.73 06/08/2022    EGFRIFNONA 56 (L) 12/28/2021    BCR 23.3 06/08/2022    K 3.6 06/08/2022    CO2 25.0 06/08/2022    CALCIUM 10.0 06/08/2022    CALCIUM 9.9 06/08/2022    ALBUMIN 4.70 06/08/2022    AST 31 06/08/2022    ALT 27 06/08/2022             Assessment & Plan       ICD-10-CM ICD-9-CM "   1. Type 2 diabetes mellitus with hyperglycemia, without long-term current use of insulin ([facility])  E11.65 250.00     790.29   2. Age-related osteoporosis with current pathological fracture, vertebra(e), initial encounter for fracture (CMS/[facility])   M80.08XA 733.13     733.01   3. Hypercalciuria  R82.994 275.40   4. Vitamin D deficiency  E55.9 268.9   5. B12 deficiency  E53.8 266.2   6. Hyperparathyroidism, unspecified ([facility])  E21.3 252.00           Type 2 diabetes             Lab Results   Component Value Date    HGBA1C 8.90 (H) 06/08/2022    HGBA1C 8.04 (H) 12/14/2021    HGBA1C 8.70 (H) 08/12/2021     Lab Results   Component Value Date    MICROALBUR <1.2 06/08/2022    CREATININE 0.73 06/08/2022         metformin 500 mg at night as much as she can take   trulicity 0.75 mg weekly - 1.5 mg weekly, stopped by mistake, restart to 0.75     lantus , changed to toujeo 20     Afrezza, gave cough    rx for lyumjev  Explained 1:8 and 1:25       Dexcom , 2 week report  \type 2 diabetes w hyperglycemia     The plan             Lipids    LDL normal, prefers no statins   Tg elevated , vascepa is what we need, not covered     She was on fenofibrate, changed to gemfibrozil - stop them both   ===========    Osteoporosis      Primary hyperparathyroidism - cured by right sup parathyroidectomy in Feb 2018  Dr. Rodriguez correctly identified Left normal sup and inferior parathyroids  Right inferior never identified   She was clearly cured after surgery     Component      Latest Ref Rng & Units 1/23/2018           8:58 AM   PTH Intact (Serial Monitor)      15.0 - 65.0 pg/mL 74.8 (H)   Calcium      8.6 - 10.5 mg/dL 12.9 (H)     Component      Latest Ref Rng & Units 6/4/2018  postop           8:07 AM   PTH Intact (Serial Monitor)      15.0 - 65.0 pg/mL 8.3 (L)   Calcium      8.6 - 10.5 mg/dL 10.2     Lab Results   Component Value Date    PTH 18.2 06/08/2022    CALCIUM 10.0 06/08/2022    CALCIUM 9.9 06/08/2022    CAION 1.33 06/08/2022    PHOS 3.8  2021         Dec 2020     ruled out celiac dx       Ruled out Cushing's by LDDST    Component      Latest Ref Rng & Units 2020   ACTH      7.2 - 63.3 pg/mL 30.7  2.7 (L)   Cortisol - AM      mcg/dL 13.89  1.43       Hypercalciuria remained despite correction of Primary Hyperparathyroidism I start HCTZ and now 24 h urine calcium less than 300         2000 units of vitamin D and calcium 400 to 600 mg bid or dietary calcium       on prolia since  but due to loss I changed to evenity      DXA 2020  Stable at spine but declining at Femoral Neck , markers positioned correctly                 ----     completed evenity for 1 year, completion date Dec 2021  Gain of 3 % at both Femoral Necks by my personal review    Now on prolia   --    Diffuse goiter, nl TSH    No need for repeat US , just follow TSH     Lab Results   Component Value Date    TSH 1.470 2022    TSH 1.870 2021    TSH 1.780 2020       Thyroid Antibodies  TPO AB  Lab Results   Component Value Date    THYROIDAB 7 2018       ==    Anemia    Will have colonoscopy     ==    Low Magnesium     Start Mg Oxide 200 mg daily - doing OTC    Now nl magnesium     ==    Low b12 on IM injection - [facility]     Needs to take  Sublingual        A copy of my note was sent to Mariam Morgan MD    Please see my above opinion and suggestions.      Patient Demographics    Patient Name   Rekha Dorado Legal Sex   Female    1955 [facility]    Address   [address] [place] [address] Phone   [phone] (Home)   [phone] (Mobile)   Emergency Contact(s)    Name Relation Home Work Mobile   Dioni Brantley [phone]     PCP and Center    Primary Care Provider Phone Center   Mariam Morgan [phone] [facility]   Patient Employment    Status   Retired   Active Insurance as of 2021    [facility] MEDICARE REPLACEMENT - [facility]  MEDICARE REPLACEMENT    Payor Plan Insurance Group Employer/Plan Group   [facility] MEDICARE REPLACEMENT [facility] MEDICARE REPLACEMENT [id number]    Payor Plan Address Payor Plan Phone Number Payor Plan Fax Number Effective Dates   [address] [address]   1/1/2018 - None Entered   [facility] [id number]      Subscriber Name Subscriber Birth Date Member ID    JACE NEGRETE [id number]    Current Medications    aspirin 81 MG EC tablet   budesonide-formoterol (SYMBICORT) 160-4.5 MCG/ACT inhaler   carvedilol (COREG) 3.125 MG tablet   Cholecalciferol (VITAMIN D) 2000 units tablet   choline fenofibrate (TRILIPIX) 135 MG capsule   CRANBERRY EXTRACT PO   Cyanocobalamin (B-12) 500 MCG sublingual tablet   dicyclomine (BENTYL) 10 MG capsule   Dulaglutide 0.75 MG/0.5ML solution pen-injector   fexofenadine (ALLEGRA) 60 MG tablet   glucosamine sulfate 500 MG capsule capsule   hydroCHLOROthiazide (HYDRODIURIL) 12.5 MG tablet   KRILL OIL PO   losartan (COZAAR) 50 MG tablet   metFORMIN ER (Glucophage XR) 500 MG 24 hr tablet   montelukast (SINGULAIR) 10 MG tablet   olopatadine (PATADAY) 0.2 % solution ophthalmic solution   omeprazole (priLOSEC) 20 MG capsule   ondansetron (Zofran) 4 MG tablet   Insulin Glargine (Lantus SoloStar) 100 UNIT/ML injection pen   Insulin Pen Needle (Pen Needles) 32G X 4 MM misc   Insulin Regular Human 4 & 8 & 12 units powder   Insulin Regular Human (Afrezza) 4 units powder (Discontinued)   predniSONE (DELTASONE) 10 MG (21) dose pack (Discontinued)   Allergies    No Known Allergies  Future Appointments     Provider Department Center   8/31/2021 3:30 PM [facility] OP INFU CHAIR [facility] OP INFUSION MAD   12/20/2021 9:45 AM Maxi Rios MD [facility] ENDOCRINOLOGY [facility]           This document has been electronically signed by Maxi Kraus MD on November 14, 2022 10:26 CST

## 2022-06-20 ENCOUNTER — SPECIALTY PHARMACY (OUTPATIENT)
Dept: ENDOCRINOLOGY | Facility: CLINIC | Age: 67
End: 2022-06-20

## 2022-06-28 ENCOUNTER — LAB (OUTPATIENT)
Dept: LAB | Facility: HOSPITAL | Age: 67
End: 2022-06-28

## 2022-06-28 DIAGNOSIS — M80.00XD OSTEOPOROSIS WITH PATHOLOGICAL FRACTURE WITH ROUTINE HEALING, SUBSEQUENT ENCOUNTER: ICD-10-CM

## 2022-06-28 LAB
ALBUMIN SERPL-MCNC: 3.7 G/DL (ref 3.5–5.2)
ALBUMIN/GLOB SERPL: 1.1 G/DL
ALP SERPL-CCNC: 114 U/L (ref 39–117)
ALT SERPL W P-5'-P-CCNC: 34 U/L (ref 1–33)
ANION GAP SERPL CALCULATED.3IONS-SCNC: 11 MMOL/L (ref 5–15)
AST SERPL-CCNC: 28 U/L (ref 1–32)
BILIRUB SERPL-MCNC: 0.3 MG/DL (ref 0–1.2)
BUN SERPL-MCNC: 14 MG/DL (ref 8–23)
BUN/CREAT SERPL: 20.3 (ref 7–25)
CALCIUM SPEC-SCNC: 9.8 MG/DL (ref 8.6–10.5)
CHLORIDE SERPL-SCNC: 99 MMOL/L (ref 98–107)
CO2 SERPL-SCNC: 26 MMOL/L (ref 22–29)
CREAT SERPL-MCNC: 0.69 MG/DL (ref 0.57–1)
EGFRCR SERPLBLD CKD-EPI 2021: 94.1 ML/MIN/1.73
GLOBULIN UR ELPH-MCNC: 3.4 GM/DL
GLUCOSE SERPL-MCNC: 199 MG/DL (ref 65–99)
MAGNESIUM SERPL-MCNC: 1.5 MG/DL (ref 1.6–2.4)
PHOSPHATE SERPL-MCNC: 3.5 MG/DL (ref 2.5–4.5)
POTASSIUM SERPL-SCNC: 3.8 MMOL/L (ref 3.5–5.2)
PROT SERPL-MCNC: 7.1 G/DL (ref 6–8.5)
SODIUM SERPL-SCNC: 136 MMOL/L (ref 136–145)

## 2022-06-28 PROCEDURE — 80053 COMPREHEN METABOLIC PANEL: CPT

## 2022-06-28 PROCEDURE — 83735 ASSAY OF MAGNESIUM: CPT

## 2022-06-28 PROCEDURE — 84100 ASSAY OF PHOSPHORUS: CPT

## 2022-06-28 RX ORDER — INSULIN LISPRO-AABC 100 [IU]/ML
15 INJECTION, SOLUTION SUBCUTANEOUS
Qty: 15 ML | Refills: 11 | Status: SHIPPED | OUTPATIENT
Start: 2022-06-28

## 2022-06-28 NOTE — TELEPHONE ENCOUNTER
Incoming Refill Request      Medication requested (name and dose): camilo kwikpen 100 unit/ml pen injector     Pharmacy where request should be sent: [facility]     Additional details provided by patient:     Best call back number: [phone]    Does the patient have less than a 3 day supply:  [x] Yes  [] No    Jabari Prabhakar Rep  06/28/22, 08:30 CDT

## 2022-07-05 ENCOUNTER — INFUSION (OUTPATIENT)
Dept: ONCOLOGY | Facility: HOSPITAL | Age: 67
End: 2022-07-05

## 2022-07-05 ENCOUNTER — TREATMENT (OUTPATIENT)
Dept: ENDOCRINOLOGY | Facility: CLINIC | Age: 67
End: 2022-07-05

## 2022-07-05 VITALS — DIASTOLIC BLOOD PRESSURE: 57 MMHG | SYSTOLIC BLOOD PRESSURE: 119 MMHG | HEART RATE: 78 BPM | RESPIRATION RATE: 18 BRPM

## 2022-07-05 DIAGNOSIS — M80.00XD OSTEOPOROSIS WITH PATHOLOGICAL FRACTURE WITH ROUTINE HEALING, SUBSEQUENT ENCOUNTER: Primary | ICD-10-CM

## 2022-07-05 PROCEDURE — 96372 THER/PROPH/DIAG INJ SC/IM: CPT | Performed by: INTERNAL MEDICINE

## 2022-07-05 PROCEDURE — 25010000002 DENOSUMAB 60 MG/ML SOLUTION PREFILLED SYRINGE: Performed by: INTERNAL MEDICINE

## 2022-07-05 RX ADMIN — DENOSUMAB 60 MG: 60 INJECTION SUBCUTANEOUS at 14:31

## 2022-08-01 RX ORDER — PEN NEEDLE, DIABETIC 30 GX3/16"
1 NEEDLE, DISPOSABLE MISCELLANEOUS DAILY
Qty: 90 EACH | Refills: 11 | Status: SHIPPED | OUTPATIENT
Start: 2022-08-01

## 2022-08-16 ENCOUNTER — SPECIALTY PHARMACY (OUTPATIENT)
Dept: ENDOCRINOLOGY | Facility: CLINIC | Age: 67
End: 2022-08-16

## 2022-08-16 RX ORDER — INSULIN GLARGINE 300 U/ML
20 INJECTION, SOLUTION SUBCUTANEOUS
Qty: 6 ML | Refills: 3 | Status: SHIPPED | OUTPATIENT
Start: 2022-08-16

## 2022-10-10 ENCOUNTER — TELEPHONE (OUTPATIENT)
Dept: ENDOCRINOLOGY | Facility: CLINIC | Age: 67
End: 2022-10-10

## 2022-10-10 DIAGNOSIS — M81.0 AGE-RELATED OSTEOPOROSIS WITHOUT CURRENT PATHOLOGICAL FRACTURE: ICD-10-CM

## 2022-10-10 DIAGNOSIS — M80.00XD OSTEOPOROSIS WITH PATHOLOGICAL FRACTURE WITH ROUTINE HEALING, SUBSEQUENT ENCOUNTER: Primary | ICD-10-CM

## 2022-10-10 DIAGNOSIS — M80.08XA AGE-RELATED OSTEOPOROSIS WITH CURRENT PATHOLOGICAL FRACTURE, VERTEBRA(E), INITIAL ENCOUNTER FOR FRACTURE: ICD-10-CM

## 2022-10-10 NOTE — TELEPHONE ENCOUNTER
Spoke with pt to reschedule her appt in Dec per Dr Cook. Pt is not coming in January, but states that Dr Cook is supposed to put in orders for her to have a Dexa scan.    Thanks

## 2022-11-14 ENCOUNTER — DOCUMENTATION (OUTPATIENT)
Dept: ENDOCRINOLOGY | Facility: CLINIC | Age: 67
End: 2022-11-14

## 2022-11-29 ENCOUNTER — TELEPHONE (OUTPATIENT)
Dept: ENDOCRINOLOGY | Facility: CLINIC | Age: 67
End: 2022-11-29

## 2022-11-29 NOTE — TELEPHONE ENCOUNTER
Pt called wanting to know if Dexcom can be worn places other than the abdomen. Informed her while it is FDA approved for abdomen only there are people that wear it on the back of the arm or top of leg.

## 2022-12-28 ENCOUNTER — LAB (OUTPATIENT)
Dept: LAB | Facility: HOSPITAL | Age: 67
End: 2022-12-28
Payer: MEDICARE

## 2022-12-28 DIAGNOSIS — M80.00XD OSTEOPOROSIS WITH PATHOLOGICAL FRACTURE WITH ROUTINE HEALING, SUBSEQUENT ENCOUNTER: ICD-10-CM

## 2022-12-28 LAB
25(OH)D3 SERPL-MCNC: 78.2 NG/ML (ref 30–100)
ALBUMIN SERPL-MCNC: 4.3 G/DL (ref 3.5–5.2)
ALBUMIN UR-MCNC: <1.2 MG/DL
ALBUMIN/GLOB SERPL: 1.3 G/DL
ALP SERPL-CCNC: 111 U/L (ref 39–117)
ALT SERPL W P-5'-P-CCNC: 27 U/L (ref 1–33)
ANION GAP SERPL CALCULATED.3IONS-SCNC: 10 MMOL/L (ref 5–15)
AST SERPL-CCNC: 30 U/L (ref 1–32)
BASOPHILS # BLD AUTO: 0.07 10*3/MM3 (ref 0–0.2)
BASOPHILS NFR BLD AUTO: 0.9 % (ref 0–1.5)
BILIRUB SERPL-MCNC: 0.4 MG/DL (ref 0–1.2)
BUN SERPL-MCNC: 14 MG/DL (ref 8–23)
BUN/CREAT SERPL: 18.2 (ref 7–25)
CALCIUM SPEC-SCNC: 9.8 MG/DL (ref 8.6–10.5)
CHLORIDE SERPL-SCNC: 99 MMOL/L (ref 98–107)
CHOLEST SERPL-MCNC: 175 MG/DL (ref 0–200)
CO2 SERPL-SCNC: 28 MMOL/L (ref 22–29)
CREAT SERPL-MCNC: 0.77 MG/DL (ref 0.57–1)
CREAT UR-MCNC: 23.2 MG/DL
DEPRECATED RDW RBC AUTO: 45.6 FL (ref 37–54)
EGFRCR SERPLBLD CKD-EPI 2021: 83.6 ML/MIN/1.73
EOSINOPHIL # BLD AUTO: 0.27 10*3/MM3 (ref 0–0.4)
EOSINOPHIL NFR BLD AUTO: 3.7 % (ref 0.3–6.2)
ERYTHROCYTE [DISTWIDTH] IN BLOOD BY AUTOMATED COUNT: 14 % (ref 12.3–15.4)
GLOBULIN UR ELPH-MCNC: 3.2 GM/DL
GLUCOSE SERPL-MCNC: 136 MG/DL (ref 65–99)
HBA1C MFR BLD: 7.8 % (ref 4.8–5.6)
HCT VFR BLD AUTO: 40 % (ref 34–46.6)
HDLC SERPL-MCNC: 36 MG/DL (ref 40–60)
HGB BLD-MCNC: 12.7 G/DL (ref 12–15.9)
IMM GRANULOCYTES # BLD AUTO: 0.03 10*3/MM3 (ref 0–0.05)
IMM GRANULOCYTES NFR BLD AUTO: 0.4 % (ref 0–0.5)
LDLC SERPL CALC-MCNC: 105 MG/DL (ref 0–100)
LDLC/HDLC SERPL: 2.76 {RATIO}
LYMPHOCYTES # BLD AUTO: 1.8 10*3/MM3 (ref 0.7–3.1)
LYMPHOCYTES NFR BLD AUTO: 24.4 % (ref 19.6–45.3)
MAGNESIUM SERPL-MCNC: 1.6 MG/DL (ref 1.6–2.4)
MCH RBC QN AUTO: 28.4 PG (ref 26.6–33)
MCHC RBC AUTO-ENTMCNC: 31.8 G/DL (ref 31.5–35.7)
MCV RBC AUTO: 89.5 FL (ref 79–97)
MICROALBUMIN/CREAT UR: NORMAL MG/G{CREAT}
MONOCYTES # BLD AUTO: 0.44 10*3/MM3 (ref 0.1–0.9)
MONOCYTES NFR BLD AUTO: 6 % (ref 5–12)
NEUTROPHILS NFR BLD AUTO: 4.78 10*3/MM3 (ref 1.7–7)
NEUTROPHILS NFR BLD AUTO: 64.6 % (ref 42.7–76)
NRBC BLD AUTO-RTO: 0 /100 WBC (ref 0–0.2)
PHOSPHATE SERPL-MCNC: 3.6 MG/DL (ref 2.5–4.5)
PLATELET # BLD AUTO: 292 10*3/MM3 (ref 140–450)
PMV BLD AUTO: 10.8 FL (ref 6–12)
POTASSIUM SERPL-SCNC: 3.7 MMOL/L (ref 3.5–5.2)
PROT SERPL-MCNC: 7.5 G/DL (ref 6–8.5)
RBC # BLD AUTO: 4.47 10*6/MM3 (ref 3.77–5.28)
SODIUM SERPL-SCNC: 137 MMOL/L (ref 136–145)
TRIGL SERPL-MCNC: 198 MG/DL (ref 0–150)
TSH SERPL DL<=0.05 MIU/L-ACNC: 2.2 UIU/ML (ref 0.27–4.2)
VIT B12 BLD-MCNC: 1091 PG/ML (ref 211–946)
VLDLC SERPL-MCNC: 34 MG/DL (ref 5–40)
WBC NRBC COR # BLD: 7.39 10*3/MM3 (ref 3.4–10.8)

## 2022-12-28 PROCEDURE — 83036 HEMOGLOBIN GLYCOSYLATED A1C: CPT | Performed by: INTERNAL MEDICINE

## 2022-12-28 PROCEDURE — 36415 COLL VENOUS BLD VENIPUNCTURE: CPT | Performed by: INTERNAL MEDICINE

## 2022-12-28 PROCEDURE — 80061 LIPID PANEL: CPT | Performed by: INTERNAL MEDICINE

## 2022-12-28 PROCEDURE — 82306 VITAMIN D 25 HYDROXY: CPT | Performed by: INTERNAL MEDICINE

## 2022-12-28 PROCEDURE — 84443 ASSAY THYROID STIM HORMONE: CPT | Performed by: INTERNAL MEDICINE

## 2022-12-28 PROCEDURE — 82607 VITAMIN B-12: CPT | Performed by: INTERNAL MEDICINE

## 2022-12-28 PROCEDURE — 84100 ASSAY OF PHOSPHORUS: CPT

## 2022-12-28 PROCEDURE — 82570 ASSAY OF URINE CREATININE: CPT | Performed by: INTERNAL MEDICINE

## 2022-12-28 PROCEDURE — 85025 COMPLETE CBC W/AUTO DIFF WBC: CPT | Performed by: INTERNAL MEDICINE

## 2022-12-28 PROCEDURE — 82043 UR ALBUMIN QUANTITATIVE: CPT | Performed by: INTERNAL MEDICINE

## 2022-12-28 PROCEDURE — 83735 ASSAY OF MAGNESIUM: CPT

## 2022-12-28 PROCEDURE — 80053 COMPREHEN METABOLIC PANEL: CPT | Performed by: INTERNAL MEDICINE

## 2023-01-03 ENCOUNTER — OFFICE VISIT (OUTPATIENT)
Dept: ENDOCRINOLOGY | Facility: CLINIC | Age: 68
End: 2023-01-03
Payer: MEDICARE

## 2023-01-03 VITALS
WEIGHT: 216 LBS | SYSTOLIC BLOOD PRESSURE: 110 MMHG | HEART RATE: 74 BPM | BODY MASS INDEX: 30.92 KG/M2 | HEIGHT: 70 IN | OXYGEN SATURATION: 97 % | DIASTOLIC BLOOD PRESSURE: 60 MMHG

## 2023-01-03 DIAGNOSIS — E11.65 TYPE 2 DIABETES MELLITUS WITH HYPERGLYCEMIA, WITHOUT LONG-TERM CURRENT USE OF INSULIN: Primary | ICD-10-CM

## 2023-01-03 DIAGNOSIS — E55.9 VITAMIN D DEFICIENCY: ICD-10-CM

## 2023-01-03 DIAGNOSIS — E53.8 B12 DEFICIENCY: ICD-10-CM

## 2023-01-03 DIAGNOSIS — M80.00XS AGE-RELATED OSTEOPOROSIS WITH CURRENT PATHOLOGICAL FRACTURE, SEQUELA: ICD-10-CM

## 2023-01-03 DIAGNOSIS — R82.994 HYPERCALCIURIA: ICD-10-CM

## 2023-01-03 PROCEDURE — 99214 OFFICE O/P EST MOD 30 MIN: CPT | Performed by: INTERNAL MEDICINE

## 2023-01-03 PROCEDURE — 95251 CONT GLUC MNTR ANALYSIS I&R: CPT | Performed by: INTERNAL MEDICINE

## 2023-01-03 PROCEDURE — 1160F RVW MEDS BY RX/DR IN RCRD: CPT | Performed by: INTERNAL MEDICINE

## 2023-01-03 PROCEDURE — 1159F MED LIST DOCD IN RCRD: CPT | Performed by: INTERNAL MEDICINE

## 2023-01-03 RX ORDER — CYCLOBENZAPRINE HCL 10 MG
10 TABLET ORAL 2 TIMES DAILY PRN
COMMUNITY

## 2023-01-03 RX ORDER — AMOXICILLIN AND CLAVULANATE POTASSIUM 875; 125 MG/1; MG/1
1 TABLET, FILM COATED ORAL 2 TIMES DAILY
COMMUNITY

## 2023-01-03 RX ORDER — FUROSEMIDE 20 MG/1
20 TABLET ORAL AS NEEDED
COMMUNITY

## 2023-01-03 RX ORDER — METRONIDAZOLE 7.5 MG/G
GEL TOPICAL 2 TIMES DAILY
COMMUNITY

## 2023-01-03 RX ORDER — AZELASTINE 1 MG/ML
2 SPRAY, METERED NASAL 2 TIMES DAILY
COMMUNITY

## 2023-01-03 RX ORDER — POTASSIUM CHLORIDE 600 MG/1
8 CAPSULE, EXTENDED RELEASE ORAL DAILY
COMMUNITY

## 2023-01-03 NOTE — PROGRESS NOTES
Rekha Dorado is a 67 y.o. female who presents for  evaluation of   Type 2 diabetes and osteoporosis    HPI       Very pleasant 67-year-old female sp right superior parathyroidectomy in April 2018 for primary hyperparathyroidism  Pathology confirmed that left parathyroids were identified, biopsied and left intact  The right inf was not found    Intraoperative PTH decreased from 74 to 8 intraoperatively     Complications, she has severe osteoporosis complicated by  wrist fracture.    After parathyroidectomy we started on prolia and bone density   decreased at the femoral neck despite treatment w prolia, calcium and vit D     Now on Evenity , started Dec 2020         Lab Results   Component Value Date    PTH 18.2 06/08/2022    CALCIUM 9.8 12/28/2022    CAION 1.33 06/08/2022    PHOS 3.6 12/28/2022            ----------    She also has type 2 diabetes w loss of control        Review of Systems   Cardiovascular: Positive for leg swelling.      Feels well , no concerns     Objective:   /60   Pulse 74   Ht 177.8 cm (70\")   Wt 98 kg (216 lb)   SpO2 97%   BMI 30.99 kg/m²     Physical Exam   HENT:   Head: Normocephalic.   Pulmonary/Chest: Effort normal and breath sounds normal.   Abdominal: Normal appearance.   Musculoskeletal:      Right lower leg: No edema.      Left lower leg: No edema.   Neurological: She is alert.       Lab Review    Lab Results   Component Value Date    WBC 7.39 12/28/2022    HGB 12.7 12/28/2022    HCT 40.0 12/28/2022    MCV 89.5 12/28/2022     12/28/2022     Lab Results   Component Value Date    GLUCOSE 136 (H) 12/28/2022    BUN 14 12/28/2022    CREATININE 0.77 12/28/2022    EGFRIFNONA 56 (L) 12/28/2021    BCR 18.2 12/28/2022    K 3.7 12/28/2022    CO2 28.0 12/28/2022    CALCIUM 9.8 12/28/2022    ALBUMIN 4.3 12/28/2022    AST 30 12/28/2022    ALT 27 12/28/2022             Assessment & Plan       ICD-10-CM ICD-9-CM   1. Type 2 diabetes mellitus with hyperglycemia,  without long-term current use of insulin (HCC)  E11.65 250.00     790.29   2. Vitamin D deficiency  E55.9 268.9   3. B12 deficiency  E53.8 266.2   4. Age-related osteoporosis with current pathological fracture, sequela  M80.00XS 905.5     733.01   5. Hypercalciuria  R82.994 275.40           Type 2 diabetes             Lab Results   Component Value Date    HGBA1C 7.80 (H) 12/28/2022    HGBA1C 8.90 (H) 06/08/2022    HGBA1C 8.04 (H) 12/14/2021     Lab Results   Component Value Date    MICROALBUR <1.2 12/28/2022    CREATININE 0.77 12/28/2022         metformin 500 mg at night as much as she can take   trulicity 0.75 mg weekly - 1.5 mg weekly, stopped by mistake, restart to 0.75   Increase to 1.5 or change to mounjaro    lantus , changed to toujeo 20     Afrezza, gave cough    rx for lyumjev      Doing 3:15 and 1:15 correction above 150       Dexcom , 2 week report  \type 2 diabetes w hyperglycemia          Eye exam 2022 ,nl  Nl renal   Lab Results   Component Value Date    MALBCRERATIO  12/28/2022      Comment:      Unable to calculate    MALBCRERATIO  06/08/2022      Comment:      Unable to calculate     Lab Results   Component Value Date    EGFR 83.6 12/28/2022    EGFR 94.1 06/28/2022         ===================      Lipids  Lab Results   Component Value Date    CHOL 175 12/28/2022    TRIG 198 (H) 12/28/2022    HDL 36 (L) 12/28/2022     (H) 12/28/2022       LDL normal, prefers no statins   Tg elevated , vascepa is what we need, not covered     She was on fenofibrate, changed to gemfibrozil - stop them both   ===========    Osteoporosis      Primary hyperparathyroidism - cured by right sup parathyroidectomy in Feb 2018  Dr. Rodriguez correctly identified Left normal sup and inferior parathyroids  Right inferior never identified   She was clearly cured after surgery     Component      Latest Ref Rng & Units 1/23/2018           8:58 AM   PTH Intact (Serial Monitor)      15.0 - 65.0 pg/mL 74.8 (H)   Calcium      8.6 -  10.5 mg/dL 12.9 (H)     Component      Latest Ref Rng & Units 2018  postop           8:07 AM   PTH Intact (Serial Monitor)      15.0 - 65.0 pg/mL 8.3 (L)   Calcium      8.6 - 10.5 mg/dL 10.2     Lab Results   Component Value Date    PTH 18.2 2022    CALCIUM 9.8 2022    CAION 1.33 2022    PHOS 3.6 2022         Dec 2020     ruled out celiac dx       Ruled out Cushing's by LDDST    Component      Latest Ref Rng & Units 2020   ACTH      7.2 - 63.3 pg/mL 30.7  2.7 (L)   Cortisol - AM      mcg/dL 13.89  1.43       Hypercalciuria remained despite correction of Primary Hyperparathyroidism I start HCTZ and now 24 h urine calcium less than 300         2000 units of vitamin D and calcium 400 to 600 mg bid or dietary calcium       on prolia since  but due to loss I changed to evenity      DXA 2020  Stable at spine but declining at Femoral Neck , markers positioned correctly                 ----     completed evenity for 1 year, completion date Dec 2021  Gain of 3 % at both Femoral Necks by my personal review    Now on prolia   --    Diffuse goiter, nl TSH    No need for repeat US , just follow TSH     Lab Results   Component Value Date    TSH 2.200 2022    TSH 1.470 2022    TSH 1.870 2021       Thyroid Antibodies  TPO AB  Lab Results   Component Value Date    THYROIDAB 7 2018       ==    Anemia    Will have colonoscopy     ==    Low Magnesium     Start Mg Oxide 200 mg daily - doing OTC    Now nl magnesium     ==    Low b12 on IM injection - [facility]     Needs to take  Sublingual        A copy of my note was sent to Mariam Morgan MD    Please see my above opinion and suggestions.      Patient Demographics    Patient Name   Rekha Dorado Legal Sex   Female    1955 SSN    Address   [address] [place] [address] Phone   [phone] (Home)   [phone] (Mobile)   Emergency  Contact(s)    Name Relation Home Work Mobile   Dioni Aguilar [phone]     PCP and Center    Primary Care Provider Phone Center   Mariam Morgan [phone] [facility]   Patient Employment    Status   Retired   Active Insurance as of 8/19/2021    [employer] MEDICARE REPLACEMENT - [employer] MEDICARE REPLACEMENT    Payor Plan Insurance Group Employer/Plan Group   [employer] MEDICARE REPLACEMENT [employer] MEDICARE REPLACEMENT [id number]    Payor Plan Address Payor Plan Phone Number Payor Plan Fax Number Effective Dates   [address] [address]   1/1/2018 - None Entered   [facility] [id number]      Subscriber Name Subscriber Birth Date Member ID    JACE NEGRETEHIP [id number]    Current Medications    aspirin 81 MG EC tablet   budesonide-formoterol (SYMBICORT) 160-4.5 MCG/ACT inhaler   carvedilol (COREG) 3.125 MG tablet   Cholecalciferol (VITAMIN D) 2000 units tablet   choline fenofibrate (TRILIPIX) 135 MG capsule   CRANBERRY EXTRACT PO   Cyanocobalamin (B-12) 500 MCG sublingual tablet   dicyclomine (BENTYL) 10 MG capsule   Dulaglutide 0.75 MG/0.5ML solution pen-injector   fexofenadine (ALLEGRA) 60 MG tablet   glucosamine sulfate 500 MG capsule capsule   hydroCHLOROthiazide (HYDRODIURIL) 12.5 MG tablet   KRILL OIL PO   losartan (COZAAR) 50 MG tablet   metFORMIN ER (Glucophage XR) 500 MG 24 hr tablet   montelukast (SINGULAIR) 10 MG tablet   olopatadine (PATADAY) 0.2 % solution ophthalmic solution   omeprazole (priLOSEC) 20 MG capsule   ondansetron (Zofran) 4 MG tablet   Insulin Glargine (Lantus SoloStar) 100 UNIT/ML injection pen   Insulin Pen Needle (Pen Needles) 32G X 4 MM misc   Insulin Regular Human 4 & 8 & 12 units powder   Insulin Regular Human (Afrezza) 4 units powder (Discontinued)   predniSONE (DELTASONE) 10 MG (21) dose pack (Discontinued)   Allergies    No Known Allergies  Future Appointments     Provider Department Center   8/31/2021 3:30 PM  [facility]  OP INFU CHAIR 15 [facility] OP INFUSION [facility]   12/20/2021 9:45 AM Maxi Rios MD [facility]           This document has been electronically signed by Maxi Kraus MD on January 3, 2023 11:58 CST

## 2023-01-09 ENCOUNTER — APPOINTMENT (OUTPATIENT)
Dept: ONCOLOGY | Facility: HOSPITAL | Age: 68
End: 2023-01-09

## 2023-01-10 ENCOUNTER — INFUSION (OUTPATIENT)
Dept: ONCOLOGY | Facility: HOSPITAL | Age: 68
End: 2023-01-10
Payer: MEDICARE

## 2023-01-10 VITALS — SYSTOLIC BLOOD PRESSURE: 136 MMHG | DIASTOLIC BLOOD PRESSURE: 64 MMHG | TEMPERATURE: 97.1 F | HEART RATE: 83 BPM

## 2023-01-10 DIAGNOSIS — M80.00XD OSTEOPOROSIS WITH PATHOLOGICAL FRACTURE WITH ROUTINE HEALING, SUBSEQUENT ENCOUNTER: Primary | ICD-10-CM

## 2023-01-10 PROCEDURE — 96372 THER/PROPH/DIAG INJ SC/IM: CPT | Performed by: NURSE PRACTITIONER

## 2023-01-10 PROCEDURE — 25010000002 DENOSUMAB 60 MG/ML SOLUTION PREFILLED SYRINGE: Performed by: INTERNAL MEDICINE

## 2023-01-10 RX ADMIN — DENOSUMAB 60 MG: 60 INJECTION SUBCUTANEOUS at 13:47

## 2023-05-24 ENCOUNTER — TELEPHONE (OUTPATIENT)
Dept: ENDOCRINOLOGY | Facility: CLINIC | Age: 68
End: 2023-05-24
Payer: MEDICARE

## 2023-05-24 NOTE — TELEPHONE ENCOUNTER
Rep from [facility] called and left a voicemail wanting to confirm that the pt was seen on Jan 19 of this year and that the pt is scheduled for a follow up in July. She is also asking if an A1C was drawn on the pt. She would like a call back at [phone].    Please advise.    Thanks

## 2023-06-06 RX ORDER — PEN NEEDLE, DIABETIC 32GX 5/32"
NEEDLE, DISPOSABLE MISCELLANEOUS
Qty: 360 EACH | Refills: 3 | Status: SHIPPED | OUTPATIENT
Start: 2023-06-06

## 2023-06-30 LAB
ALBUMIN SERPL-MCNC: 3.7 G/DL (ref 3.5–5.2)
ALBUMIN/GLOB SERPL: 1.3 G/DL
ALP SERPL-CCNC: 128 U/L (ref 39–117)
ALT SERPL W P-5'-P-CCNC: 45 U/L (ref 1–33)
ANION GAP SERPL CALCULATED.3IONS-SCNC: 13 MMOL/L (ref 5–15)
AST SERPL-CCNC: 24 U/L (ref 1–32)
BASOPHILS # BLD AUTO: 0.08 10*3/MM3 (ref 0–0.2)
BASOPHILS NFR BLD AUTO: 1.3 % (ref 0–1.5)
BILIRUB SERPL-MCNC: 0.4 MG/DL (ref 0–1.2)
BUN SERPL-MCNC: 14 MG/DL (ref 8–23)
BUN/CREAT SERPL: 17.3 (ref 7–25)
CALCIUM SPEC-SCNC: 10.3 MG/DL (ref 8.6–10.5)
CHLORIDE SERPL-SCNC: 98 MMOL/L (ref 98–107)
CHOLEST SERPL-MCNC: 189 MG/DL (ref 0–200)
CO2 SERPL-SCNC: 24 MMOL/L (ref 22–29)
CREAT SERPL-MCNC: 0.81 MG/DL (ref 0.57–1)
DEPRECATED RDW RBC AUTO: 43.2 FL (ref 37–54)
EGFRCR SERPLBLD CKD-EPI 2021: 78.2 ML/MIN/1.73
EOSINOPHIL # BLD AUTO: 0.31 10*3/MM3 (ref 0–0.4)
EOSINOPHIL NFR BLD AUTO: 5 % (ref 0.3–6.2)
ERYTHROCYTE [DISTWIDTH] IN BLOOD BY AUTOMATED COUNT: 13.2 % (ref 12.3–15.4)
GLOBULIN UR ELPH-MCNC: 2.9 GM/DL
GLUCOSE SERPL-MCNC: 200 MG/DL (ref 65–99)
HBA1C MFR BLD: 8 % (ref 4.8–5.6)
HCT VFR BLD AUTO: 36.8 % (ref 34–46.6)
HDLC SERPL-MCNC: 34 MG/DL (ref 40–60)
HGB BLD-MCNC: 12 G/DL (ref 12–15.9)
IMM GRANULOCYTES # BLD AUTO: 0.02 10*3/MM3 (ref 0–0.05)
IMM GRANULOCYTES NFR BLD AUTO: 0.3 % (ref 0–0.5)
LDLC SERPL CALC-MCNC: 111 MG/DL (ref 0–100)
LDLC/HDLC SERPL: 3.07 {RATIO}
LYMPHOCYTES # BLD AUTO: 1.79 10*3/MM3 (ref 0.7–3.1)
LYMPHOCYTES NFR BLD AUTO: 29 % (ref 19.6–45.3)
MCH RBC QN AUTO: 29.1 PG (ref 26.6–33)
MCHC RBC AUTO-ENTMCNC: 32.6 G/DL (ref 31.5–35.7)
MCV RBC AUTO: 89.3 FL (ref 79–97)
MONOCYTES # BLD AUTO: 0.37 10*3/MM3 (ref 0.1–0.9)
MONOCYTES NFR BLD AUTO: 6 % (ref 5–12)
NEUTROPHILS NFR BLD AUTO: 3.6 10*3/MM3 (ref 1.7–7)
NEUTROPHILS NFR BLD AUTO: 58.4 % (ref 42.7–76)
NRBC BLD AUTO-RTO: 0 /100 WBC (ref 0–0.2)
PHOSPHATE SERPL-MCNC: 4 MG/DL (ref 2.5–4.5)
PLATELET # BLD AUTO: 174 10*3/MM3 (ref 140–450)
PMV BLD AUTO: 11.1 FL (ref 6–12)
POTASSIUM SERPL-SCNC: 4 MMOL/L (ref 3.5–5.2)
PROT SERPL-MCNC: 6.6 G/DL (ref 6–8.5)
RBC # BLD AUTO: 4.12 10*6/MM3 (ref 3.77–5.28)
SODIUM SERPL-SCNC: 135 MMOL/L (ref 136–145)
TRIGL SERPL-MCNC: 253 MG/DL (ref 0–150)
TSH SERPL DL<=0.05 MIU/L-ACNC: 2.23 UIU/ML (ref 0.27–4.2)
VLDLC SERPL-MCNC: 44 MG/DL (ref 5–40)
WBC NRBC COR # BLD: 6.17 10*3/MM3 (ref 3.4–10.8)

## 2023-07-01 LAB
ALBUMIN UR-MCNC: <1.2 MG/DL
CREAT UR-MCNC: 66.6 MG/DL
MICROALBUMIN/CREAT UR: NORMAL MG/G{CREAT}
VIT B12 BLD-MCNC: 1064 PG/ML (ref 211–946)

## 2023-07-24 ENCOUNTER — TELEPHONE (OUTPATIENT)
Dept: ONCOLOGY | Facility: HOSPITAL | Age: 68
End: 2023-07-24
Payer: MEDICARE

## 2023-07-24 NOTE — TELEPHONE ENCOUNTER
Spoke w/ pt about the need for lab work prior to Prolia. She stated she would go to the [facility] lab. Orders are in.

## 2023-07-26 ENCOUNTER — LAB (OUTPATIENT)
Dept: LAB | Facility: HOSPITAL | Age: 68
End: 2023-07-26
Payer: MEDICARE

## 2023-07-26 DIAGNOSIS — M80.00XD OSTEOPOROSIS WITH PATHOLOGICAL FRACTURE WITH ROUTINE HEALING, SUBSEQUENT ENCOUNTER: ICD-10-CM

## 2023-07-26 LAB
25(OH)D3 SERPL-MCNC: 79.1 NG/ML (ref 30–100)
ALBUMIN SERPL-MCNC: 4 G/DL (ref 3.5–5.2)
ALBUMIN/GLOB SERPL: 1.1 G/DL
ALP SERPL-CCNC: 108 U/L (ref 39–117)
ALT SERPL W P-5'-P-CCNC: 21 U/L (ref 1–33)
ANION GAP SERPL CALCULATED.3IONS-SCNC: 14 MMOL/L (ref 5–15)
AST SERPL-CCNC: 20 U/L (ref 1–32)
BILIRUB SERPL-MCNC: 0.5 MG/DL (ref 0–1.2)
BUN SERPL-MCNC: 14 MG/DL (ref 8–23)
BUN/CREAT SERPL: 20 (ref 7–25)
CALCIUM SPEC-SCNC: 10.1 MG/DL (ref 8.6–10.5)
CHLORIDE SERPL-SCNC: 101 MMOL/L (ref 98–107)
CO2 SERPL-SCNC: 23 MMOL/L (ref 22–29)
CREAT SERPL-MCNC: 0.7 MG/DL (ref 0.57–1)
EGFRCR SERPLBLD CKD-EPI 2021: 93.2 ML/MIN/1.73
GLOBULIN UR ELPH-MCNC: 3.5 GM/DL
GLUCOSE SERPL-MCNC: 125 MG/DL (ref 65–99)
MAGNESIUM SERPL-MCNC: 1.8 MG/DL (ref 1.6–2.4)
PHOSPHATE SERPL-MCNC: 3.9 MG/DL (ref 2.5–4.5)
POTASSIUM SERPL-SCNC: 4 MMOL/L (ref 3.5–5.2)
PROT SERPL-MCNC: 7.5 G/DL (ref 6–8.5)
SODIUM SERPL-SCNC: 138 MMOL/L (ref 136–145)

## 2023-07-26 PROCEDURE — 36415 COLL VENOUS BLD VENIPUNCTURE: CPT

## 2023-07-26 PROCEDURE — 83735 ASSAY OF MAGNESIUM: CPT

## 2023-07-26 PROCEDURE — 82306 VITAMIN D 25 HYDROXY: CPT

## 2023-07-26 PROCEDURE — 84100 ASSAY OF PHOSPHORUS: CPT

## 2023-07-26 PROCEDURE — 80053 COMPREHEN METABOLIC PANEL: CPT

## 2023-07-28 ENCOUNTER — INFUSION (OUTPATIENT)
Dept: ONCOLOGY | Facility: HOSPITAL | Age: 68
End: 2023-07-28
Payer: MEDICARE

## 2023-07-28 VITALS
TEMPERATURE: 96.7 F | RESPIRATION RATE: 18 BRPM | HEART RATE: 78 BPM | SYSTOLIC BLOOD PRESSURE: 131 MMHG | DIASTOLIC BLOOD PRESSURE: 60 MMHG

## 2023-07-28 DIAGNOSIS — M80.00XD OSTEOPOROSIS WITH PATHOLOGICAL FRACTURE WITH ROUTINE HEALING, SUBSEQUENT ENCOUNTER: Primary | ICD-10-CM

## 2023-07-28 PROCEDURE — 25010000002 DENOSUMAB 60 MG/ML SOLUTION PREFILLED SYRINGE: Performed by: INTERNAL MEDICINE

## 2023-07-28 RX ADMIN — DENOSUMAB 60 MG: 60 INJECTION SUBCUTANEOUS at 11:04

## (undated) DEVICE — GLV SURG SENSICARE GREEN W/ALOE PF LF 6 STRL

## (undated) DEVICE — DRP SLUSH MACH OM-ORS-320

## (undated) DEVICE — INTENDED TO BE USED TO OCCLUDE, RETRACT AND IDENTIFY ARTERIES, VEINS, TENDONS AND NERVES IN SURGICAL PROCEDURES: Brand: STERION®  VESSEL LOOP

## (undated) DEVICE — SUT VICRYL 4/0 SUTUPAK 18 J109T

## (undated) DEVICE — SUT VICRYL 3-0 SH-1 PO 18IN J772D

## (undated) DEVICE — SUT VIC 3/0 TIES 18IN J110T

## (undated) DEVICE — GLV SURG TRIUMPH LT PF LTX 7.5 STRL

## (undated) DEVICE — GAUZE,SPONGE,4"X4",16PLY,XRAY,STRL,LF: Brand: MEDLINE

## (undated) DEVICE — SHEET,DRAPE,53X77,STERILE: Brand: MEDLINE

## (undated) DEVICE — ANTIBACTERIAL UNDYED BRAIDED (POLYGLACTIN 910), SYNTHETIC ABSORBABLE SUTURE: Brand: COATED VICRYL

## (undated) DEVICE — TOTAL TRAY, 16FR 10ML SIL FOLEY, URN: Brand: MEDLINE

## (undated) DEVICE — GLV SURG SENSICARE GREEN W/ALOE PF LF 8 STRL

## (undated) DEVICE — GLV SURG TRIUMPH NATURAL W/ALOE PF LTX 6.5 STRL

## (undated) DEVICE — CONTAINER,SPECIMEN,OR STERILE,4OZ: Brand: MEDLINE

## (undated) DEVICE — Device

## (undated) DEVICE — DRSNG TELFA PAD NONADH STR 1S 3X8IN

## (undated) DEVICE — SUT SILK B SUTUPK 2/0 18IN SA65H

## (undated) DEVICE — SPNG DISSCT SECTO KTTNER PK/5

## (undated) DEVICE — SUT PROLN 4/0 RB1 D/A 36IN 8557H

## (undated) DEVICE — POSTN HD RING CUSH 9IN LF

## (undated) DEVICE — 3M™ STERI-STRIP™ REINFORCED ADHESIVE SKIN CLOSURES, R1547, 1/2 IN X 4 IN (12 MM X 100 MM), 6 STRIPS/ENVELOPE: Brand: 3M™ STERI-STRIP™

## (undated) DEVICE — SOL IRR NACL 0.9PCT BT 1000ML

## (undated) DEVICE — SUT SILK 3-0 TIES 18IN SA64H

## (undated) DEVICE — DISPOSABLE BIPOLAR CODE, 12' (3.66 M): Brand: CONMED

## (undated) DEVICE — SUT PERMAHAND SILK 3/0 SH1 18IN

## (undated) DEVICE — GLV SURG SENSICARE GREEN W/ALOE PF LF 7 STRL

## (undated) DEVICE — ADHS LIQ MASTISOL 2/3ML

## (undated) DEVICE — GLV SURG TRIUMPH PF LTX 6.5 STRL

## (undated) DEVICE — DRSNG SPNG GZ WOVN 8PLY 4X4IN 2PK LF STRL BX/50PK

## (undated) DEVICE — STPLR SKIN VISISTAT WD 35CT

## (undated) DEVICE — PK MAJ PROC LF 60

## (undated) DEVICE — SUT SILK 4/0 18IN SA63H

## (undated) DEVICE — GLV SURG TRIUMPH PF LTX 5.5 STRL